# Patient Record
Sex: FEMALE | Race: WHITE | NOT HISPANIC OR LATINO | Employment: OTHER | URBAN - METROPOLITAN AREA
[De-identification: names, ages, dates, MRNs, and addresses within clinical notes are randomized per-mention and may not be internally consistent; named-entity substitution may affect disease eponyms.]

---

## 2017-01-03 ENCOUNTER — LAB VISIT (OUTPATIENT)
Dept: LAB | Facility: HOSPITAL | Age: 57
End: 2017-01-03
Attending: INTERNAL MEDICINE
Payer: COMMERCIAL

## 2017-01-03 ENCOUNTER — OFFICE VISIT (OUTPATIENT)
Dept: INTERNAL MEDICINE | Facility: CLINIC | Age: 57
End: 2017-01-03
Payer: COMMERCIAL

## 2017-01-03 VITALS
HEIGHT: 66 IN | BODY MASS INDEX: 29.76 KG/M2 | OXYGEN SATURATION: 97 % | HEART RATE: 68 BPM | WEIGHT: 185.19 LBS | DIASTOLIC BLOOD PRESSURE: 76 MMHG | SYSTOLIC BLOOD PRESSURE: 124 MMHG

## 2017-01-03 DIAGNOSIS — Z13.6 SCREENING FOR CARDIOVASCULAR CONDITION: ICD-10-CM

## 2017-01-03 DIAGNOSIS — Z23 NEED FOR TDAP VACCINATION: ICD-10-CM

## 2017-01-03 DIAGNOSIS — G47.00 INSOMNIA, UNSPECIFIED TYPE: Primary | ICD-10-CM

## 2017-01-03 DIAGNOSIS — R73.09 ELEVATED RANDOM BLOOD GLUCOSE LEVEL: ICD-10-CM

## 2017-01-03 DIAGNOSIS — Z12.11 COLON CANCER SCREENING: ICD-10-CM

## 2017-01-03 LAB
ALBUMIN SERPL BCP-MCNC: 3.9 G/DL
ALP SERPL-CCNC: 92 U/L
ALT SERPL W/O P-5'-P-CCNC: 13 U/L
ANION GAP SERPL CALC-SCNC: 8 MMOL/L
AST SERPL-CCNC: 15 U/L
BILIRUB SERPL-MCNC: 0.3 MG/DL
BUN SERPL-MCNC: 13 MG/DL
CALCIUM SERPL-MCNC: 9.4 MG/DL
CHLORIDE SERPL-SCNC: 107 MMOL/L
CO2 SERPL-SCNC: 26 MMOL/L
CREAT SERPL-MCNC: 0.9 MG/DL
EST. GFR  (AFRICAN AMERICAN): >60 ML/MIN/1.73 M^2
EST. GFR  (NON AFRICAN AMERICAN): >60 ML/MIN/1.73 M^2
GLUCOSE SERPL-MCNC: 82 MG/DL
HDLC SERPL-MCNC: 190 MG/DL
HDLC SERPL-MCNC: 45 MG/DL
POTASSIUM SERPL-SCNC: 4.3 MMOL/L
PROT SERPL-MCNC: 7.2 G/DL
SODIUM SERPL-SCNC: 141 MMOL/L
T4 FREE SERPL-MCNC: 1.11 NG/DL
TSH SERPL DL<=0.005 MIU/L-ACNC: 1.26 UIU/ML

## 2017-01-03 PROCEDURE — 1159F MED LIST DOCD IN RCRD: CPT | Mod: S$GLB,,, | Performed by: INTERNAL MEDICINE

## 2017-01-03 PROCEDURE — 90715 TDAP VACCINE 7 YRS/> IM: CPT | Mod: S$GLB,,, | Performed by: INTERNAL MEDICINE

## 2017-01-03 PROCEDURE — 84439 ASSAY OF FREE THYROXINE: CPT

## 2017-01-03 PROCEDURE — 99999 PR PBB SHADOW E&M-EST. PATIENT-LVL III: CPT | Mod: PBBFAC,,, | Performed by: INTERNAL MEDICINE

## 2017-01-03 PROCEDURE — 99214 OFFICE O/P EST MOD 30 MIN: CPT | Mod: 25,S$GLB,, | Performed by: INTERNAL MEDICINE

## 2017-01-03 PROCEDURE — 36415 COLL VENOUS BLD VENIPUNCTURE: CPT | Mod: PO

## 2017-01-03 PROCEDURE — 83718 ASSAY OF LIPOPROTEIN: CPT

## 2017-01-03 PROCEDURE — 84443 ASSAY THYROID STIM HORMONE: CPT

## 2017-01-03 PROCEDURE — 80053 COMPREHEN METABOLIC PANEL: CPT

## 2017-01-03 PROCEDURE — 90471 IMMUNIZATION ADMIN: CPT | Mod: S$GLB,,, | Performed by: INTERNAL MEDICINE

## 2017-01-03 PROCEDURE — 82465 ASSAY BLD/SERUM CHOLESTEROL: CPT

## 2017-01-03 NOTE — PROGRESS NOTES
Portions of this note are generated with voice recognition software. Typographical errors may exist.     SUBJECTIVE:    This is a/an 56 y.o. female here for primary care visit for  Chief Complaint   Patient presents with    Insomnia     Insomnia.  Patient states she averages between 5 and 7 hours of sleep nightly.  States that significant psychosocial stressors have occurred related to divorce summer 2016.  This is been a huge adjustment for the patient.  States that sleeping has improved as she has been able to adjust to this change in her life.  Patient states that she is actively involved in psychotherapy with outside psychotherapist.  States she is not on pharmacotherapy.  She denies severe depression symptoms or suicidality.  She is not using a sleep aid.  Denies history of thyroidal illness.  Denies family history of thyroidal illness.    Episodic diarrhea.  Patient states that this is not active presently but has episodes that have no specific precipitating factor that she is aware of.  Patient has a gallbladder.  She feels that she might be lactose intolerant but has not linked her symptoms to lactose.  Patient also notes that when she becomes stressed she has change in bowel habits including diarrhea.  She does not a formal diagnosis of irritable bowel syndrome.    Colon cancer screening.  Patient states negative family history of colon cancer.  Negative personal history of colonoscopy.  Patient also has not had fecal occult testing.  Patient has been reluctant to complete colonoscopy due to anxiety regarding procedure.  Patient denies melanotic stool or hematochezia.  Denies problems with chronic constipation.    Knee osteoarthritis.  Patient states that she has a history of meniscal tear in her right knee.  States that she is not having significant symptoms related to this old issue.  She is able to complete activities of daily living including to recreational activity.    Left lower quadrant abdominal  pain.  Patient states that she was evaluated for possible nephrolithiasis 2 years ago because of this issue.  States that she has not had recurring episode of this pain.  Denies typical symptoms of nephrolithiasis.    Medications Reviewed and Updated    Past medical, family, and social histories were reviewed and updated.    Review of Systems negative unless otherwise noted in history of present illness-   General ROS: negative  Psychological ROS: negative  ENT ROS: negative  Allergy and Immunology ROS: negative  Cardiovascular ROS: negative  Gastrointestinal ROS: negative  Genito-Urinary ROS: negative  Musculoskeletal ROS: negative  Neurological ROS: negative  Dermatological ROS: negative      Allergic:  Review of patient's allergies indicates:  No Known Allergies    OBJECTIVE:  BP: 124/76 Pulse: 68    Wt Readings from Last 3 Encounters:   01/03/17 84 kg (185 lb 3 oz)   11/17/15 88.5 kg (195 lb 1.6 oz)   11/11/14 89.4 kg (197 lb)    Body mass index is 29.89 kg/(m^2).  Previous Blood Pressure Readings :   BP Readings from Last 3 Encounters:   01/03/17 124/76   11/17/15 114/80   11/11/14 110/80       GEN: No apparent distress  HEENT: sclera non-icteric, conjunctiva clear no Lymphadenopathy or thyromegaly  CV: no peripheral edema regular rate and rhythm no murmurs  PULM: breathing non-labored no wheezing bilateral lung fields  ABD: Obese, protuberant abdomen.  No organomegaly  PSYCH: appropriate affect  MSK: able to rise from chair without assistance  SKIN: normal skin turgor    Pertinent Labs Reviewed       ASSESSMENT/PLAN:    Chronic Insomnia.  Not optimally controlled.  Etiology likely multifactorial.  Adjustment reaction.  Poor sleep hygiene.  Counseling on self-care measures.  Check TFTs.  Continue psychotherapy. patient advised if symptoms change or intensify to seek care in the nearest [facility] health center.     Adjustment reaction.  Clinical follow-up warranted.  Recommend patient to follow psychotherapist.  patient advised if symptoms change or intensify to seek care in the nearest [facility]     Episodic diarrhea.  Clinical follow-up warranted.  Counseling on use of symptom diary.  Restrict lactose for now and see if symptoms resolve.    Knee osteoarthritis.  Stable at this time.  Counseling on self-care measures.  Plan to continue monitoring clinically    Left lower quadrant abdominal pain.  Stable at this time.  Etiology unclear.  Could've represented IBS cramps or episode of diverticulitis.  Plan to continue monitoring clinically.    Healthcare maintenance.  Colon cancer screening today.    Future Appointments  Date Time Provider Department Center   7/3/2017 9:00 AM Silvio Mckeon MD [facility]       Silvio Mckeon  1/3/2017  1:51 PM

## 2017-01-03 NOTE — MR AVS SNAPSHOT
[facility]   [place]  [place] [address]  Phone: [phone]  Fax: [phone]                  Mari Ambrocio   1/3/2017 1:20 PM   Office Visit    Description:  Female : 1960   Provider:  Silvio Mckeon MD   Department:  On license of [facility]           Reason for Visit     Insomnia           Diagnoses this Visit        Comments    Insomnia, unspecified type    -  Primary     Need for Tdap vaccination         Screening for cardiovascular condition         Elevated random blood glucose level         Colon cancer screening                To Do List           Future Appointments        Provider Department Dept Phone    1/3/2017 2:15 PM [facility] [phone]    7/3/2017 9:00 AM Silvio Mckeon MD On license of [facility] [phone]      Goals (5 Years of Data)     None      [facility]     [facility] -  Assistance  Registered nurses in the [facility] provide clinical advisement, health education, appointment booking, and other advisory services.  Call for this free service at [phone].             Medications           Message regarding Medications     Verify the changes and/or additions to your medication regime listed below are the same as discussed with your clinician today.  If any of these changes or additions are incorrect, please notify your healthcare provider.             Verify that the below list of medications is an accurate representation of the medications you are currently taking.  If none reported, the list may be blank. If incorrect, please contact your healthcare provider. Carry this list with you in case of emergency.                Clinical Reference Information           Vital Signs - Last Recorded  Most recent update: 1/3/2017  1:21 PM by Nathalie López MA    BP Pulse Ht Wt SpO2 BMI    124/76 (BP Location: Right arm, Patient Position:  "Sitting, BP Method: Manual) 68 5' 6" (1.676 m) 84 kg (185 lb 3 oz) 97% 29.89 kg/m2      Blood Pressure          Most Recent Value    BP  124/76      Allergies as of 1/3/2017     No Known Allergies      Immunizations Administered on Date of Encounter - 1/3/2017     Name Date Dose VIS Date Route    TDAP  Incomplete 0.5 mL 2/24/2015 Intramuscular      Orders Placed During Today's Visit      Normal Orders This Visit    Tdap Vaccine (Adult)     Future Labs/Procedures Expected by Expires    Cholesterol, total  1/3/2017 3/4/2018    Comprehensive metabolic panel  1/3/2017 4/3/2017    HDL CHOLESTEROL  1/3/2017 3/4/2018    Occult Blood Stool, CA Screen  1/3/2017 1/3/2018    Occult Blood Stool, CA Screen  1/3/2017 1/3/2018    Occult Blood Stool, CA Screen  1/3/2017 1/3/2018    T4, free  1/3/2017 4/3/2017    TSH  1/3/2017 3/4/2018      "

## 2017-01-04 ENCOUNTER — LAB VISIT (OUTPATIENT)
Dept: LAB | Facility: HOSPITAL | Age: 57
End: 2017-01-04
Attending: INTERNAL MEDICINE
Payer: COMMERCIAL

## 2017-01-04 DIAGNOSIS — Z12.11 COLON CANCER SCREENING: ICD-10-CM

## 2017-01-04 LAB
OB PNL STL: NEGATIVE

## 2017-01-04 PROCEDURE — 82270 OCCULT BLOOD FECES: CPT | Mod: 91

## 2017-01-05 ENCOUNTER — PATIENT MESSAGE (OUTPATIENT)
Dept: INTERNAL MEDICINE | Facility: CLINIC | Age: 57
End: 2017-01-05

## 2017-01-08 ENCOUNTER — TELEPHONE (OUTPATIENT)
Dept: INTERNAL MEDICINE | Facility: CLINIC | Age: 57
End: 2017-01-08

## 2017-01-08 DIAGNOSIS — Z12.31 ENCOUNTER FOR SCREENING MAMMOGRAM FOR BREAST CANCER: Primary | ICD-10-CM

## 2017-01-08 NOTE — TELEPHONE ENCOUNTER
----- Message from Nathalie López MA sent at 1/5/2017  9:44 AM CST -----  Need orders to schedule  ----- Message -----     From: Smith Stevens     Sent: 1/4/2017   6:23 PM       To: Mike CALL Staff    Appointment Request From: Mari Ambrocio         With Provider: Other - (see comments) [oth]        Would Accept With:Request to schedule a test or procedure        Preferred Date Range: Any date 1/4/2017 or later        Preferred Times: Any        Reason for visit: Request an Appt        Comments:    I need a mammogram scheduled

## 2017-01-09 ENCOUNTER — TELEPHONE (OUTPATIENT)
Dept: FAMILY MEDICINE | Facility: CLINIC | Age: 57
End: 2017-01-09

## 2017-01-09 NOTE — TELEPHONE ENCOUNTER
----- Message from Silvio Mckeon MD sent at 1/8/2017  5:26 PM CST -----  Mammogram orders completed.  Contact patient to schedule mammogram.

## 2017-01-14 ENCOUNTER — HOSPITAL ENCOUNTER (OUTPATIENT)
Dept: RADIOLOGY | Facility: HOSPITAL | Age: 57
Discharge: HOME | End: 2017-01-14
Attending: INTERNAL MEDICINE
Payer: COMMERCIAL

## 2017-01-14 DIAGNOSIS — Z12.31 ENCOUNTER FOR SCREENING MAMMOGRAM FOR BREAST CANCER: ICD-10-CM

## 2017-01-14 PROCEDURE — 77067 SCR MAMMO BI INCL CAD: CPT | Mod: 26,,, | Performed by: RADIOLOGY

## 2017-01-14 PROCEDURE — 77067 SCR MAMMO BI INCL CAD: CPT | Mod: TC

## 2017-01-14 PROCEDURE — 77063 BREAST TOMOSYNTHESIS BI: CPT | Mod: 26,,, | Performed by: RADIOLOGY

## 2017-08-04 DIAGNOSIS — R19.00 PELVIC FULLNESS: Primary | ICD-10-CM

## 2017-08-22 ENCOUNTER — OFFICE VISIT (OUTPATIENT)
Dept: OBSTETRICS AND GYNECOLOGY | Facility: CLINIC | Age: 57
End: 2017-08-22
Payer: COMMERCIAL

## 2017-08-22 VITALS
WEIGHT: 164.69 LBS | DIASTOLIC BLOOD PRESSURE: 86 MMHG | SYSTOLIC BLOOD PRESSURE: 128 MMHG | BODY MASS INDEX: 26.58 KG/M2

## 2017-08-22 DIAGNOSIS — Z12.4 SCREENING FOR CERVICAL CANCER: ICD-10-CM

## 2017-08-22 DIAGNOSIS — N81.11 MIDLINE CYSTOCELE: ICD-10-CM

## 2017-08-22 DIAGNOSIS — Z01.419 WELL WOMAN EXAM WITH ROUTINE GYNECOLOGICAL EXAM: Primary | ICD-10-CM

## 2017-08-22 PROCEDURE — 87624 HPV HI-RISK TYP POOLED RSLT: CPT

## 2017-08-22 PROCEDURE — 99999 PR PBB SHADOW E&M-EST. PATIENT-LVL III: CPT | Mod: PBBFAC,,, | Performed by: OBSTETRICS & GYNECOLOGY

## 2017-08-22 PROCEDURE — 88175 CYTOPATH C/V AUTO FLUID REDO: CPT

## 2017-08-22 PROCEDURE — 99386 PREV VISIT NEW AGE 40-64: CPT | Mod: S$GLB,,, | Performed by: OBSTETRICS & GYNECOLOGY

## 2017-08-22 NOTE — PATIENT INSTRUCTIONS
Understanding Cystocele (Prolapsed Bladder)  A cystocele is when a womans bladder sags down into the vagina. It does this when the wall of tissue between the bladder and the vagina gets weak. Its also called a prolapsed bladder. The sagging bladder can stretch the opening of the urethra. This is the tube that carries urine out of the body. This can cause urine to leak when you cough, sneeze, or lift something heavy. A cystocele can also cause discomfort in the pelvis and make it hard to fully empty your bladder.  Causes of a cystocele  A cystocele may be caused by:  · Heavy lifting  · Straining muscles during childbirth  · Repeated straining during bowel movements  · Weakened muscles around the vagina caused by lack of estrogen after menopause  Symptoms of a cystocele  Symptoms of a cystocele include:  · Leakage of urine when you cough, sneeze, or lift something heavy  · Heavy, achy, or full feeling in the pelvis  · Pelvic pressure that gets worse with standing, lifting, or coughing  · A bulge in the vagina that you can feel  · Lower back pain  · Sexual difficulties  · Problems with inserting tampons  Diagnosis of a cystocele  Your healthcare provider will ask about your medical history and give you a physical exam. You may also have a cystourethrogram. This is also called a voiding cystogram. This is an X-ray taken of the bladder while you urinate. Youll be given a special dye called a contrast medium. This helps show the bladder and urethra on the X-ray. This lets your healthcare provider can see the shape of your bladder, and look for problems. You may need other tests to see if there are any problems in the other areas of your urinary system.  A cystocele is graded during diagnosis. Grade 1 means the bladder sags only a short way into the top of the vagina. Grade 2 means the bladder sags down to the lower opening of the vagina. Grade 3 means the bladder sags out of the lower opening of the  vagina.  Treatment of a cystocele  Treatment depends on the grade of your cystocele and other factors. Your choices may include:  · Change of activity. You may need to avoid certain activities, such as heavy lifting or straining, that can cause your cystocele to get worse.  · Pessary. This is a device put in the vagina to hold the bladder in place.  · Surgery. A procedure can be done to move the bladder back into a more normal position and hold it in place.  · Estrogen replacement therapy. This may help to strengthen the muscles around the vagina and bladder. Talk with your healthcare provider about the risks and benefits of hormone therapy based on your medical history.  Date Last Reviewed: 8/29/2015  © 1548-5764 [employer]. [address] [place] [address], [place] [address]. All rights reserved. This information is not intended as a substitute for professional medical care. Always follow your healthcare professional's instructions.

## 2017-08-22 NOTE — PROGRESS NOTES
GYNECOLOGY OFFICE NOTE    Reason for visit: annual    HPI: Pt is a 57 y.o.  female   who presents for annual and  evaluation of bulge in the vagina and pelvic pressure one episode and associated back pain. First noticed in May after starting new sexual relationship (previously no sex x 10yrs). No longer feels symptoms. Denies pelvic pain, issues with urination or bowel movements. Denies vaginal bleeding or discharge.  since age 39. She does not desire STI screening.  Last pap: 2014, denies hx of abnormal. Last MMG 2017- negative. ( x 3- all 9lbs)    Past Medical History:   Diagnosis Date    Ganglion cyst     History of shingles     Hyperlipidemia     Kidney stone     Otitis media     Sciatica     Urinary tract infection        Past Surgical History:   Procedure Laterality Date    GANGLION CYST EXCISION      urethra widened         Family History   Problem Relation Age of Onset    Cancer Maternal Grandmother      breast    Kidney disease Neg Hx        Social History   Substance Use Topics    Smoking status: Former Smoker     Packs/day: 2.00     Types: Cigarettes     Quit date: 10/24/2008    Smokeless tobacco: Never Used    Alcohol use No       OB History    Para Term  AB Living   3         3   SAB TAB Ectopic Multiple Live Births                  # Outcome Date GA Lbr Abhishek/2nd Weight Sex Delivery Anes PTL Lv   3      F Vag-Spont      2      F Vag-Spont      1      F Vag-Spont             No current outpatient prescriptions on file.     No current facility-administered medications for this visit.        Allergies: Bananas [banana]; Cantaloupe; Cucumbers [cucumber fruit extract]; [place]; and Watermelon     /86   Wt 74.7 kg (164 lb 10.9 oz)   BMI 26.58 kg/m²     ROS:  GENERAL: Denies fever or chills.   SKIN: Denies rash or lesions.   HEAD: Denies head injury or headache.   CHEST: Denies chest pain or shortness of breath.   CARDIOVASCULAR:  Denies palpitations or chest pain.   ABDOMEN: No abdominal pain, constipation, diarrhea, nausea, vomiting or rectal bleeding.   URINARY: No dysuria, hematuria, or burning on urination.  REPRODUCTIVE: See HPI.   BREASTS: Denies pain, lumps, or nipple discharge.   NEUROLOGIC: Denies syncope or weakness.     Physical Exam:  GENERAL: alert, appears stated age and cooperative  CHEST: Normal respiratory effort  HEART: S1 and S2 normal, regular rate and rhythm  NECK: normal appearance, no thyromegaly masses or tenderness  SKIN: no acne, striae, hirsutism  BREAST EXAM: breasts appear normal, no suspicious masses, no skin or nipple changes or axillary nodes  ABDOMEN: abdomen is soft without significant tenderness, masses, organomegaly or guarding, no hernias noted  EXTERNAL GENITALIA:  normal general appearance  URETHRA: normal urethra, normal urethral meatus  VAGINA:  normal without tenderness, induration or masses and cystocele present, grade 2, TVL ~7cm. small grade 1 rectocele.  CERVIX:  Normal  UTERUS:  normal size, mobile, non-tender  ADNEXA:  normal adnexa in size, nontender and no masses    Diagnosis:  1. Well woman exam with routine gynecological exam    2. Screening for cervical cancer    3. Midline cystocele        Plan:   1. Annual  2. Pap/hpv today  3. Discussed treatment options- kegels, pessary, surgery    Orders Placed This Encounter    HPV High Risk Genotypes, PCR    Liquid-based pap smear, screening       Patient was counseled today on the new ACS guidelines for cervical cytology screening as well as the current recommendations for breast cancer screening. She was counseled to follow up with her PCP for other routine health maintenance.     Return in about 1 year (around 8/22/2018), or if symptoms worsen or fail to improve, for annual.      Monique Baker MD  OB/GYN  Pager: [phone]

## 2017-08-28 LAB
HPV HR 12 DNA CVX QL NAA+PROBE: NEGATIVE
HPV16 DNA SPEC QL NAA+PROBE: NEGATIVE
HPV18 DNA SPEC QL NAA+PROBE: NEGATIVE

## 2017-11-14 ENCOUNTER — TELEPHONE (OUTPATIENT)
Dept: OBSTETRICS AND GYNECOLOGY | Facility: CLINIC | Age: 57
End: 2017-11-14

## 2017-11-14 NOTE — TELEPHONE ENCOUNTER
Returned patients call. Informed her of Dr. Riley and that he is a urogyn. Patient verbalized understanding.

## 2017-11-14 NOTE — TELEPHONE ENCOUNTER
----- Message from Cindy Cantu sent at 11/14/2017 12:01 PM CST -----  Contact: [phone]  Patient would like to get the names of the dr, that patient was referred to

## 2017-11-14 NOTE — TELEPHONE ENCOUNTER
----- Message from Jaimie Velazquez sent at 11/14/2017  1:32 PM CST -----  Contact: self/[phone]  Patient is returning your call.  Please advise

## 2017-11-14 NOTE — TELEPHONE ENCOUNTER
Patient states she would like to know the name of the 2 doctors referred to her from her appointment in August regarding her cystocele. Please advise.

## 2018-01-08 DIAGNOSIS — Z12.11 COLON CANCER SCREENING: ICD-10-CM

## 2018-01-31 ENCOUNTER — OFFICE VISIT (OUTPATIENT)
Dept: INTERNAL MEDICINE | Facility: CLINIC | Age: 58
End: 2018-01-31
Payer: COMMERCIAL

## 2018-01-31 VITALS
HEIGHT: 64 IN | BODY MASS INDEX: 27.36 KG/M2 | SYSTOLIC BLOOD PRESSURE: 120 MMHG | HEART RATE: 75 BPM | WEIGHT: 160.25 LBS | DIASTOLIC BLOOD PRESSURE: 80 MMHG

## 2018-01-31 DIAGNOSIS — E78.1 HYPERTRIGLYCERIDEMIA: ICD-10-CM

## 2018-01-31 DIAGNOSIS — L85.3 DRY SKIN: ICD-10-CM

## 2018-01-31 DIAGNOSIS — Z12.31 ENCOUNTER FOR SCREENING MAMMOGRAM FOR BREAST CANCER: ICD-10-CM

## 2018-01-31 DIAGNOSIS — Z00.00 WELLNESS EXAMINATION: Primary | ICD-10-CM

## 2018-01-31 DIAGNOSIS — Z13.220 ENCOUNTER FOR LIPID SCREENING FOR CARDIOVASCULAR DISEASE: ICD-10-CM

## 2018-01-31 DIAGNOSIS — Z12.11 ENCOUNTER FOR FECAL IMMUNOCHEMICAL TEST SCREENING: ICD-10-CM

## 2018-01-31 DIAGNOSIS — Z13.6 ENCOUNTER FOR LIPID SCREENING FOR CARDIOVASCULAR DISEASE: ICD-10-CM

## 2018-01-31 PROBLEM — G47.00 INSOMNIA: Status: RESOLVED | Noted: 2017-01-03 | Resolved: 2018-01-31

## 2018-01-31 PROCEDURE — 99396 PREV VISIT EST AGE 40-64: CPT | Mod: S$GLB,,, | Performed by: INTERNAL MEDICINE

## 2018-01-31 PROCEDURE — 99999 PR PBB SHADOW E&M-EST. PATIENT-LVL III: CPT | Mod: PBBFAC,,, | Performed by: INTERNAL MEDICINE

## 2018-02-01 ENCOUNTER — LAB VISIT (OUTPATIENT)
Dept: LAB | Facility: HOSPITAL | Age: 58
End: 2018-02-01
Attending: INTERNAL MEDICINE
Payer: COMMERCIAL

## 2018-02-01 DIAGNOSIS — L85.3 DRY SKIN: ICD-10-CM

## 2018-02-01 DIAGNOSIS — Z13.220 ENCOUNTER FOR LIPID SCREENING FOR CARDIOVASCULAR DISEASE: ICD-10-CM

## 2018-02-01 DIAGNOSIS — Z13.6 ENCOUNTER FOR LIPID SCREENING FOR CARDIOVASCULAR DISEASE: ICD-10-CM

## 2018-02-01 DIAGNOSIS — E78.1 HYPERTRIGLYCERIDEMIA: ICD-10-CM

## 2018-02-01 LAB
ALBUMIN SERPL BCP-MCNC: 3.8 G/DL
ALP SERPL-CCNC: 93 U/L
ALT SERPL W/O P-5'-P-CCNC: 19 U/L
ANION GAP SERPL CALC-SCNC: 10 MMOL/L
AST SERPL-CCNC: 19 U/L
BILIRUB SERPL-MCNC: 0.4 MG/DL
BUN SERPL-MCNC: 11 MG/DL
CALCIUM SERPL-MCNC: 9.8 MG/DL
CHLORIDE SERPL-SCNC: 107 MMOL/L
CHOLEST SERPL-MCNC: 204 MG/DL
CHOLEST/HDLC SERPL: 3.3 {RATIO}
CO2 SERPL-SCNC: 25 MMOL/L
CREAT SERPL-MCNC: 0.8 MG/DL
EST. GFR  (AFRICAN AMERICAN): >60 ML/MIN/1.73 M^2
EST. GFR  (NON AFRICAN AMERICAN): >60 ML/MIN/1.73 M^2
GLUCOSE SERPL-MCNC: 99 MG/DL
HDLC SERPL-MCNC: 61 MG/DL
HDLC SERPL: 29.9 %
LDLC SERPL CALC-MCNC: 128.2 MG/DL
NONHDLC SERPL-MCNC: 143 MG/DL
POTASSIUM SERPL-SCNC: 4.5 MMOL/L
PROT SERPL-MCNC: 7.3 G/DL
SODIUM SERPL-SCNC: 142 MMOL/L
TRIGL SERPL-MCNC: 74 MG/DL
TSH SERPL DL<=0.005 MIU/L-ACNC: 1.47 UIU/ML

## 2018-02-01 PROCEDURE — 80061 LIPID PANEL: CPT

## 2018-02-01 PROCEDURE — 80053 COMPREHEN METABOLIC PANEL: CPT

## 2018-02-01 PROCEDURE — 84443 ASSAY THYROID STIM HORMONE: CPT

## 2018-02-01 PROCEDURE — 36415 COLL VENOUS BLD VENIPUNCTURE: CPT | Mod: PO

## 2018-02-02 NOTE — PROGRESS NOTES
Portions of this note are generated with voice recognition software. Typographical errors may exist.     SUBJECTIVE:    This is a/an 57 y.o. female here for primary care visit for  Chief Complaint   Patient presents with    Annual Exam     Patient states that since completing her divorce anxiousness has improved significantly.  She still interacts with her ex- in the workplace which can be stressful occasionally but otherwise she is doing well.  She has supportive family.  Supportive friends.    Patient states that she is trying to stay physically active to prevent needing pharmacotherapy for high blood pressure or hyperlipidemia.    She denies any orthopedic complaints.    She does have some problems with dry skin but this is associated with recent significant sun exposure from recent vacation resulting in sunburn.    Patient would like to pursue stool testing for colon cancer screening and wants to schedule mammogram.      Medications Reviewed and Updated    Past medical, family, and social histories were reviewed and updated.    Review of Systems negative unless otherwise noted in history of present illness-  ROS    General ROS: negative  Psychological ROS: negative  ENT ROS: negative  Endocrine ROS: Negative  Allergy and Immunology ROS: negative  Cardiovascular ROS: negative  Pulmonary ROS: Negative  Gastrointestinal ROS: negative  Genito-Urinary ROS: negative  Musculoskeletal ROS: negative  Neurological ROS: negative  Dermatological ROS: negative        Allergic:    Review of patient's allergies indicates:   Allergen Reactions    Bananas [banana]     Cantaloupe     Cucumbers [cucumber fruit extract]     [place]     Watermelon        OBJECTIVE:  BP: 120/80 Pulse: 75    Wt Readings from Last 3 Encounters:   01/31/18 72.7 kg (160 lb 4.4 oz)   08/22/17 74.7 kg (164 lb 10.9 oz)   01/03/17 84 kg (185 lb 3 oz)    Body mass index is 27.51 kg/m².  Previous Blood Pressure Readings :   BP Readings from Last 3  Encounters:   01/31/18 120/80   08/22/17 128/86   01/03/17 124/76       Physical Exam    GEN: No apparent distress  HEENT: sclera non-icteric, conjunctiva clear  CV: no peripheral edema  PULM: breathing non-labored  ABD:  protuberant abdomen.   PSYCH: appropriate affect  MSK: able to rise from chair without assistance  SKIN: normal skin turgor    Pertinent Labs Reviewed       ASSESSMENT/PLAN:    Wellness examination    Dry skin  -     TSH; Future; Expected date: 01/31/2018    Hypertriglyceridemia  -     Comprehensive metabolic panel; Future; Expected date: 01/31/2018    Encounter for fecal immunochemical test screening  -     Fecal Immunochemical Test (iFOBT); Future; Expected date: 01/31/2018    Encounter for screening mammogram for breast cancer  -     Mammo Digital Screening Bilateral With CAD; Future; Expected date: 01/31/2018    Encounter for lipid screening for cardiovascular disease  -     Lipid panel; Future; Expected date: 01/31/2018    Other orders  -     Cancel: Lipase; Future; Expected date: 01/31/2018  -     Cancel: Cholesterol, total; Future; Expected date: 01/31/2018  -     Cancel: HDL CHOLESTEROL; Future; Expected date: 01/31/2018  -     Cancel: TSH; Future; Expected date: 01/31/2018          Future Appointments  Date Time Provider Department Center   2/3/2018 10:45 AM [facility] [id number] [facility] MAMMO [place]  2/2/2018  2:25 PM

## 2018-02-03 ENCOUNTER — HOSPITAL ENCOUNTER (OUTPATIENT)
Dept: RADIOLOGY | Facility: HOSPITAL | Age: 58
Discharge: HOME | End: 2018-02-03
Attending: INTERNAL MEDICINE
Payer: COMMERCIAL

## 2018-02-03 VITALS — HEIGHT: 64 IN | WEIGHT: 160 LBS | BODY MASS INDEX: 27.31 KG/M2

## 2018-02-03 DIAGNOSIS — Z12.31 ENCOUNTER FOR SCREENING MAMMOGRAM FOR BREAST CANCER: ICD-10-CM

## 2018-02-03 PROCEDURE — 77067 SCR MAMMO BI INCL CAD: CPT | Mod: TC

## 2018-02-03 PROCEDURE — 77063 BREAST TOMOSYNTHESIS BI: CPT | Mod: 26,,, | Performed by: RADIOLOGY

## 2018-02-03 PROCEDURE — 77067 SCR MAMMO BI INCL CAD: CPT | Mod: 26,,, | Performed by: RADIOLOGY

## 2018-02-15 ENCOUNTER — PATIENT MESSAGE (OUTPATIENT)
Dept: INTERNAL MEDICINE | Facility: CLINIC | Age: 58
End: 2018-02-15

## 2018-02-15 ENCOUNTER — LAB VISIT (OUTPATIENT)
Dept: LAB | Facility: HOSPITAL | Age: 58
End: 2018-02-15
Attending: INTERNAL MEDICINE
Payer: COMMERCIAL

## 2018-02-15 DIAGNOSIS — Z12.11 ENCOUNTER FOR COLORECTAL CANCER SCREENING: Primary | ICD-10-CM

## 2018-02-15 DIAGNOSIS — Z12.12 ENCOUNTER FOR COLORECTAL CANCER SCREENING: Primary | ICD-10-CM

## 2018-02-15 DIAGNOSIS — Z12.11 ENCOUNTER FOR FECAL IMMUNOCHEMICAL TEST SCREENING: ICD-10-CM

## 2018-02-15 LAB — HEMOCCULT STL QL IA: POSITIVE

## 2018-02-15 PROCEDURE — 82274 ASSAY TEST FOR BLOOD FECAL: CPT

## 2018-02-22 DIAGNOSIS — Z12.11 COLON CANCER SCREENING: Primary | ICD-10-CM

## 2018-02-22 NOTE — TELEPHONE ENCOUNTER
Colonoscopy Referral  Referring Physician: Dr. Landers  Date:   Reason for Referral: Screening  Family History of:   Colon polyp: No  Relationship/Age of Onset:   Colon cancer: No  Relationship/Age of Onset:   Patient with:   Hemoccults Done: yes  Iron deficient: No  On Blood Thinner: No  Valvular heart disease/valve replacement: No  Anemia Present: No  On NSAID: No  Lung disease: No  Kidney disease: No  Hx of polyps: No  Hx of colon cancer: No  Previous colon evalations: No   None  When:   Where:   Pertinent symptoms:   Current Outpatient Prescriptions:  loperamide (IMODIUM) 1 mg/5 mL solution, Take by mouth every 6 (six) hours as needed for Diarrhea., Disp: , Rfl:   No current facility-administered medications for this visit.   ?  Review of patient's allergies indicates:  No Known Allergies  Patient was scheduled for colonoscopy on 2/21/18 with Dr. Harrington at [facility]. Gol[facility]  instructions were reviewed with patient.

## 2018-02-27 ENCOUNTER — TELEPHONE (OUTPATIENT)
Dept: GASTROENTEROLOGY | Facility: CLINIC | Age: 58
End: 2018-02-27

## 2018-02-27 ENCOUNTER — PATIENT MESSAGE (OUTPATIENT)
Dept: INTERNAL MEDICINE | Facility: CLINIC | Age: 58
End: 2018-02-27

## 2018-02-27 RX ORDER — POLYETHYLENE GLYCOL 3350, SODIUM SULFATE ANHYDROUS, SODIUM BICARBONATE, SODIUM CHLORIDE, POTASSIUM CHLORIDE 236; 22.74; 6.74; 5.86; 2.97 G/4L; G/4L; G/4L; G/4L; G/4L
4 POWDER, FOR SOLUTION ORAL SEE ADMIN INSTRUCTIONS
Qty: 4000 ML | Refills: 0 | Status: ON HOLD | OUTPATIENT
Start: 2018-02-27 | End: 2018-04-11 | Stop reason: HOSPADM

## 2018-02-27 NOTE — TELEPHONE ENCOUNTER
Spoke with pt and she would like to reschedule her Colonoscopy to 3/28/18 with Dr. Harrington. Pt stated that she will  her prep today. I will also mail out an additional information sheet to her home address. Verbal understanding.

## 2018-03-06 ENCOUNTER — TELEPHONE (OUTPATIENT)
Dept: GASTROENTEROLOGY | Facility: CLINIC | Age: 58
End: 2018-03-06

## 2018-03-06 NOTE — TELEPHONE ENCOUNTER
Spoke with pt and she would like to reschedule her procedure to 4/11/18. Pt had some questions about her Vaginal area. I infomed pt that she should f/u with Dr. Baker her OBGYN MD. Verbal Understanding.

## 2018-03-07 ENCOUNTER — TELEPHONE (OUTPATIENT)
Dept: GASTROENTEROLOGY | Facility: CLINIC | Age: 58
End: 2018-03-07

## 2018-03-07 NOTE — TELEPHONE ENCOUNTER
Informed pt that 3/14/18 is not avaliable for any procedure. Patient will just keep 4/11/18 as a good date. Verbal Understanding.

## 2018-04-11 ENCOUNTER — ANESTHESIA EVENT (OUTPATIENT)
Dept: ENDOSCOPY | Facility: HOSPITAL | Age: 58
End: 2018-04-11
Payer: COMMERCIAL

## 2018-04-11 ENCOUNTER — ANESTHESIA (OUTPATIENT)
Dept: ENDOSCOPY | Facility: HOSPITAL | Age: 58
End: 2018-04-11
Payer: COMMERCIAL

## 2018-04-11 ENCOUNTER — HOSPITAL ENCOUNTER (OUTPATIENT)
Facility: HOSPITAL | Age: 58
Discharge: HOME | End: 2018-04-11
Attending: INTERNAL MEDICINE | Admitting: INTERNAL MEDICINE
Payer: COMMERCIAL

## 2018-04-11 DIAGNOSIS — Z12.11 SCREENING FOR COLON CANCER: ICD-10-CM

## 2018-04-11 DIAGNOSIS — Z12.11 SPECIAL SCREENING FOR MALIGNANT NEOPLASMS, COLON: Primary | ICD-10-CM

## 2018-04-11 PROCEDURE — 27201042 HC RETRIEVAL NET: Performed by: INTERNAL MEDICINE

## 2018-04-11 PROCEDURE — 37000009 HC ANESTHESIA EA ADD 15 MINS: Performed by: INTERNAL MEDICINE

## 2018-04-11 PROCEDURE — 63600175 PHARM REV CODE 636 W HCPCS: Performed by: NURSE ANESTHETIST, CERTIFIED REGISTERED

## 2018-04-11 PROCEDURE — 88305 TISSUE EXAM BY PATHOLOGIST: CPT | Mod: 26,,, | Performed by: PATHOLOGY

## 2018-04-11 PROCEDURE — 27201089 HC SNARE, DISP (ANY): Performed by: INTERNAL MEDICINE

## 2018-04-11 PROCEDURE — 45380 COLONOSCOPY AND BIOPSY: CPT | Mod: 59,,, | Performed by: INTERNAL MEDICINE

## 2018-04-11 PROCEDURE — 45390 COLONOSCOPY W/RESECTION: CPT | Performed by: INTERNAL MEDICINE

## 2018-04-11 PROCEDURE — 88305 TISSUE EXAM BY PATHOLOGIST: CPT | Performed by: PATHOLOGY

## 2018-04-11 PROCEDURE — 45385 COLONOSCOPY W/LESION REMOVAL: CPT | Mod: 33,,, | Performed by: INTERNAL MEDICINE

## 2018-04-11 PROCEDURE — 25000003 PHARM REV CODE 250: Performed by: INTERNAL MEDICINE

## 2018-04-11 PROCEDURE — 45380 COLONOSCOPY AND BIOPSY: CPT | Performed by: INTERNAL MEDICINE

## 2018-04-11 PROCEDURE — 45381 COLONOSCOPY SUBMUCOUS NJX: CPT | Mod: 51,,, | Performed by: INTERNAL MEDICINE

## 2018-04-11 PROCEDURE — 27201012 HC FORCEPS, HOT/COLD, DISP: Performed by: INTERNAL MEDICINE

## 2018-04-11 PROCEDURE — 37000008 HC ANESTHESIA 1ST 15 MINUTES: Performed by: INTERNAL MEDICINE

## 2018-04-11 PROCEDURE — 27201028 HC NEEDLE, SCLERO: Performed by: INTERNAL MEDICINE

## 2018-04-11 RX ORDER — SODIUM CHLORIDE 9 MG/ML
INJECTION, SOLUTION INTRAVENOUS CONTINUOUS
Status: DISCONTINUED | OUTPATIENT
Start: 2018-04-11 | End: 2018-04-11 | Stop reason: HOSPADM

## 2018-04-11 RX ORDER — PROPOFOL 10 MG/ML
VIAL (ML) INTRAVENOUS
Status: DISCONTINUED | OUTPATIENT
Start: 2018-04-11 | End: 2018-04-11

## 2018-04-11 RX ORDER — LIDOCAINE HCL/PF 100 MG/5ML
SYRINGE (ML) INTRAVENOUS
Status: DISCONTINUED | OUTPATIENT
Start: 2018-04-11 | End: 2018-04-11

## 2018-04-11 RX ORDER — PROPOFOL 10 MG/ML
VIAL (ML) INTRAVENOUS CONTINUOUS PRN
Status: DISCONTINUED | OUTPATIENT
Start: 2018-04-11 | End: 2018-04-11

## 2018-04-11 RX ADMIN — LIDOCAINE HYDROCHLORIDE 20 MG: 20 INJECTION, SOLUTION INTRAVENOUS at 10:04

## 2018-04-11 RX ADMIN — SODIUM CHLORIDE: 0.9 INJECTION, SOLUTION INTRAVENOUS at 08:04

## 2018-04-11 RX ADMIN — PROPOFOL 80 MG: 10 INJECTION, EMULSION INTRAVENOUS at 10:04

## 2018-04-11 RX ADMIN — PROPOFOL 150 MCG/KG/MIN: 10 INJECTION, EMULSION INTRAVENOUS at 10:04

## 2018-04-11 NOTE — H&P
History and Physical      Chief complaints: Requesting screening colonscopy    History of Presenting Illness    Patient requesting colonoscopy.  Patient denies any abdominal pain, weight loss or blood in the stool.  There is no family history of colon cancer.    Review of Systems   Constitutional: Negative for fever and appetite change.   HENT: Negative for sore throat, trouble swallowing and neck pain.   Eyes: Negative for photophobia and visual disturbance.   Respiratory: Negative for wheezing.   Cardiovascular: Negative for chest pain and palpitations.   Gastrointestinal: See HPI for details   Musculoskeletal: Negative for joint swelling and arthralgias.   Skin: Negative for rash and wound.   Neurological: Negative for dizziness, tremors and weakness.   Hematological: Negative.   Psychiatric/Behavioral: Negative for suicidal ideas and behavioral problems.     Past Medical History:   Diagnosis Date    Ganglion cyst     History of shingles     Hyperlipidemia     Kidney stone     Otitis media     Sciatica     Urinary tract infection        Past Surgical History:   Procedure Laterality Date    GANGLION CYST EXCISION      urethra widened         Family History   Problem Relation Age of Onset    Cancer Maternal Grandmother      breast    Breast cancer Maternal Grandmother     Kidney disease Neg Hx        Social History     Social History    Marital status:      Spouse name: N/A    Number of children: N/A    Years of education: N/A     Social History Main Topics    Smoking status: Former Smoker     Packs/day: 2.00     Types: Cigarettes     Quit date: 10/24/2008    Smokeless tobacco: Never Used    Alcohol use No    Drug use: No    Sexual activity: Yes     Partners: Male     Other Topics Concern    None     Social History Narrative    None       Current Facility-Administered Medications   Medication Dose Route Frequency Provider Last Rate Last Dose    0.9%  NaCl infusion   Intravenous  Continuous Deya Harrington MD 20 mL/hr at 04/11/18 0857         Review of patient's allergies indicates:   Allergen Reactions    Bananas [banana]     Cantaloupe     Cucumbers [cucumber fruit extract]     [place]     Watermelon        Objective:      Vitals:    04/11/18 0855   BP: 108/72   Pulse: 67   Resp: 16   Temp: 97.6 °F (36.4 °C)     Physical Exam   Constitutional: Patient is oriented to person, place, and time. Appears well-nourished.   HENT:   Mouth/Throat: Oropharynx is clear and moist.   Eyes: Pupils are equal, round, and reactive to light.   Neck: Neck supple.   Cardiovascular: Normal heart sounds.   Pulmonary/Chest: Effort normal and breath sounds normal.   Abdominal: Soft. Exhibits no mass. There is no tenderness. There is no guarding.   Musculoskeletal: Normal range of motion.   Lymphadenopathy: Has no cervical adenopathy.   Neurological:Alert and oriented to person, place, and time.   Skin: Skin is warm. No rash noted.   Psychiatric: Has a normal mood and affect.     Assessment:  Colon cancer screening    Plan:  Colonoscopy       I have reviewed the patient's medical history in detail and updated the computerized patient record

## 2018-04-11 NOTE — ANESTHESIA PREPROCEDURE EVALUATION
04/11/2018  Mari Ambrocio is a 57 y.o., female presents for colonoscopy under MAC. Former smoker    Anesthesia Evaluation    I have reviewed the Patient Summary Reports.    I have reviewed the Nursing Notes.   I have reviewed the Medications.     Review of Systems  Anesthesia Hx:  No problems with previous Anesthesia    Social:  Former Smoker    Hematology/Oncology:  Hematology Normal        Pulmonary:  Pulmonary Normal    Renal/:   Chronic Renal Disease    Musculoskeletal:  Musculoskeletal Normal    Neurological:   Neuromuscular Disease,    Endocrine:  Endocrine Normal    Dermatological:  Skin Normal        Physical Exam  General:  Well nourished    Airway/Jaw/Neck:  Airway Findings: Mouth Opening: Normal Tongue: Normal  Mallampati: II  TM Distance: Normal, at least 6 cm       Chest/Lungs:  Chest/Lungs Findings: Clear to auscultation, Normal Respiratory Rate     Heart/Vascular:  Heart Findings: Rate: Normal  Rhythm: Regular Rhythm  Sounds: Normal        Mental Status:  Mental Status Findings:  Cooperative, Alert and Oriented         Anesthesia Plan  Type of Anesthesia, risks & benefits discussed:  Anesthesia Type:  MAC  Patient's Preference:   Intra-op Monitoring Plan:   Intra-op Monitoring Plan Comments:   Post Op Pain Control Plan: multimodal analgesia  Post Op Pain Control Plan Comments:   Induction:    Beta Blocker:         Informed Consent: Patient understands risks and agrees with Anesthesia plan.  Questions answered. Anesthesia consent signed with patient.  ASA Score: 2     Day of Surgery Review of History & Physical:  There are no significant changes.          Ready For Surgery From Anesthesia Perspective.

## 2018-04-11 NOTE — OR NURSING
Discharge Instructions given to patient. Verbalized understanding. Alert and oriented x4, VSS. Waiting to speak with MD.

## 2018-04-11 NOTE — ANESTHESIA POSTPROCEDURE EVALUATION
"Anesthesia Post Evaluation    Patient: Mari Ambrocio    Procedure(s) Performed: Procedure(s) (LRB):  COLONOSCOPY Golytely (N/A)    Final Anesthesia Type: MAC  Patient location during evaluation: GI PACU  Patient participation: Yes- Able to Participate  Level of consciousness: awake and alert and oriented  Post-procedure vital signs: reviewed and stable  Pain management: adequate  Airway patency: patent  PONV status at discharge: No PONV  Anesthetic complications: no      Cardiovascular status: stable, hemodynamically stable and blood pressure returned to baseline  Respiratory status: room air, unassisted and spontaneous ventilation  Hydration status: euvolemic  Follow-up not needed.        Visit Vitals  /72   Pulse 67   Temp 36.4 °C (97.6 °F)   Resp 16   Ht 5' 5" (1.651 m)   Wt 72.6 kg (160 lb)   SpO2 97%   Breastfeeding? No   BMI 26.63 kg/m²       Pain/Kristina Score: Pain Assessment Performed: Yes (4/11/2018  8:54 AM)  Presence of Pain: denies (4/11/2018  8:54 AM)      "

## 2018-04-11 NOTE — PROVATION PATIENT INSTRUCTIONS
Discharge Summary/Instructions after an Endoscopic Procedure  Patient Name: Mari Ambrocio  Patient MRN: [id number]  Patient YOB: 1960 Wednesday, April 11, 2018  Deya Harrington MD  RESTRICTIONS:  During your procedure today, you received medications for sedation.  These   medications may affect your judgment, balance and coordination.  Therefore,   for 24 hours, you have the following restrictions:   - DO NOT drive a car, operate machinery, make legal/financial decisions,   sign important papers or drink alcohol.    ACTIVITY:  The following day: return to full activity including work, except no heavy   lifting, straining or running for 3 days if polyps were removed.  DIET:  Eat and drink normally unless instructed otherwise.     TREATMENT FOR COMMON SIDE EFFECTS:  - Mild abdominal pain, nausea, belching, bloating or excessive gas:  rest,   eat lightly and use a heating pad.  - Sore Throat: treat with throat lozenges and/or gargle with warm salt   water.  - Because air was used during the procedure, expelling large amounts of air   from your rectum or belching is normal.  - If a bowel prep was taken, you may not have a bowel movement for 1-3 days.    This is normal.  SYMPTOMS TO WATCH FOR AND REPORT TO YOUR PHYSICIAN:  1. Abdominal pain or bloating, other than gas cramps.  2. Chest pain.  3. Back pain.  4. Signs of infection such as: chills or fever occurring within 24 hours   after the procedure.  5. Rectal bleeding, which would show as bright red, maroon, or black stools.   (A tablespoon of blood from the rectum is not serious, especially if   hemorrhoids are present.)  6. Vomiting.  7. Weakness or dizziness.  GO DIRECTLY TO THE NEAREST EMERGENCY ROOM IF YOU HAVE ANY OF THE FOLLOWING:      Difficulty breathing              Chills and/or fever over 101 F   Persistent vomiting and/or vomiting blood   Severe abdominal pain   Severe chest pain   Black, tarry stools   Bleeding- more than one  tablespoon   Any other symptom or condition that you feel may need urgent attention  Your doctor recommends these additional instructions:  If any biopsies were taken, your doctors clinic will contact you in 1 to 2   weeks with any results.  - Discharge patient to home.   - Repeat colonoscopy in 5 years for surveillance.  For questions, problems or results please call your physician - Deya Harrington MD at Work:  [phone].  EMERGENCY PHONE NUMBER: [phone],  LAB RESULTS: [phone]  IF A COMPLICATION OR EMERGENCY SITUATION ARISES AND YOU ARE UNABLE TO REACH   YOUR PHYSICIAN - GO DIRECTLY TO THE EMERGENCY ROOM.  Deya Harrington MD  4/11/2018 11:11:34 AM  This report has been verified and signed electronically.

## 2018-04-11 NOTE — TRANSFER OF CARE
"Anesthesia Transfer of Care Note    Patient: Mari Ambrocio    Procedure(s) Performed: Procedure(s) (LRB):  COLONOSCOPY Golytely (N/A)    Patient location: GI    Anesthesia Type: MAC    Transport from OR: Transported from OR on room air with adequate spontaneous ventilation    Post pain: adequate analgesia    Post assessment: no apparent anesthetic complications    Post vital signs: stable    Level of consciousness: awake, alert and oriented    Nausea/Vomiting: no nausea/vomiting    Complications: none    Transfer of care protocol was followed      Last vitals:   Visit Vitals  /72   Pulse 67   Temp 36.4 °C (97.6 °F)   Resp 16   Ht 5' 5" (1.651 m)   Wt 72.6 kg (160 lb)   SpO2 97%   Breastfeeding? No   BMI 26.63 kg/m²     "

## 2018-04-12 VITALS
OXYGEN SATURATION: 94 % | DIASTOLIC BLOOD PRESSURE: 75 MMHG | TEMPERATURE: 98 F | HEART RATE: 96 BPM | SYSTOLIC BLOOD PRESSURE: 123 MMHG | RESPIRATION RATE: 16 BRPM | WEIGHT: 160 LBS | HEIGHT: 65 IN | BODY MASS INDEX: 26.66 KG/M2

## 2018-04-22 ENCOUNTER — PATIENT MESSAGE (OUTPATIENT)
Dept: GASTROENTEROLOGY | Facility: CLINIC | Age: 58
End: 2018-04-22

## 2018-04-23 ENCOUNTER — PATIENT MESSAGE (OUTPATIENT)
Dept: GASTROENTEROLOGY | Facility: HOSPITAL | Age: 58
End: 2018-04-23

## 2018-04-23 ENCOUNTER — TELEPHONE (OUTPATIENT)
Dept: GASTROENTEROLOGY | Facility: CLINIC | Age: 58
End: 2018-04-23

## 2018-04-23 NOTE — TELEPHONE ENCOUNTER
Informed pt of Pathology report. Verbal; understanding.          Pt  Has been placed on a 5 year recall.

## 2018-04-23 NOTE — TELEPHONE ENCOUNTER
----- Message from Deya Harrington MD sent at 4/23/2018  1:45 PM CDT -----  Pathology report is benign

## 2018-12-18 ENCOUNTER — OFFICE VISIT (OUTPATIENT)
Dept: INTERNAL MEDICINE | Facility: CLINIC | Age: 58
End: 2018-12-18
Payer: COMMERCIAL

## 2018-12-18 VITALS
OXYGEN SATURATION: 98 % | DIASTOLIC BLOOD PRESSURE: 80 MMHG | TEMPERATURE: 98 F | HEART RATE: 59 BPM | WEIGHT: 160.69 LBS | HEIGHT: 65 IN | SYSTOLIC BLOOD PRESSURE: 126 MMHG | BODY MASS INDEX: 26.77 KG/M2

## 2018-12-18 DIAGNOSIS — R21 RASH: ICD-10-CM

## 2018-12-18 DIAGNOSIS — B02.9 HERPES ZOSTER WITHOUT COMPLICATION: Primary | ICD-10-CM

## 2018-12-18 PROCEDURE — 99999 PR PBB SHADOW E&M-EST. PATIENT-LVL IV: CPT | Mod: PBBFAC,,, | Performed by: INTERNAL MEDICINE

## 2018-12-18 PROCEDURE — 3008F BODY MASS INDEX DOCD: CPT | Mod: CPTII,S$GLB,, | Performed by: INTERNAL MEDICINE

## 2018-12-18 PROCEDURE — 99214 OFFICE O/P EST MOD 30 MIN: CPT | Mod: S$GLB,,, | Performed by: INTERNAL MEDICINE

## 2018-12-18 RX ORDER — MUPIROCIN 20 MG/G
OINTMENT TOPICAL 3 TIMES DAILY
Qty: 1 TUBE | Refills: 1 | Status: SHIPPED | OUTPATIENT
Start: 2018-12-18 | End: 2019-01-29

## 2018-12-18 RX ORDER — ACYCLOVIR 800 MG/1
800 TABLET ORAL
Qty: 35 TABLET | Refills: 0 | Status: SHIPPED | OUTPATIENT
Start: 2018-12-18 | End: 2019-01-29

## 2018-12-18 NOTE — PROGRESS NOTES
Subjective:       Patient ID: Mari Ambrocio is a 58 y.o. female.    Chief Complaint: Herpes Zoster (possible)    HPI Mrs. Ambrocio is a 58 year old female who presents with a rash.  She is a patient of Dr. Mckeon and today is her first visit with me today. Patient states the rash has been present for 6 days.  It is worsening.  It is spreading to the back side of the arm.  It 1st appeared 6 days ago when she was walking to dance class.  She was wearing a coat.  She felt like something bit her.  The area became itchy and tingling.  She has scratched the area since that time.  She has not tried any medications on the area.  She has had prior outbreaks of shingles but these occurred on the left side of her body.  Symptom is constant and worsening.  No associated fever.    Review of Systems   Constitutional: Negative for fever.   Skin: Positive for rash.       Objective:      Physical Exam   Constitutional: She is oriented to person, place, and time. She appears well-developed and well-nourished. No distress.   HENT:   Head: Normocephalic and atraumatic.   Eyes: Conjunctivae and EOM are normal.   Neurological: She is alert and oriented to person, place, and time.   Skin: Skin is warm and dry. Rash noted. She is not diaphoretic.        Vesicular lesions on an erythematous base on right upper extremity. One erythematous area on posterior right upper extremity     Psychiatric: She has a normal mood and affect. Her behavior is normal. Judgment and thought content normal.   Vitals reviewed.      Assessment:       1. Herpes zoster without complication    2. Rash        Plan:     1. Rash, possible herpes zoster  Difficult to discern if herpes zoster vs bullous impetigo  Treating with acyclovir and mupirocin ointment  If rash worsens (such as spreading erythema, worsened pain or fever) patient will alert either myself or Dr. Frausto and we will call in oral Abx for treatment of cellulitis  Has follow up appt scheduled with  Dr. Frausto on Dec 26th. If symptoms improve, patient will cancel appt    RTC PRN

## 2018-12-18 NOTE — PATIENT INSTRUCTIONS
If rash worsens (such as spreading redness, drainage of pus-like fluid or fever develops) please alert either Dr. Ely or Dr. Frausto.       Acyclovir tablets or capsules  What is this medicine?  ACYCLOVIR (ay EMANUEL zaldivar veer) is an antiviral medicine. It is used to treat or prevent infections caused by certain kinds of viruses. Examples of these infections include herpes and shingles. This medicine will not cure herpes.  How should I use this medicine?  Take this medicine by mouth with a glass of water. Follow the directions on the prescription label. You can take it with or without food. Take your medicine at regular intervals. Do not take your medicine more often than directed. Take all of your medicine as directed even if you think your are better. Do not skip doses or stop your medicine early.  Talk to your pediatrician regarding the use of this medicine in children. While this drug may be prescribed for selected conditions, precautions do apply.  What side effects may I notice from receiving this medicine?  Side effects that you should report to your doctor or health care professional as soon as possible:  · allergic reactions like skin rash, itching or hives, swelling of the face, lips, or tongue  · chest pain  · confusion, hallucinations, tremor  · dark urine  · increased sensitivity to the sun  · redness, blistering, peeling or loosening of the skin, including inside the mouth  · seizures  · trouble passing urine or change in the amount of urine  · unusual bleeding or bruising, or pinpoint red spots on the skin  · unusually weak or tired  · yellowing of the eyes or skin  Side effects that usually do not require medical attention (report to your doctor or health care professional if they continue or are bothersome):  · diarrhea  · fever  · headache  · nausea, vomiting  · stomach upset  What may interact with this medicine?  · probenecid  What if I miss a dose?  If you miss a dose, take it as soon as you  can. If it is almost time for your next dose, take only that dose. Do not take double or extra doses.  Where should I keep my medicine?  Keep out of the reach of children.  Store at room temperature between 15 and 25 degrees C (59 and 77 degrees F). Throw away any unused medicine after the expiration date.  What should I tell my health care provider before I take this medicine?  They need to know if you have any of these conditions:  · kidney disease  · an unusual or allergic reaction to acyclovir, ganciclovir, valacyclovir, other medicines, foods, dyes, or preservatives  · pregnant or trying to get pregnant  · breast-feeding  What should I watch for while using this medicine?  Tell your doctor or health care professional if your symptoms do not improve. This medicine works best when started very early in the course of an infection. Begin treatment at the first signs of infection.  Drink 6 to 8 glasses of water or fluids every day while you are taking this medicine. This will help prevent side effects.  You can still pass chickenpox, shingles, or herpes to another person even while you are taking this medicine. Avoid contact with others as directed. Genital herpes is a sexually transmitted disease. Talk to your doctor about how to stop the spread of infection.  NOTE:This sheet is a summary. It may not cover all possible information. If you have questions about this medicine, talk to your doctor, pharmacist, or health care provider. Copyright© 2017 [employer]

## 2019-01-28 ENCOUNTER — PATIENT MESSAGE (OUTPATIENT)
Dept: INTERNAL MEDICINE | Facility: CLINIC | Age: 59
End: 2019-01-28

## 2019-01-29 ENCOUNTER — OFFICE VISIT (OUTPATIENT)
Dept: INTERNAL MEDICINE | Facility: CLINIC | Age: 59
End: 2019-01-29
Payer: COMMERCIAL

## 2019-01-29 ENCOUNTER — LAB VISIT (OUTPATIENT)
Dept: LAB | Facility: HOSPITAL | Age: 59
End: 2019-01-29
Attending: INTERNAL MEDICINE
Payer: COMMERCIAL

## 2019-01-29 VITALS
BODY MASS INDEX: 27.15 KG/M2 | WEIGHT: 162.94 LBS | SYSTOLIC BLOOD PRESSURE: 124 MMHG | HEART RATE: 64 BPM | OXYGEN SATURATION: 97 % | HEIGHT: 65 IN | DIASTOLIC BLOOD PRESSURE: 88 MMHG

## 2019-01-29 DIAGNOSIS — Z00.00 ANNUAL PHYSICAL EXAM: Primary | ICD-10-CM

## 2019-01-29 DIAGNOSIS — Z12.31 BREAST CANCER SCREENING BY MAMMOGRAM: ICD-10-CM

## 2019-01-29 DIAGNOSIS — Z11.4 ENCOUNTER FOR SPECIAL SCREENING EXAMINATION FOR HIV: ICD-10-CM

## 2019-01-29 DIAGNOSIS — Z23 NEED FOR SHINGLES VACCINE: ICD-10-CM

## 2019-01-29 DIAGNOSIS — Z13.220 ENCOUNTER FOR LIPID SCREENING FOR CARDIOVASCULAR DISEASE: ICD-10-CM

## 2019-01-29 DIAGNOSIS — Z13.6 ENCOUNTER FOR LIPID SCREENING FOR CARDIOVASCULAR DISEASE: ICD-10-CM

## 2019-01-29 DIAGNOSIS — R03.0 TRANSIENT ELEVATED BLOOD PRESSURE: ICD-10-CM

## 2019-01-29 DIAGNOSIS — Z86.19 HISTORY OF HERPES ZOSTER: ICD-10-CM

## 2019-01-29 DIAGNOSIS — R25.2 CALF CRAMP: ICD-10-CM

## 2019-01-29 DIAGNOSIS — Z00.00 ANNUAL PHYSICAL EXAM: ICD-10-CM

## 2019-01-29 DIAGNOSIS — N81.10 FEMALE CYSTOCELE: ICD-10-CM

## 2019-01-29 DIAGNOSIS — L23.9 ALLERGIC CONTACT DERMATITIS, UNSPECIFIED TRIGGER: ICD-10-CM

## 2019-01-29 LAB
ALBUMIN SERPL BCP-MCNC: 4 G/DL
ALP SERPL-CCNC: 78 U/L
ALT SERPL W/O P-5'-P-CCNC: 16 U/L
ANION GAP SERPL CALC-SCNC: 8 MMOL/L
AST SERPL-CCNC: 17 U/L
BILIRUB SERPL-MCNC: 0.5 MG/DL
BUN SERPL-MCNC: 9 MG/DL
CALCIUM SERPL-MCNC: 9.8 MG/DL
CHLORIDE SERPL-SCNC: 105 MMOL/L
CHOLEST SERPL-MCNC: 202 MG/DL
CHOLEST/HDLC SERPL: 3.5 {RATIO}
CO2 SERPL-SCNC: 27 MMOL/L
CREAT SERPL-MCNC: 0.8 MG/DL
EST. GFR  (AFRICAN AMERICAN): >60 ML/MIN/1.73 M^2
EST. GFR  (NON AFRICAN AMERICAN): >60 ML/MIN/1.73 M^2
GLUCOSE SERPL-MCNC: 97 MG/DL
HDLC SERPL-MCNC: 57 MG/DL
HDLC SERPL: 28.2 %
LDLC SERPL CALC-MCNC: 126 MG/DL
NONHDLC SERPL-MCNC: 145 MG/DL
POTASSIUM SERPL-SCNC: 4.2 MMOL/L
PROT SERPL-MCNC: 7.2 G/DL
SODIUM SERPL-SCNC: 140 MMOL/L
TRIGL SERPL-MCNC: 95 MG/DL

## 2019-01-29 PROCEDURE — 80053 COMPREHEN METABOLIC PANEL: CPT

## 2019-01-29 PROCEDURE — 80061 LIPID PANEL: CPT

## 2019-01-29 PROCEDURE — 99396 PR PREVENTIVE VISIT,EST,40-64: ICD-10-PCS | Mod: S$GLB,,, | Performed by: INTERNAL MEDICINE

## 2019-01-29 PROCEDURE — 99999 PR PBB SHADOW E&M-EST. PATIENT-LVL IV: CPT | Mod: PBBFAC,,, | Performed by: INTERNAL MEDICINE

## 2019-01-29 PROCEDURE — 36415 COLL VENOUS BLD VENIPUNCTURE: CPT | Mod: PO

## 2019-01-29 PROCEDURE — 99396 PREV VISIT EST AGE 40-64: CPT | Mod: S$GLB,,, | Performed by: INTERNAL MEDICINE

## 2019-01-29 PROCEDURE — 99999 PR PBB SHADOW E&M-EST. PATIENT-LVL IV: ICD-10-PCS | Mod: PBBFAC,,, | Performed by: INTERNAL MEDICINE

## 2019-01-29 NOTE — PROGRESS NOTES
Portions of this note are generated with voice recognition software. Typographical errors may exist.     SUBJECTIVE:    This is a/an 58 y.o. female here for primary care visit for  Chief Complaint   Patient presents with    Annual Exam     Patient states that she remains physically active.  Line dancing frequently.  Occasional cramping of the left calf muscle.  Not associated with any problems with diarrheal illness or nausea vomiting.    Patient states that doing Kegel exercises helps with known cystocele.  States that she does not have bladder leakage with aerobic activity.    Seperated from previous partner. New relationship mutually monogomous with male partner for past 2 years. Does not know partner HIV status. Assumes negative. Does not perceive partner risk for HIV. Partner has former  background     No problems with insomnia.  States that mood is doing much better after separation from previous partner.    Patient states that she had vesicles developed on the antecubital region but she did not have a classic zoster distribution because she also had vesicles on the extensor surface of her right upper extremity.  Patient said that the rash was very pruritic.  She has had vesicular eruptions on the left upper extremity in the past as well.  These also may not have been classic for zoster.  She did have an eruption on her forehead many years ago before 2012 that was more classic for a zoster distribution.  She has not had vaccination again shingles.    Answers for HPI/ROS submitted by the patient on 1/27/2019   activity change: No  unexpected weight change: No  rhinorrhea: No  trouble swallowing: No  visual disturbance: No  chest tightness: No  polyuria: No  difficulty urinating: No  menstrual problem: No  joint swelling: No  arthralgias: No  confusion: No  dysphoric mood: No      Medications Reviewed and Updated    Past medical, family, and social histories were reviewed and updated.    Review of  Systems negative unless otherwise noted in history of present illness-  Review of Systems   HENT: Negative for hearing loss.    Eyes: Negative for discharge.   Respiratory: Negative for wheezing.    Cardiovascular: Negative for chest pain and palpitations.   Gastrointestinal: Negative for blood in stool, constipation, diarrhea and vomiting.   Genitourinary: Negative for dysuria and hematuria.   Musculoskeletal: Negative for neck pain.   Skin: Negative for rash.   Neurological: Negative for weakness and headaches.   Endo/Heme/Allergies: Negative for polydipsia.   Psychiatric/Behavioral: The patient is not nervous/anxious.        Allergic:    Review of patient's allergies indicates:   Allergen Reactions    Bananas [banana]     Cantaloupe     Cucumbers [cucumber fruit extract]     [place]     Watermelon        OBJECTIVE:  BP: 124/88 Pulse: 64    Wt Readings from Last 3 Encounters:   01/29/19 73.9 kg (162 lb 14.7 oz)   12/18/18 72.9 kg (160 lb 11.5 oz)   04/11/18 72.6 kg (160 lb)    Body mass index is 27.11 kg/m².  Previous Blood Pressure Readings :   BP Readings from Last 3 Encounters:   01/29/19 124/88   12/18/18 126/80   04/11/18 123/75       Physical Exam    GEN: No apparent distress  HEENT: sclera non-icteric, conjunctiva clear  CV: no peripheral edema.  Regular rate and rhythm. No murmurs.  No carotid bruits.  PULM: breathing non-labored  ABD: non, protuberant abdomen.  PSYCH: appropriate affect  MSK: able to rise from chair without assistance  SKIN: normal skin turgor    Pertinent Labs Reviewed       ASSESSMENT/PLAN:    Annual physical exam  -     Mammo Digital Screening Bilat; Standing  -     Comprehensive metabolic panel; Standing  -     Lipid panel; Standing    Encounter for lipid screening for cardiovascular disease  -     Lipid panel; Standing    Breast cancer screening by mammogram  -     Mammo Digital Screening Bilat; Standing    Transient elevated blood pressure  -     Comprehensive metabolic panel;  Standing    Female cystocele.Condition stable.  Counseling given today on self-care measures. Plan to monitor clinically. Continue current medical plan.     Encounter for special screening examination for HIV  -     HIV 1/2 Ag/Ab (4th Gen); Standing    Need for shingles vaccine  -     varicella-zoster [id number], PF, (SHINGRIX, PF,) 50 mcg/0.5 mL injection; Inject 0.5 mLs into the muscle As instructed. 2 doses  Dispense: 0.5 mL; Refill: 2    Calf cramp.Condition stable.  Counseling given today on self-care measures. Plan to monitor clinically. Continue current medical plan.     Allergic contact dermatitis, unspecified trigger    History of herpes zoster      Future Appointments   Date Time Provider Department Center   2/5/2019  2:45 PM [facility]1 [facility]   1/30/2020  7:30 AM [facility]  2/3/2019  8:22 AM

## 2019-02-05 ENCOUNTER — HOSPITAL ENCOUNTER (OUTPATIENT)
Dept: RADIOLOGY | Facility: HOSPITAL | Age: 59
Discharge: HOME | End: 2019-02-05
Attending: INTERNAL MEDICINE
Payer: COMMERCIAL

## 2019-02-05 DIAGNOSIS — Z12.31 BREAST CANCER SCREENING BY MAMMOGRAM: ICD-10-CM

## 2019-02-05 DIAGNOSIS — Z00.00 ANNUAL PHYSICAL EXAM: ICD-10-CM

## 2019-02-05 PROCEDURE — 77067 SCR MAMMO BI INCL CAD: CPT | Mod: 26,,, | Performed by: RADIOLOGY

## 2019-02-05 PROCEDURE — 77063 MAMMO DIGITAL SCREENING BILAT WITH TOMOSYNTHESIS_CAD: ICD-10-PCS | Mod: 26,,, | Performed by: RADIOLOGY

## 2019-02-05 PROCEDURE — 77063 BREAST TOMOSYNTHESIS BI: CPT | Mod: 26,,, | Performed by: RADIOLOGY

## 2019-02-05 PROCEDURE — 77067 SCR MAMMO BI INCL CAD: CPT | Mod: TC

## 2019-02-05 PROCEDURE — 77067 MAMMO DIGITAL SCREENING BILAT WITH TOMOSYNTHESIS_CAD: ICD-10-PCS | Mod: 26,,, | Performed by: RADIOLOGY

## 2020-01-23 ENCOUNTER — TELEPHONE (OUTPATIENT)
Dept: INTERNAL MEDICINE | Facility: CLINIC | Age: 60
End: 2020-01-23

## 2020-01-23 NOTE — TELEPHONE ENCOUNTER
----- Message from Vannessa Golden sent at 1/23/2020  1:01 PM CST -----  Contact: Pt   Pt retuning a call    Please call and advise    Phone [phone]

## 2020-01-23 NOTE — TELEPHONE ENCOUNTER
----- Message from Aniya Kasper sent at 1/23/2020 10:17 AM CST -----  Pt is requesting a call back to see doctor,pt refused all available appointments offered,she will like to be seen this month and not in Feb.  Please call and advise              Thank you

## 2020-01-30 ENCOUNTER — LAB VISIT (OUTPATIENT)
Dept: LAB | Facility: HOSPITAL | Age: 60
End: 2020-01-30
Attending: INTERNAL MEDICINE
Payer: COMMERCIAL

## 2020-01-30 DIAGNOSIS — Z00.00 ANNUAL PHYSICAL EXAM: ICD-10-CM

## 2020-01-30 DIAGNOSIS — R03.0 TRANSIENT ELEVATED BLOOD PRESSURE: ICD-10-CM

## 2020-01-30 DIAGNOSIS — Z13.6 ENCOUNTER FOR LIPID SCREENING FOR CARDIOVASCULAR DISEASE: ICD-10-CM

## 2020-01-30 DIAGNOSIS — Z13.220 ENCOUNTER FOR LIPID SCREENING FOR CARDIOVASCULAR DISEASE: ICD-10-CM

## 2020-01-30 LAB
ALBUMIN SERPL BCP-MCNC: 4 G/DL (ref 3.5–5.2)
ALP SERPL-CCNC: 88 U/L (ref 55–135)
ALT SERPL W/O P-5'-P-CCNC: 15 U/L (ref 10–44)
ANION GAP SERPL CALC-SCNC: 7 MMOL/L (ref 8–16)
AST SERPL-CCNC: 16 U/L (ref 10–40)
BILIRUB SERPL-MCNC: 0.5 MG/DL (ref 0.1–1)
BUN SERPL-MCNC: 15 MG/DL (ref 6–20)
CALCIUM SERPL-MCNC: 9.9 MG/DL (ref 8.7–10.5)
CHLORIDE SERPL-SCNC: 109 MMOL/L (ref 95–110)
CHOLEST SERPL-MCNC: 211 MG/DL (ref 120–199)
CHOLEST/HDLC SERPL: 3.6 {RATIO} (ref 2–5)
CO2 SERPL-SCNC: 27 MMOL/L (ref 23–29)
CREAT SERPL-MCNC: 0.9 MG/DL (ref 0.5–1.4)
EST. GFR  (AFRICAN AMERICAN): >60 ML/MIN/1.73 M^2
EST. GFR  (NON AFRICAN AMERICAN): >60 ML/MIN/1.73 M^2
GLUCOSE SERPL-MCNC: 89 MG/DL (ref 70–110)
HDLC SERPL-MCNC: 59 MG/DL (ref 40–75)
HDLC SERPL: 28 % (ref 20–50)
LDLC SERPL CALC-MCNC: 132.8 MG/DL (ref 63–159)
NONHDLC SERPL-MCNC: 152 MG/DL
POTASSIUM SERPL-SCNC: 4.3 MMOL/L (ref 3.5–5.1)
PROT SERPL-MCNC: 7.3 G/DL (ref 6–8.4)
SODIUM SERPL-SCNC: 143 MMOL/L (ref 136–145)
TRIGL SERPL-MCNC: 96 MG/DL (ref 30–150)

## 2020-01-30 PROCEDURE — 36415 COLL VENOUS BLD VENIPUNCTURE: CPT | Mod: PO

## 2020-01-30 PROCEDURE — 80053 COMPREHEN METABOLIC PANEL: CPT

## 2020-01-30 PROCEDURE — 80061 LIPID PANEL: CPT

## 2020-02-06 ENCOUNTER — HOSPITAL ENCOUNTER (OUTPATIENT)
Dept: RADIOLOGY | Facility: HOSPITAL | Age: 60
Discharge: HOME | End: 2020-02-06
Attending: INTERNAL MEDICINE
Payer: COMMERCIAL

## 2020-02-06 DIAGNOSIS — Z00.00 ANNUAL PHYSICAL EXAM: ICD-10-CM

## 2020-02-06 DIAGNOSIS — Z12.31 BREAST CANCER SCREENING BY MAMMOGRAM: ICD-10-CM

## 2020-02-06 PROCEDURE — 77063 MAMMO DIGITAL SCREENING BILAT WITH TOMOSYNTHESIS_CAD: ICD-10-PCS | Mod: 26,,, | Performed by: RADIOLOGY

## 2020-02-06 PROCEDURE — 77067 SCR MAMMO BI INCL CAD: CPT | Mod: 26,,, | Performed by: RADIOLOGY

## 2020-02-06 PROCEDURE — 77067 SCR MAMMO BI INCL CAD: CPT | Mod: TC

## 2020-02-06 PROCEDURE — 77067 MAMMO DIGITAL SCREENING BILAT WITH TOMOSYNTHESIS_CAD: ICD-10-PCS | Mod: 26,,, | Performed by: RADIOLOGY

## 2020-02-06 PROCEDURE — 77063 BREAST TOMOSYNTHESIS BI: CPT | Mod: 26,,, | Performed by: RADIOLOGY

## 2020-02-11 ENCOUNTER — TELEPHONE (OUTPATIENT)
Dept: FAMILY MEDICINE | Facility: CLINIC | Age: 60
End: 2020-02-11

## 2020-02-11 ENCOUNTER — OFFICE VISIT (OUTPATIENT)
Dept: INTERNAL MEDICINE | Facility: CLINIC | Age: 60
End: 2020-02-11
Payer: COMMERCIAL

## 2020-02-11 VITALS
HEART RATE: 67 BPM | SYSTOLIC BLOOD PRESSURE: 120 MMHG | WEIGHT: 165.81 LBS | DIASTOLIC BLOOD PRESSURE: 68 MMHG | OXYGEN SATURATION: 98 % | BODY MASS INDEX: 27.63 KG/M2 | HEIGHT: 65 IN

## 2020-02-11 DIAGNOSIS — Z00.00 ANNUAL PHYSICAL EXAM: Primary | ICD-10-CM

## 2020-02-11 DIAGNOSIS — N95.0 POST-MENOPAUSAL BLEEDING: ICD-10-CM

## 2020-02-11 DIAGNOSIS — Z11.4 SCREENING FOR HIV WITHOUT PRESENCE OF RISK FACTORS: ICD-10-CM

## 2020-02-11 DIAGNOSIS — N81.4 CYSTOCELE WITH PROLAPSE: ICD-10-CM

## 2020-02-11 PROCEDURE — 99999 PR PBB SHADOW E&M-EST. PATIENT-LVL IV: ICD-10-PCS | Mod: PBBFAC,,, | Performed by: INTERNAL MEDICINE

## 2020-02-11 PROCEDURE — 99396 PREV VISIT EST AGE 40-64: CPT | Mod: S$GLB,,, | Performed by: INTERNAL MEDICINE

## 2020-02-11 PROCEDURE — 99999 PR PBB SHADOW E&M-EST. PATIENT-LVL IV: CPT | Mod: PBBFAC,,, | Performed by: INTERNAL MEDICINE

## 2020-02-11 PROCEDURE — 99396 PR PREVENTIVE VISIT,EST,40-64: ICD-10-PCS | Mod: S$GLB,,, | Performed by: INTERNAL MEDICINE

## 2020-02-11 NOTE — PROGRESS NOTES
Portions of this note are generated with voice recognition software. Typographical errors may exist.     SUBJECTIVE:    This is a/an 59 y.o. female here for primary care visit for  Chief Complaint   Patient presents with    Annual Exam       Patient states that around December she noticed some vaginal bleeding.  About the size of a quarter in the underwear.  Patient states that she noticed this before intercourse.  During intercourse there was not any painful symptoms.  Feels that vaginal bleeding resolved after this episode.  Some associated lower back pain.   Patient has assist to seal.  States that she has episodes of pulling sensation and pelvic discomfort at times.  Denies problems with dysuria.  No urinary retention.  States that with laughing sneezing and coughing she does have some control of urine.  She is contemplating having surgical evaluation.   Patient states that in the past she has had a history of endometrial polyp  However primary records are not available today.  Patient declines pelvic examination today but is interested to have definitive transvaginal ultrasound to further evaluate abnormal postmenopausal bleeding.  Patient does not feel that bleeding was rectal.  Occasional constipation but no rectal pain.  No blood with wiping.        Answers for HPI/ROS submitted by the patient on 2/4/2020   activity change: No  unexpected weight change: No  rhinorrhea: No  trouble swallowing: No  visual disturbance: No  chest tightness: No  polyuria: No  difficulty urinating: No  menstrual problem: No  joint swelling: No  arthralgias: No  confusion: No  dysphoric mood: No      Medications Reviewed and Updated    Past medical, family, and social histories were reviewed and updated.    Review of Systems negative unless otherwise noted in history of present illness-  ROS    General ROS: negative  Psychological ROS: negative  ENT ROS: negative  Endocrine ROS: Negative  Allergy and Immunology ROS:  negative  Cardiovascular ROS: negative  Pulmonary ROS: Negative  Gastrointestinal ROS: negative  Genito-Urinary ROS: negative  Musculoskeletal ROS: negative  Neurological ROS: negative  Dermatological ROS: negative        Allergic:    Review of patient's allergies indicates:   Allergen Reactions    Bananas [banana]     Cantaloupe     Cucumbers [cucumber fruit extract]     [place]     Watermelon        OBJECTIVE:  BP: 120/68 Pulse: 67    Wt Readings from Last 3 Encounters:   02/11/20 75.2 kg (165 lb 12.6 oz)   01/29/19 73.9 kg (162 lb 14.7 oz)   12/18/18 72.9 kg (160 lb 11.5 oz)    Body mass index is 27.59 kg/m².  Previous Blood Pressure Readings :   BP Readings from Last 3 Encounters:   02/11/20 120/68   01/29/19 124/88   12/18/18 126/80       Physical Exam    GEN: No apparent distress  HEENT: sclera non-icteric, conjunctiva clear  CV: no peripheral edema  Regular rate and rhythm.  No murmurs.  PULM: breathing non-labored  ABD: non protuberant abdomen.  PSYCH: appropriate affect  MSK: able to rise from chair without assistance  SKIN: normal skin turgor    Pertinent Labs Reviewed       ASSESSMENT/PLAN:    Annual physical exam    Post-menopausal bleeding.Etiology unclear. Not controlled. Further evaluation warranted.  Recommendations as below or as written on After Visit Summary.   -     US OB Transvaginal; Future; Expected date: 02/11/2020    Cystocele with prolapse.Condition not optimally controlled. Detailed counseling on self care measures. Plan to monitor clinically in addition to plan below or as listed on After Visit Summary.   -     Ambulatory referral/consult to Urogynecology; Future; Expected date: 02/18/2020    Screening for HIV without presence of risk factors  -     HIV 1/2 Ag/Ab (4th Gen); Future; Expected date: 02/11/2020          Future Appointments   Date Time Provider Department Center   2/21/2020 10:15 AM [facility] 2 LIA STEV[facility]   3/25/2020  2:00 PM Corey Riley MD [facility] UROGYN  [facility]   8/6/2020  8:00 AM LAB, [facility]   8/6/2020  8:15 AM LAB, [facility]   8/11/2020  1:20 PM Silvio Mckeon MD [facility]  2/11/2020  3:19 PM

## 2020-02-21 ENCOUNTER — HOSPITAL ENCOUNTER (OUTPATIENT)
Dept: RADIOLOGY | Facility: HOSPITAL | Age: 60
Discharge: HOME | End: 2020-02-21
Attending: INTERNAL MEDICINE
Payer: COMMERCIAL

## 2020-02-21 DIAGNOSIS — N95.0 POST-MENOPAUSAL BLEEDING: ICD-10-CM

## 2020-02-21 PROCEDURE — 76856 US EXAM PELVIC COMPLETE: CPT | Mod: 26,,, | Performed by: RADIOLOGY

## 2020-02-21 PROCEDURE — 76830 US PELVIS COMP WITH TRANSVAG NON-OB (XPD): ICD-10-PCS | Mod: 26,,, | Performed by: RADIOLOGY

## 2020-02-21 PROCEDURE — 76830 TRANSVAGINAL US NON-OB: CPT | Mod: 26,,, | Performed by: RADIOLOGY

## 2020-02-21 PROCEDURE — 76856 US EXAM PELVIC COMPLETE: CPT | Mod: TC

## 2020-02-21 PROCEDURE — 76856 US PELVIS COMP WITH TRANSVAG NON-OB (XPD): ICD-10-PCS | Mod: 26,,, | Performed by: RADIOLOGY

## 2020-02-23 ENCOUNTER — PATIENT MESSAGE (OUTPATIENT)
Dept: INTERNAL MEDICINE | Facility: CLINIC | Age: 60
End: 2020-02-23

## 2020-03-17 ENCOUNTER — TELEPHONE (OUTPATIENT)
Dept: UROGYNECOLOGY | Facility: CLINIC | Age: 60
End: 2020-03-17

## 2020-03-17 NOTE — TELEPHONE ENCOUNTER
Spoke to patient 3/17 about options of virtual visit vs rescheduling her 3/25 appt due to COVD-19 concern. Patient wishes to come to clinic for visit. She is not interested in virtual visit at this time, but says that if things change, she will call to either set up MyChart or reschedule.

## 2020-03-25 ENCOUNTER — INITIAL CONSULT (OUTPATIENT)
Dept: UROGYNECOLOGY | Facility: CLINIC | Age: 60
End: 2020-03-25
Payer: COMMERCIAL

## 2020-03-25 VITALS
WEIGHT: 165.56 LBS | SYSTOLIC BLOOD PRESSURE: 148 MMHG | DIASTOLIC BLOOD PRESSURE: 79 MMHG | HEIGHT: 65 IN | BODY MASS INDEX: 27.58 KG/M2

## 2020-03-25 DIAGNOSIS — R93.89 ABNORMAL PELVIC ULTRASOUND: ICD-10-CM

## 2020-03-25 DIAGNOSIS — N95.0 POSTMENOPAUSAL BLEEDING: Primary | ICD-10-CM

## 2020-03-25 DIAGNOSIS — N81.4 CYSTOCELE WITH PROLAPSE: ICD-10-CM

## 2020-03-25 DIAGNOSIS — N81.6 RECTOCELE, FEMALE: ICD-10-CM

## 2020-03-25 DIAGNOSIS — N39.41 URGE URINARY INCONTINENCE: ICD-10-CM

## 2020-03-25 DIAGNOSIS — N81.4 UTEROVAGINAL PROLAPSE: ICD-10-CM

## 2020-03-25 DIAGNOSIS — N84.1 CERVICAL POLYP: ICD-10-CM

## 2020-03-25 PROCEDURE — 99245 PR OFFICE CONSULTATION,LEVEL V: ICD-10-PCS | Mod: 25,S$GLB,, | Performed by: OBSTETRICS & GYNECOLOGY

## 2020-03-25 PROCEDURE — 99245 OFF/OP CONSLTJ NEW/EST HI 55: CPT | Mod: 25,S$GLB,, | Performed by: OBSTETRICS & GYNECOLOGY

## 2020-03-25 PROCEDURE — 88305 TISSUE EXAM BY PATHOLOGIST: CPT | Performed by: PATHOLOGY

## 2020-03-25 PROCEDURE — 87086 URINE CULTURE/COLONY COUNT: CPT

## 2020-03-25 PROCEDURE — 99999 PR PBB SHADOW E&M-EST. PATIENT-LVL III: CPT | Mod: PBBFAC,,, | Performed by: OBSTETRICS & GYNECOLOGY

## 2020-03-25 PROCEDURE — 58100 PR BIOPSY OF UTERUS LINING: ICD-10-PCS | Mod: S$GLB,,, | Performed by: OBSTETRICS & GYNECOLOGY

## 2020-03-25 PROCEDURE — 99999 PR PBB SHADOW E&M-EST. PATIENT-LVL III: ICD-10-PCS | Mod: PBBFAC,,, | Performed by: OBSTETRICS & GYNECOLOGY

## 2020-03-25 PROCEDURE — 88305 TISSUE EXAM BY PATHOLOGIST: CPT | Mod: 26,,, | Performed by: PATHOLOGY

## 2020-03-25 PROCEDURE — 88305 TISSUE EXAM BY PATHOLOGIST: ICD-10-PCS | Mod: 26,,, | Performed by: PATHOLOGY

## 2020-03-25 PROCEDURE — 58100 BIOPSY OF UTERUS LINING: CPT | Mod: S$GLB,,, | Performed by: OBSTETRICS & GYNECOLOGY

## 2020-03-25 NOTE — LETTER
March 25, 2020      Silvio Mckeon MD  [address] [facility] [address]           [facility] UroGynecology-[address] [address]  Phone: [phone]          Patient: Mari Ambrocio   MR Number: [id number]   YOB: 1960   Date of Visit: 3/25/2020       Dear Dr. Silvio Mckeon:    Thank you for referring Mari Ambrocio to me for evaluation. Attached you will find relevant portions of my assessment and plan of care.    If you have questions, please do not hesitate to call me. I look forward to following Mari Ambrocio along with you.    Sincerely,    Corey Riley MD    Enclosure  CC:  No Recipients    If you would like to receive this communication electronically, please contact [email or web address] or [phone] to request more information on [employer] access.    For providers and/or their staff who would like to refer a patient to [facility], please contact us through our one-stop-shop provider referral line, [facility], at [phone].    If you feel you have received this communication in error or would no longer like to receive these types of communications, please e-mail [email or web address]

## 2020-03-25 NOTE — PROGRESS NOTES
[facility] UROGYNECOLOGY-[id number]   [address] [address]    Mari Ambrocio  [id number]  1960 March 25, 2020    Consulting Physician: Silvio Mckeon.*   GYN: MD Olivia  Primary M.D.: Silvio Mckeon MD    Chief Complaint   Patient presents with    Consult     cystocele       HPI:     1)      Ohs Peq [id number]     Question 3/21/2020 10:43 AM CDT - Filed by Patient on 3/21/2020   Do you...    Usually experience pressure in the lower abdomen? Symptoms not present   Usually experience heaviness or dullness in the pelvic area? Symptoms not present   Usually have a bulge or something falling out that you can see or feel in your vaginal area? Symptoms present and they bother me somewhat; present slightly past introitus; mostly noted when wiping; no major bother symptoms--has been doing Kegels a lot, which is helping   Ever have to push on the vagina or around the rectum to complete a bowel movement? Symptoms not present   Usually experience a feeling of incomplete bladder emptying? Symptoms not present   Ever have to push up on a bulge in the vaginal area with your fingers to start or complete urination? Symptoms not present   Do you...    Feel you need to strain too hard to have a bowel movement? Symptoms not present   Feel you have not completely emptied your bowels at the end of a bowel movement?  Symptoms not present   Usually lose stool beyond your control if your stool is well formed? Symptoms not present   Usually lose stool beyond your control if your stool is loose? Symptoms not present   Usually lose gas from your rectum beyond your control? Symptoms not present   Usually have pain when you pass your stool? Symptoms not present   Experience a strong sense of urgency and have to rush to the bathroom to have a bowel movement? Symptoms not present   Does part of your bowel ever pass through the rectum and bulge outside during or after a bowel movement? Symptoms  not present   Do you...     Usually experience frequent urination? Symptoms not present   Usually experience urine leakage associated with a feeling of urgency, that is, a strong sensation of needing to go to the bathroom? Symptoms not present   Usually experience urine leakage related to coughing, sneezing or laughing? Symptoms not present   Usually experience small amounts of urine leakage (that is, drops)? Symptoms present but they do not bother me at all   Usually experience difficulty emptying your bladder? Symptoms present but they do not bother me at all; PV to help   Usually experience pain or discomfort in the lower abdomen or genital region? Symptoms not present   POPDI  (range: 0 - 100) 8.33   CRADI (range: 0 - 100) 0   KEYANA (range: 0 - 100) 8.33   TOTAL SCORE  (range: 0 - 300) 16.66   Ohs Peq Urogyn Hpi     Question 3/21/2020 10:56 AM CDT - Filed by Patient on 3/21/2020   General Urogynecology: Are you experiencing the following?    Dysuria (painful urination) No   Nocturia:  waking up at night to empty your bladder  Yes   If you answered yes to the previous question, how many times does this happen per night? 1   Enuresis (urine loss during sleep) No   Dribbling urine after you urinate No   Hematuria (urine appears red) Yes   Type of stream Strong   Urinary frequency: How often a day are you going to the bathroom per day?  Less than 10; Q4h   Urinary Tract Infections: How many Urinary Tract Infections have you had in the past year? I have not had a UTI in the past year   If you have had a UTI in the past year, what treatments have you had so far?  I have not had a UTI in the past year   Urinary Incontinence (General): Are you experiencing the following?    Past consultation for incontinence: Have you ever seen someone for the evaluation of incontinence? No   If you answered yes to the previous question, please select all the therapies you have tried.  N/a- I answered no to the previous question   Please  "note the effectiveness of the therapies.    Need to wear protection to keep clothes dry  No   If you answered yes to the previous question, please mathew the protection you use.  None   If you wear protection, how much wetness is typically on each pad? N/a- I do not wear protection   If you wear protection, how often do you have to change per day, if applicable?  0   Stress Symptoms: Are you experiencing the following?    Leakage of urine with cough, laugh and/or sneeze No   If you answered yes to the previous question, what is the frequency in days, weeks and/or months? Never   Leakage of urine with sex No   Leakage of urine with bending/ lifting No   Leakage of urine with briskly walking or jogging No   If you lose urine for any other reason not previously mentioned, please note it below, if applicable.     Urge Symptoms: Are you experiencing the following?    Urgency ("got to go" feeling) No   Urge: How frequently do you feel an urge to urinate (feeling like you "gotta go" to the bathroom and can't wait) Never   Do you experience a leakage of urine when you have a feeling of urgency?  No   Leakage of urine when unaware No   Past use of anticholinergics (medications used to treat overactive bladder) No   If you answered yes to the previous question, please mathew the anticholinergics you have used:     Have you ever used Mirbetriq (aka Mirabegron)?  No   Prolapse Symptoms: Are you experiencing any of the following?     Falling out/ Bulging/ Heaviness in the vagina Yes   Vaginal/ Abdominal Pain/ Pressure No   Need to strain/ Push to void No   Need to wait on the toilet before you void Yes   Unusual position to urinate (using your hands to push back the vaginal bulge) Yes   Sensation of incomplete emptying No   Past use of pessary device No   If you answered yes to the previous question, please list the devices you have used below.     Bowel Symptoms: Are you experiencing any of the following?    Constipation No "   Diarrhea  No   Hematochezia (bloody stool) No   Incomplete evacuation of stool No   Involuntary loss of formed stool No   Fecal smearing/urgency No   Involuntary loss of gas No   Vaginal Symptoms: Are you experiencing any of the following?     Abnormal vaginal bleeding  Yes: see below   Vaginal dryness No   Sexually active  Yes   Dyspareunia (painful intercourse) No   Estrogen use  No   Ohs Peq Pelvic Pain Urogyn     Question 3/21/2020 10:56 AM CDT - Filed by Patient on 3/21/2020   Are you experiencing pelvic pain?  No     2)   bleeding/spotting:  --x 1 in 2019; mild spotting; pre-coital spotting x 1  --told PCP  --pelvic US 2020:  Uterus:  Size: 6.8 x 2.9 x 5.1 cm  Masses: There is a small uterine fibroid, intramural, less than 1 cm.  Endometrium: Thickened in this post menopausal patient, measuring 6 mm.  There is a an oval isoechoic mass within the cervix measuring 0.6 x 0.6 x 0.7 cm with smooth contour.  The bilateral ovaries were not seen.  The bilateral adnexa appear normal.  Free Fluid:  Trace of free fluid with septation.    Past Medical History  Past Medical History:   Diagnosis Date    Ganglion cyst     History of shingles     Hyperlipidemia     Kidney stone     Otitis media     Sciatica     Urinary tract infection    Kidney stone: 1 spontaneous passage; follows diet     Past Surgical History  Past Surgical History:   Procedure Laterality Date    COLONOSCOPY N/A 2018    Procedure: COLONOSCOPY Golytely;  Surgeon: Deya Harrington MD;  Location: [facility];  Service: Endoscopy;  Laterality: N/A;    GANGLION CYST EXCISION      TUBAL LIGATION      urethra widened     LSC BTL    Hysterectomy: No    Past Ob History     x 3.  C/s x 0.    Largest infant weight: 9#4oz.   yes FAVD. yes episiotomy.      Gynecologic History  LMP: No LMP recorded (lmp unknown). Patient is postmenopausal.  Age of menarche: 12 yo  Age of menopause: 38 yo  Menstrual history: h/o normal  Pap  test: 2017, normal/HPV neg.  History of abnormal paps: No.  History of STIs:  No  Mammogram: Date of last: 2020.  Result: Normal  Colonoscopy: Date of last: .  Result:  Polyps, benign.  Repeat due:  .    DEXA:  Date of last: .  Result:  normal.  Repeat due:  63 yo.     Family History  Family History   Problem Relation Age of Onset    Cancer Maternal Grandmother         breast    Breast cancer Maternal Grandmother     Kidney disease Neg Hx     Thyroid disease Neg Hx       Colon CA: No  Breast CA: Yes - MGM (80s)  GYN CA: No   CA: No    Social History  Social History     Tobacco Use   Smoking Status Former Smoker    Packs/day: 2.00    Types: Cigarettes    Last attempt to quit: 10/24/2008    Years since quittin.4   Smokeless Tobacco Never Used     Cessation date: .  PPD:  2 x 10 years.   Social History     Substance and Sexual Activity   Alcohol Use No    Frequency: Never    Binge frequency: Never   .    Social History     Substance and Sexual Activity   Drug Use No     The patient is in relationship.  Resides in [place] [address].  Employment status: unemployed, looking for job.     Allergies  Review of patient's allergies indicates:   Allergen Reactions    Bananas [banana]     Cantaloupe     Cucumbers [cucumber fruit extract]     [place]     Watermelon        Medications  Current Outpatient Medications on File Prior to Visit   Medication Sig Dispense Refill    varicella-zoster [id number], PF, (SHINGRIX, PF,) 50 mcg/0.5 mL injection Inject 0.5 mLs into the muscle As instructed. 2 doses (Patient not taking: Reported on 2020) 0.5 mL 2     No current facility-administered medications on file prior to visit.        Review of Systems A 14 point ROS was reviewed with pertinent positives as noted above in the history of present illness.      Constitutional: negative  Eyes: negative  Endocrine: negative  Gastrointestinal: negative  Cardiovascular: negative  Respiratory:  "negative  Allergic/Immunologic: negative  Integumentary: negative  Psychiatric: negative  Musculoskeletal: negative   Ear/Nose/Throat: negative  Neurologic: negative  Genitourinary: SEE HPI  Hematologic/Lymphatic: negative   Breast: negative    Urogynecologic Exam  BP (!) 148/79 (BP Location: Left arm, Patient Position: Sitting, BP Method: Medium (Automatic))   Ht 5' 5" (1.651 m)   Wt 75.1 kg (165 lb 9.1 oz)   LMP  (LMP Unknown)   BMI 27.55 kg/m²     GENERAL APPEARANCE:  The patient is well-developed, well-nourished.  Neck:  Supple with no thyromegaly, no carotid bruits.  Heart:  Regular rate and rhythm, no murmurs, rubs or gallops.  Lungs:  Clear.  No CVA tenderness.  Abdomen:  Soft, nontender, nondistended, no hepatosplenomegaly.  Incisions:  LSC well-healed    PELVIC:    External genitalia:  Normal Bartholins, Skenes and labia bilaterally.    Urethra:  No caruncle, diverticulum or masses.  (+) hypermobility.    Vagina:  Atrophy (--) , no bladder masses or tender, no discharge.    Cervix:  normal appearance; 1 cm polyp at transformation zone 10 o'clock--gently removed with ring forceps, submitted to path; based treated with AgNO3 after EMBx obtained  Uterus: normal size, contour, position, consistency, mobility, non-tender  Adnexa: Not palpable.    POP-Q:  Aa +3; Ba +5; C +7; Ap -1; Bp -1; D -4 to -5.  Genital hiatus 5, perineal body 2, total vaginal length 10-11.    NEUROLOGIC:  Cranial nerves 2 through 12 intact.  Strength 5/5.  DTRs 2+ lower extremities.  S2 through 4 normal.  Sacral reflexes intact.    EXT: CONDE, 2+ pulses bilaterally, no C/C/E    COUGH STRESS TEST:  negative  KEGEL: 1 /5 (some confusion with valsalva)    RECTAL:    External:  Normal, (--) hemorrhoids, (--) dovetailing.   Internal:  deferred    PVR: 40 mL    PROCEDURE (EMBx):  The patient was placed in dorsal lithotomy position.  A sterile speculum was used to identify the cervix. The ectocervix was prepped x 2 with betadine.  A single " tooth tenaculum was used to grasp the posterior cervix.  The endometrial biopsy pipelle was advanced into the uterine cavity until gentle resistance was met.  2 passes were made, and the specimen was submitted to pathology for further evaluation.  The tenaculum was removed, and the site remained hemostatic with gentle pressure from large Qtip.  The speculum was removed.  The patient tolerated the procedure well.     Impression    1. Postmenopausal bleeding    2. Cystocele with prolapse    3. Abnormal pelvic ultrasound    4. Rectocele, female    5. Uterovaginal prolapse    6. Cervical polyp    7. Urge urinary incontinence        Initial Plan  The patient was counseled regarding these issues. The patient was given a summary sheet containing each of these issues with possible options for evaluation and management. When appropriate, we also reviewed computer-generated diagrams specific to their diagnoses..  All questions were addressed to the patient's satisfaction.    1)  Stage 3 cystocele, uterine prolapse, stage 2 rectocele:  --discussed pathophysiology  --observation vs pessary vs surgery    --surgical option: robotic/laparoscopic hysterectomy, possible removal of tubes/ovaries, sacral colpopexy, possible sling   --pelvic US done   --if surgery: would need bladder testing to see if have hidden stress leakage and need sling   --if surgery: see PCP preop + labs (BMP, CBC, T&S)/EKG    2)  Postmenopausal spotting:  --may have been from cervical polyp: removed today/submitted to path  --pelvic US with thickened EMB; EMBx done today/submitted to path   --<1 cm smooth cyst on cervix likely Nabothian cyst/benign  --2017 pap normal/HPV neg  --treat vaginal dryness: Use REPLENS or REFRESH OTC: 1/2 applicator full in vagina twice a week.    --use water-based lube with intercourse    3) Urge incontinence/spontaneous incontinence, mild:  --urine C&S  --Empty bladder every 3 hours.  Empty well: wait a minute, lean forward on  toilet.    --Avoid dietary irritants (see sheet).  Keep diary x 3-5 days to determine your irritants.  --KEGELS: do 10 in AM and 10 in PM, holding each x 10 seconds.  When you feel urge to go, STOP, KEGEL, and when urge has passed, then go to bathroom.  Consider PT in future.    --URGE: consider medication in future.  Takes 2-4 weeks to see if will have effect.  For dry mouth: get sour, sugar free lozenge or gum.      4)  Will have NP will call you when we're able to start scheduling surgeries again.     Approximately 60 min were spent in consult, 90 % in discussion.     Thank you for requesting consultation of your patient.  I look forward to participating in their care.    Corey Riley  Female Pelvic Medicine and Reconstructive Surgery  [facility] [place], [place]

## 2020-03-25 NOTE — PATIENT INSTRUCTIONS
Bladder Irritants  Certain foods and drinks have been associated with worsening symptoms of urinary frequency, urgency, urge incontinence, or bladder pain. If you suffer from any of these conditions, you may wish to try eliminating one or more of these foods from your diet and see if your symptoms improve. If bladder symptoms are related to dietary factors, strict adherence to a diet thateliminates the food should bring marked relief in 10 days. Once you are feeling better, you can begin to add foods back into your diet, one at a time. If symptoms return, you will be able to identify the irritant. As you add foods back to your diet it is very important that you drink significant amounts of water.    -----------------------------------------------------------------------------------------------  List of Common Bladder Irritants*  Alcoholic beverages  Apples and apple juice  Cantaloupe  Carbonated beverages  Chili and spicy foods  Chocolate  Citrus fruit  Coffee (including decaffeinated)  Cranberries and cranberry juice  Grapes  Guava  Milk Products: milk, cheese, cottage cheese, yogurt, ice cream  Peaches  Pineapple  Plums  Strawberries  Sugar especially artificial sweeteners, saccharin, aspartame, corn sweeteners, honey, fructose, sucrose, lactose  Tea  Tomatoes and tomato juice  Vitamin B complex  Vinegar  *Most people are not sensitive to ALL of these products; your goal is to find the foods that make YOUR symptoms worse.  ---------------------------------------------------------------------------------------------------    Low-acid fruit substitutions include apricots, papaya, pears and watermelon. Coffee drinkers can drink Kava or other lowacid instant drinks. Tea drinkers can substitute non-citrus herbal and sun brewed teas. Calcium carbonate co-buffered with calcium ascorbate can be substituted for Vitamin C. Prelief is a dietary supplement that works as an acid blocker for the bladder.    Where to get more  information:        Overcoming Bladder Disorders by Jenelle Kemp and Stefan Garsia, 1990        You Dont Have to Live with Cystitis! By Malika Fernandez, 1988  · http://www.urologymanagement.org/oab    ------------------------------------------------------------    1)  Stage 3 cystocele, uterine prolapse, stage 2 rectocele:  --discussed pathophysiology  --observation vs pessary vs surgery    --surgical option: robotic/laparoscopic hysterectomy, possible removal of tubes/ovaries, sacral colpopexy, possible sling   --pelvic US done   --if surgery: would need bladder testing to see if have hidden stress leakage and need sling   --if surgery: see PCP preop + labs (BMP, CBC, T&S)/EKG    2)  Postmenopausal spotting:  --may have been from cervical polyp: removed today/submitted to path  --pelvic US with thickened EMB; EMBx done today/submitted to path   --<1 cm smooth cyst on cervix likely Nabothian cyst/benign  --2017 pap normal/HPV neg  --treat vaginal dryness: Use REPLENS or REFRESH OTC: 1/2 applicator full in vagina twice a week.    --use water-based lube with intercourse    3) Urge incontinence/spontaneous incontinence, mild:  --urine C&S  --Empty bladder every 3 hours.  Empty well: wait a minute, lean forward on toilet.    --Avoid dietary irritants (see sheet).  Keep diary x 3-5 days to determine your irritants.  --KEGELS: do 10 in AM and 10 in PM, holding each x 10 seconds.  When you feel urge to go, STOP, KEGEL, and when urge has passed, then go to bathroom.  Consider PT in future.    --URGE: consider medication in future.  Takes 2-4 weeks to see if will have effect.  For dry mouth: get sour, sugar free lozenge or gum.      4)  Will have NP will call you when we're able to start scheduling surgeries again.

## 2020-03-26 LAB — BACTERIA UR CULT: NO GROWTH

## 2020-03-27 LAB
FINAL PATHOLOGIC DIAGNOSIS: NORMAL
FINAL PATHOLOGIC DIAGNOSIS: NORMAL
GROSS: NORMAL
GROSS: NORMAL

## 2020-03-28 ENCOUNTER — PATIENT MESSAGE (OUTPATIENT)
Dept: UROGYNECOLOGY | Facility: CLINIC | Age: 60
End: 2020-03-28

## 2020-03-30 NOTE — TELEPHONE ENCOUNTER
Please advise,  I am still having some minor bleeding. I see it in the bowl after urinating. It seemed to stop yesterday but today its started again and I have seen what may have been some small clots. I feel ok. A little back pain not so bad. I figured its all normal but just checking.

## 2020-03-30 NOTE — TELEPHONE ENCOUNTER
Reviewed with patient that she can expect some spotting since the endocervical polyp was just removed. Verbalized understanding.  Mag Louis, FNP-BC

## 2020-04-21 ENCOUNTER — TELEPHONE (OUTPATIENT)
Dept: UROGYNECOLOGY | Facility: CLINIC | Age: 60
End: 2020-04-21

## 2020-04-21 NOTE — TELEPHONE ENCOUNTER
Attempted to reach patient regarding scheduling surgery. VM left to call office. Mag Louis, NANCYP-BC

## 2020-04-24 ENCOUNTER — PATIENT MESSAGE (OUTPATIENT)
Dept: UROGYNECOLOGY | Facility: CLINIC | Age: 60
End: 2020-04-24

## 2020-04-27 DIAGNOSIS — N39.41 URGE URINARY INCONTINENCE: ICD-10-CM

## 2020-04-27 DIAGNOSIS — N81.4 CYSTOCELE WITH PROLAPSE: Primary | ICD-10-CM

## 2020-06-08 ENCOUNTER — PATIENT MESSAGE (OUTPATIENT)
Dept: UROGYNECOLOGY | Facility: CLINIC | Age: 60
End: 2020-06-08

## 2020-06-10 ENCOUNTER — PROCEDURE VISIT (OUTPATIENT)
Dept: UROGYNECOLOGY | Facility: CLINIC | Age: 60
End: 2020-06-10
Payer: COMMERCIAL

## 2020-06-10 ENCOUNTER — OFFICE VISIT (OUTPATIENT)
Dept: UROGYNECOLOGY | Facility: CLINIC | Age: 60
End: 2020-06-10
Payer: COMMERCIAL

## 2020-06-10 VITALS
HEIGHT: 64 IN | WEIGHT: 169.75 LBS | SYSTOLIC BLOOD PRESSURE: 120 MMHG | BODY MASS INDEX: 28.98 KG/M2 | DIASTOLIC BLOOD PRESSURE: 88 MMHG

## 2020-06-10 VITALS
DIASTOLIC BLOOD PRESSURE: 88 MMHG | BODY MASS INDEX: 28.98 KG/M2 | HEIGHT: 64 IN | WEIGHT: 169.75 LBS | SYSTOLIC BLOOD PRESSURE: 120 MMHG

## 2020-06-10 DIAGNOSIS — N39.41 URGE URINARY INCONTINENCE: Primary | ICD-10-CM

## 2020-06-10 DIAGNOSIS — Z01.818 PREOP TESTING: Primary | ICD-10-CM

## 2020-06-10 DIAGNOSIS — N95.0 POSTMENOPAUSAL BLEEDING: Primary | ICD-10-CM

## 2020-06-10 DIAGNOSIS — N81.4 CYSTOCELE WITH PROLAPSE: ICD-10-CM

## 2020-06-10 DIAGNOSIS — N39.46 MIXED STRESS AND URGE URINARY INCONTINENCE: ICD-10-CM

## 2020-06-10 DIAGNOSIS — R93.89 ABNORMAL PELVIC ULTRASOUND: ICD-10-CM

## 2020-06-10 DIAGNOSIS — N81.4 UTEROVAGINAL PROLAPSE: ICD-10-CM

## 2020-06-10 DIAGNOSIS — N81.6 RECTOCELE, FEMALE: ICD-10-CM

## 2020-06-10 DIAGNOSIS — N95.0 POSTMENOPAUSAL BLEEDING: ICD-10-CM

## 2020-06-10 DIAGNOSIS — Z01.818 PREOPERATIVE TESTING: Primary | ICD-10-CM

## 2020-06-10 DIAGNOSIS — N39.46 MIXED INCONTINENCE URGE AND STRESS: ICD-10-CM

## 2020-06-10 DIAGNOSIS — Z01.818 PREOP TESTING: ICD-10-CM

## 2020-06-10 LAB
BILIRUB SERPL-MCNC: NORMAL MG/DL
BLOOD URINE, POC: NORMAL
CLARITY, POC UA: NORMAL
COLOR, POC UA: YELLOW
GLUCOSE UR QL STRIP: NORMAL
KETONES UR QL STRIP: NORMAL
LEUKOCYTE ESTERASE URINE, POC: NORMAL
NITRITE, POC UA: NORMAL
PH, POC UA: 6
PROTEIN, POC: NORMAL
SPECIFIC GRAVITY, POC UA: 1
UROBILINOGEN, POC UA: NORMAL

## 2020-06-10 PROCEDURE — 81002 URINALYSIS NONAUTO W/O SCOPE: CPT | Mod: S$GLB,,, | Performed by: OBSTETRICS & GYNECOLOGY

## 2020-06-10 PROCEDURE — 99499 UNLISTED E&M SERVICE: CPT | Mod: S$GLB,,, | Performed by: NURSE PRACTITIONER

## 2020-06-10 PROCEDURE — 99999 PR PBB SHADOW E&M-EST. PATIENT-LVL III: ICD-10-PCS | Mod: PBBFAC,,, | Performed by: NURSE PRACTITIONER

## 2020-06-10 PROCEDURE — 52000 PR CYSTOURETHROSCOPY: ICD-10-PCS | Mod: 59,S$GLB,, | Performed by: OBSTETRICS & GYNECOLOGY

## 2020-06-10 PROCEDURE — 99999 PR PBB SHADOW E&M-EST. PATIENT-LVL III: CPT | Mod: PBBFAC,,, | Performed by: NURSE PRACTITIONER

## 2020-06-10 PROCEDURE — 51797 INTRAABDOMINAL PRESSURE TEST: CPT | Mod: S$GLB,,, | Performed by: OBSTETRICS & GYNECOLOGY

## 2020-06-10 PROCEDURE — 51784 PR ANAL/URINARY MUSCLE STUDY: ICD-10-PCS | Mod: 51,S$GLB,, | Performed by: OBSTETRICS & GYNECOLOGY

## 2020-06-10 PROCEDURE — 51728 PR COMPLEX CYSTOMETROGRAM VOIDING PRESSURE STUDIES: ICD-10-PCS | Mod: S$GLB,,, | Performed by: OBSTETRICS & GYNECOLOGY

## 2020-06-10 PROCEDURE — 81002 POCT URINE DIPSTICK WITHOUT MICROSCOPE: ICD-10-PCS | Mod: S$GLB,,, | Performed by: OBSTETRICS & GYNECOLOGY

## 2020-06-10 PROCEDURE — 51741 ELECTRO-UROFLOWMETRY FIRST: CPT | Mod: 51,S$GLB,, | Performed by: OBSTETRICS & GYNECOLOGY

## 2020-06-10 PROCEDURE — 51741 PR UROFLOWMETRY, COMPLEX: ICD-10-PCS | Mod: 51,S$GLB,, | Performed by: OBSTETRICS & GYNECOLOGY

## 2020-06-10 PROCEDURE — 99499 NO LOS: ICD-10-PCS | Mod: S$GLB,,, | Performed by: NURSE PRACTITIONER

## 2020-06-10 PROCEDURE — 51728 CYSTOMETROGRAM W/VP: CPT | Mod: S$GLB,,, | Performed by: OBSTETRICS & GYNECOLOGY

## 2020-06-10 PROCEDURE — 52000 CYSTOURETHROSCOPY: CPT | Mod: 59,S$GLB,, | Performed by: OBSTETRICS & GYNECOLOGY

## 2020-06-10 PROCEDURE — 51797 PR VOIDING PRESS STUDY INTRA-ABDOMINAL VOID: ICD-10-PCS | Mod: S$GLB,,, | Performed by: OBSTETRICS & GYNECOLOGY

## 2020-06-10 PROCEDURE — 51784 ANAL/URINARY MUSCLE STUDY: CPT | Mod: 51,S$GLB,, | Performed by: OBSTETRICS & GYNECOLOGY

## 2020-06-10 RX ORDER — SULFAMETHOXAZOLE AND TRIMETHOPRIM 800; 160 MG/1; MG/1
1 TABLET ORAL
Status: COMPLETED | OUTPATIENT
Start: 2020-06-10 | End: 2020-06-10

## 2020-06-10 RX ORDER — LIDOCAINE HYDROCHLORIDE 20 MG/ML
JELLY TOPICAL
Status: COMPLETED | OUTPATIENT
Start: 2020-06-10 | End: 2020-06-10

## 2020-06-10 RX ADMIN — SULFAMETHOXAZOLE AND TRIMETHOPRIM 1 TABLET: 800; 160 TABLET ORAL at 09:06

## 2020-06-10 RX ADMIN — LIDOCAINE HYDROCHLORIDE 5 ML: 20 JELLY TOPICAL at 09:06

## 2020-06-10 NOTE — PROGRESS NOTES
Urogyn follow up  06/10/2020  .  [facility] UROGYNECOLOGY-[id number]   [address] [address]    Mari Ambrocio  [id number]  1960      Mari Ambrocio is a 59 y.o.  here for  preop visit.     Last HPI from 03/22/2020     1)       Ohs Peq [id number]      Question 3/21/2020 10:43 AM CDT - Filed by Patient on 3/21/2020   Do you...     Usually experience pressure in the lower abdomen? Symptoms not present   Usually experience heaviness or dullness in the pelvic area? Symptoms not present   Usually have a bulge or something falling out that you can see or feel in your vaginal area? Symptoms present and they bother me somewhat; present slightly past introitus; mostly noted when wiping; no major bother symptoms--has been doing Kegels a lot, which is helping   Ever have to push on the vagina or around the rectum to complete a bowel movement? Symptoms not present   Usually experience a feeling of incomplete bladder emptying? Symptoms not present   Ever have to push up on a bulge in the vaginal area with your fingers to start or complete urination? Symptoms not present   Do you...     Feel you need to strain too hard to have a bowel movement? Symptoms not present   Feel you have not completely emptied your bowels at the end of a bowel movement?  Symptoms not present   Usually lose stool beyond your control if your stool is well formed? Symptoms not present   Usually lose stool beyond your control if your stool is loose? Symptoms not present   Usually lose gas from your rectum beyond your control? Symptoms not present   Usually have pain when you pass your stool? Symptoms not present   Experience a strong sense of urgency and have to rush to the bathroom to have a bowel movement? Symptoms not present   Does part of your bowel ever pass through the rectum and bulge outside during or after a bowel movement? Symptoms not present   Do you...      Usually experience frequent urination? Symptoms not  present   Usually experience urine leakage associated with a feeling of urgency, that is, a strong sensation of needing to go to the bathroom? Symptoms not present   Usually experience urine leakage related to coughing, sneezing or laughing? Symptoms not present   Usually experience small amounts of urine leakage (that is, drops)? Symptoms present but they do not bother me at all   Usually experience difficulty emptying your bladder? Symptoms present but they do not bother me at all; PV to help   Usually experience pain or discomfort in the lower abdomen or genital region? Symptoms not present   POPDI  (range: 0 - 100) 8.33   CRADI (range: 0 - 100) 0   KEYANA (range: 0 - 100) 8.33   TOTAL SCORE  (range: 0 - 300) 16.66   Ohs Peq Urogyn Hpi      Question 3/21/2020 10:56 AM CDT - Filed by Patient on 3/21/2020   General Urogynecology: Are you experiencing the following?     Dysuria (painful urination) No   Nocturia:  waking up at night to empty your bladder  Yes   If you answered yes to the previous question, how many times does this happen per night? 1   Enuresis (urine loss during sleep) No   Dribbling urine after you urinate No   Hematuria (urine appears red) Yes   Type of stream Strong   Urinary frequency: How often a day are you going to the bathroom per day?  Less than 10; Q4h   Urinary Tract Infections: How many Urinary Tract Infections have you had in the past year? I have not had a UTI in the past year   If you have had a UTI in the past year, what treatments have you had so far?  I have not had a UTI in the past year   Urinary Incontinence (General): Are you experiencing the following?     Past consultation for incontinence: Have you ever seen someone for the evaluation of incontinence? No   If you answered yes to the previous question, please select all the therapies you have tried.  N/a- I answered no to the previous question   Please note the effectiveness of the therapies.     Need to wear protection to keep  "clothes dry  No   If you answered yes to the previous question, please mathew the protection you use.  None   If you wear protection, how much wetness is typically on each pad? N/a- I do not wear protection   If you wear protection, how often do you have to change per day, if applicable?  0   Stress Symptoms: Are you experiencing the following?     Leakage of urine with cough, laugh and/or sneeze No   If you answered yes to the previous question, what is the frequency in days, weeks and/or months? Never   Leakage of urine with sex No   Leakage of urine with bending/ lifting No   Leakage of urine with briskly walking or jogging No   If you lose urine for any other reason not previously mentioned, please note it below, if applicable.      Urge Symptoms: Are you experiencing the following?     Urgency ("got to go" feeling) No   Urge: How frequently do you feel an urge to urinate (feeling like you "gotta go" to the bathroom and can't wait) Never   Do you experience a leakage of urine when you have a feeling of urgency?  No   Leakage of urine when unaware No   Past use of anticholinergics (medications used to treat overactive bladder) No   If you answered yes to the previous question, please mathew the anticholinergics you have used:      Have you ever used Mirbetriq (aka Mirabegron)?  No   Prolapse Symptoms: Are you experiencing any of the following?      Falling out/ Bulging/ Heaviness in the vagina Yes   Vaginal/ Abdominal Pain/ Pressure No   Need to strain/ Push to void No   Need to wait on the toilet before you void Yes   Unusual position to urinate (using your hands to push back the vaginal bulge) Yes   Sensation of incomplete emptying No   Past use of pessary device No   If you answered yes to the previous question, please list the devices you have used below.      Bowel Symptoms: Are you experiencing any of the following?     Constipation No   Diarrhea  No   Hematochezia (bloody stool) No   Incomplete evacuation of " stool No   Involuntary loss of formed stool No   Fecal smearing/urgency No   Involuntary loss of gas No   Vaginal Symptoms: Are you experiencing any of the following?      Abnormal vaginal bleeding  Yes: see below   Vaginal dryness No   Sexually active  Yes   Dyspareunia (painful intercourse) No   Estrogen use  No   Ohs Peq Pelvic Pain Urogyn      Question 3/21/2020 10:56 AM CDT - Filed by Patient on 3/21/2020   Are you experiencing pelvic pain?  No      2)   bleeding/spotting:  --x 1 in 12/2019; mild spotting; pre-coital spotting x 1  --told PCP  --pelvic US 2/21/2020:  Uterus:  Size: 6.8 x 2.9 x 5.1 cm  Masses: There is a small uterine fibroid, intramural, less than 1 cm.  Endometrium: Thickened in this post menopausal patient, measuring 6 mm.  There is a an oval isoechoic mass within the cervix measuring 0.6 x 0.6 x 0.7 cm with smooth contour.  The bilateral ovaries were not seen.  The bilateral adnexa appear normal.  Free Fluid:  Trace of free fluid with septation.    03/25/2020  embx  SMALL FRAGMENTS OF INACTIVE ENDOMETRIUM AND BLOOD CLOT  Cervical polyp  BENIGN INFLAMED ENDOCERVICAL POLYP     Suds  06/10/2020     Urine dipstick: neg.     1.  VOIDING PHASE:       a.  Uroflowmetry:  · Prolapse reduction: No  · Voided volume:  578 mL   · Voiding time:   43 seconds  · Max flow:  42 mL/s  · Avg flow:   13 mL/s   · PVR:   50 mL     The overall configuration of this uroflow study was normal.       b.  Pressure flow:  · Prolapse reduction: No  · Voided volume:   628 mL  · Voiding time:  70 seconds  · Peak flow:  49 mL/s   · Avg flow:  11 mL/s  · Max det pressure:  62  cm H20  · Det pressure at max flow: 22 cm H20  · Void initiated by detrusor contraction.    · Urethral relaxation (EMG):  absent.    · PVR (calculated):  0 mL     The overall configuration of this pressure flow study was prolonged but normal.       2.  FILLING PHASE:  · 1st desire: 96 mL  · Normal desire:  257 mL  · Strong desire:  296 mL  · Urgency:   413 mL  · Compliance (calculated)  approx 150 mL/cm H20  · EMG activity during filling:  stable  · Detrusor contractions observed: No.       3.  URETHRAL FUNCTION/STORAGE PHASE:     a.  WITH prolapse reduction:  · CLPP (150 mL): Negative  at  169 cm H20  · VLPP (150 mL): Negative  at  111 cm H20  · CLPP (300 mL): Negative  at  167 cm H20  · VLPP (300 mL): Negative  at  89 cm H20   · CLPP (MAX ):    Negative  at  147 cm H20  · VLPP (MAX):     Negative  at  89 cm H20  · POS CLPP at max cap once pves catheter removed.      These findings are consistent with Positive urodynamic stress incontinence with cough at max cap once pves catheter removed.     Assessment:  UF normal.  PF prolonged but normal.  Compliance normal.  Max capacity 628 mL.  DO (--).  PROMISE (+).        Cysto  Assessment: Essentially normal cystourethroscopy with mild decrease in urethral coaptation.     Plan: The patient will follow up with Dr. Riley as scheduled.  See urodynamics note from 6/10/2020 for further plan details.       Past Medical History:   Diagnosis Date    Ganglion cyst     History of shingles     Hyperlipidemia     Kidney stone     Otitis media     Sciatica     Urinary tract infection        Past Surgical History:   Procedure Laterality Date    COLONOSCOPY N/A 4/11/2018    Procedure: COLONOSCOPY Golytely;  Surgeon: Deya Harrington MD;  [facility];  Service: Endoscopy;  Laterality: N/A;    GANGLION CYST EXCISION      TUBAL LIGATION  1998    urethra widened         Family History   Problem Relation Age of Onset    Cancer Maternal Grandmother         breast    Breast cancer Maternal Grandmother     Kidney disease Neg Hx     Thyroid disease Neg Hx        Social History     Socioeconomic History    Marital status:      Spouse name: Not on file    Number of children: Not on file    Years of education: Not on file    Highest education level: Not on file   Occupational History    Not on file   Social  Needs    Financial resource strain: Not very hard    Food insecurity     Worry: Never true     Inability: Never true    Transportation needs     Medical: No     Non-medical: No   Tobacco Use    Smoking status: Former Smoker     Packs/day: 2.00     Types: Cigarettes     Quit date: 10/24/2008     Years since quittin.6    Smokeless tobacco: Never Used   Substance and Sexual Activity    Alcohol use: No     Frequency: Never     Binge frequency: Never    Drug use: No    Sexual activity: Yes     Partners: Male   Lifestyle    Physical activity     Days per week: 7 days     Minutes per session: 30 min    Stress: Not at all   Relationships    Social connections     Talks on phone: More than three times a week     Gets together: More than three times a week     Attends Orthodoxy service: Not on file     Active member of club or organization: Yes     Attends meetings of clubs or organizations: More than 4 times per year     Relationship status:    Other Topics Concern    Not on file   Social History Narrative    Not on file       Current Outpatient Medications   Medication Sig Dispense Refill    varicella-zoster [id number], PF, (SHINGRIX, PF,) 50 mcg/0.5 mL injection Inject 0.5 mLs into the muscle As instructed. 2 doses (Patient not taking: Reported on 2020) 0.5 mL 2     No current facility-administered medications for this visit.        Review of patient's allergies indicates:   Allergen Reactions    Bananas [banana]     Cantaloupe     Cucumbers [cucumber fruit extract]     [place]     Watermelon        Well woman:  Pap test: 2017, normal/HPV neg.  History of abnormal paps: No.  History of STIs:  No  Mammogram: Date of last: 2020.  Result: Normal  Colonoscopy: Date of last: .  Result:  Polyps, benign.  Repeat due:  .    DEXA:  Date of last: .  Result:  normal.  Repeat due:  65 yo.     ROS:  As per HPI.      Exam  /88 (BP Location: Right arm, Patient Position: Sitting, BP  "Method: Medium (Manual))   Ht 5' 4" (1.626 m)   Wt 77 kg (169 lb 12.1 oz)   LMP  (LMP Unknown)   BMI 29.14 kg/m²   General: alert and oriented, no acute distress  Respiratory: normal respiratory effort  Abd: soft, non-tender, non-distended    Pelvic--deferred    Impression  1. Preop testing  COVID-19 Routine Screening   2. Cystocele with prolapse     3. Uterovaginal prolapse     4. Postmenopausal bleeding     5. Rectocele, female     6. Mixed stress and urge urinary incontinence     7. Abnormal pelvic ultrasound       We reviewed the above issues and discussed options for short-term versus long-term management of her problems.   Plan:   1. Patient consented with Dr. Whitfield for robotic assisted laparoscopic hysterectomy/sacrocolpopexy with synthetic mesh, possible anterior/posterior repair with perineorrhaphy, removal of tubes and ovaries, possible laparotomy, placement of synthetic midurethral sling, and cystourethroscopy.   R/B/A reviewed. Specific risks reviewed include:  infection, bleeding, need for blood transfusion, damage to surrounding structures, anesthesia risks, death, heart attack, stroke, mesh erosion/extrusion, pain, dyspareunia, urinary retention, voiding dysfunction, urinary incontinence, exacerbation of urinary urge incontinence, and need for further surgeries.  We reviewed potential for failure of POP defect repair and need for future surgery, with no way of predicting risk.  She understands success rate of ASC approaches 85%.  Success rate of midurethral sling for PROMISE was reviewed as 80-85%, and she understands that this will not necessarily impact other types of urinary incontinence.  Alternatives reviewed include: pessary/PT for POP and pessary/periurethral injections/PT/medication for PROMISE.    2. ?BSO: GM  from BRCA in 80s.  Ok with removing tubes/ovaries. Menopause at 38 yo.  Understands if has bridger symptoms, artemio vaginal dryness, we can treat those.   3. T&S, urine HCG on " DOS  4. Preoperative appointment with PCP or cardiology: Yes - scheduled with Ger Lim on 06/17  5. VTE Prophylaxis:  heparin 5000 u SQ TID (1st dose 2hrs preop) + SCDs  6. Patient instructed on Magnesium citrate and chlorahexadine/dial soap prep to perform day before & AM of surgery.   7. Proceed to OR for above-mentioned procedure.  8. DR. WEEKS ([facility]) GAVE OK FOR RAPID TESTING DAY OF SURGERY DUE TO 4TH OF JULY HOLIDAY CLOSURES        30 minutes were spent in face to face time with this patient  90 % of this time was spent in counseling and/or coordination of care    TARYN Lopez-BC [facility]  Division of Female Pelvic Medicine and Reconstructive Surgery  Department of Obstetrics & Gynecology

## 2020-06-10 NOTE — PROGRESS NOTES
TITLE OF OPERATION:  1. Complex cystometry.  2. Complex uroflowmetry.  3. Electromyography with surface electrodes.  4. Pressure voiding flow study.  5. Abdominal pressure measurement.  6. Leak point pressure measurement.    INDICATIONS:  1)       Ohs Peq [id number]      Question 3/21/2020 10:43 AM CDT - Filed by Patient on 3/21/2020   Do you...     Usually experience pressure in the lower abdomen? Symptoms not present   Usually experience heaviness or dullness in the pelvic area? Symptoms not present   Usually have a bulge or something falling out that you can see or feel in your vaginal area? Symptoms present and they bother me somewhat; present slightly past introitus; mostly noted when wiping; no major bother symptoms--has been doing Kegels a lot, which is helping   Ever have to push on the vagina or around the rectum to complete a bowel movement? Symptoms not present   Usually experience a feeling of incomplete bladder emptying? Symptoms not present   Ever have to push up on a bulge in the vaginal area with your fingers to start or complete urination? Symptoms not present   Do you...     Feel you need to strain too hard to have a bowel movement? Symptoms not present   Feel you have not completely emptied your bowels at the end of a bowel movement?  Symptoms not present   Usually lose stool beyond your control if your stool is well formed? Symptoms not present   Usually lose stool beyond your control if your stool is loose? Symptoms not present   Usually lose gas from your rectum beyond your control? Symptoms not present   Usually have pain when you pass your stool? Symptoms not present   Experience a strong sense of urgency and have to rush to the bathroom to have a bowel movement? Symptoms not present   Does part of your bowel ever pass through the rectum and bulge outside during or after a bowel movement? Symptoms not present   Do you...      Usually experience frequent urination? Symptoms not present   Usually  experience urine leakage associated with a feeling of urgency, that is, a strong sensation of needing to go to the bathroom? Symptoms not present   Usually experience urine leakage related to coughing, sneezing or laughing? Symptoms not present   Usually experience small amounts of urine leakage (that is, drops)? Symptoms present but they do not bother me at all   Usually experience difficulty emptying your bladder? Symptoms present but they do not bother me at all; PV to help   Usually experience pain or discomfort in the lower abdomen or genital region? Symptoms not present   POPDI  (range: 0 - 100) 8.33   CRADI (range: 0 - 100) 0   KEYANA (range: 0 - 100) 8.33   TOTAL SCORE  (range: 0 - 300) 16.66   Ohs Peq Urogyn Hpi      Question 3/21/2020 10:56 AM CDT - Filed by Patient on 3/21/2020   General Urogynecology: Are you experiencing the following?     Dysuria (painful urination) No   Nocturia:  waking up at night to empty your bladder  Yes   If you answered yes to the previous question, how many times does this happen per night? 1   Enuresis (urine loss during sleep) No   Dribbling urine after you urinate No   Hematuria (urine appears red) Yes   Type of stream Strong   Urinary frequency: How often a day are you going to the bathroom per day?  Less than 10; Q4h   Urinary Tract Infections: How many Urinary Tract Infections have you had in the past year? I have not had a UTI in the past year   If you have had a UTI in the past year, what treatments have you had so far?  I have not had a UTI in the past year   Urinary Incontinence (General): Are you experiencing the following?     Past consultation for incontinence: Have you ever seen someone for the evaluation of incontinence? No   If you answered yes to the previous question, please select all the therapies you have tried.  N/a- I answered no to the previous question   Please note the effectiveness of the therapies.     Need to wear protection to keep clothes dry  No  "  If you answered yes to the previous question, please mathew the protection you use.  None   If you wear protection, how much wetness is typically on each pad? N/a- I do not wear protection   If you wear protection, how often do you have to change per day, if applicable?  0   Stress Symptoms: Are you experiencing the following?     Leakage of urine with cough, laugh and/or sneeze No   If you answered yes to the previous question, what is the frequency in days, weeks and/or months? Never   Leakage of urine with sex No   Leakage of urine with bending/ lifting No   Leakage of urine with briskly walking or jogging No   If you lose urine for any other reason not previously mentioned, please note it below, if applicable.      Urge Symptoms: Are you experiencing the following?     Urgency ("got to go" feeling) No   Urge: How frequently do you feel an urge to urinate (feeling like you "gotta go" to the bathroom and can't wait) Never   Do you experience a leakage of urine when you have a feeling of urgency?  No   Leakage of urine when unaware No   Past use of anticholinergics (medications used to treat overactive bladder) No   If you answered yes to the previous question, please mathew the anticholinergics you have used:      Have you ever used Mirbetriq (aka Mirabegron)?  No   Prolapse Symptoms: Are you experiencing any of the following?      Falling out/ Bulging/ Heaviness in the vagina Yes   Vaginal/ Abdominal Pain/ Pressure No   Need to strain/ Push to void No   Need to wait on the toilet before you void Yes   Unusual position to urinate (using your hands to push back the vaginal bulge) Yes   Sensation of incomplete emptying No   Past use of pessary device No   If you answered yes to the previous question, please list the devices you have used below.      Bowel Symptoms: Are you experiencing any of the following?     Constipation No   Diarrhea  No   Hematochezia (bloody stool) No   Incomplete evacuation of stool No "   Involuntary loss of formed stool No   Fecal smearing/urgency No   Involuntary loss of gas No   Vaginal Symptoms: Are you experiencing any of the following?      Abnormal vaginal bleeding  Yes: see below   Vaginal dryness No   Sexually active  Yes   Dyspareunia (painful intercourse) No   Estrogen use  No   Ohs Peq Pelvic Pain Urogyn      Question 3/21/2020 10:56 AM CDT - Filed by Patient on 3/21/2020   Are you experiencing pelvic pain?  No      2)   bleeding/spotting:  --x 1 in 12/2019; mild spotting; pre-coital spotting x 1  --told PCP  --pelvic US 2/21/2020:  Uterus:  Size: 6.8 x 2.9 x 5.1 cm  Masses: There is a small uterine fibroid, intramural, less than 1 cm.  Endometrium: Thickened in this post menopausal patient, measuring 6 mm.  There is a an oval isoechoic mass within the cervix measuring 0.6 x 0.6 x 0.7 cm with smooth contour.  The bilateral ovaries were not seen.  The bilateral adnexa appear normal.  Free Fluid:  Trace of free fluid with septation.  --EMBx 3/2020: benign + cervical polyp    POP-Q:  Aa +3; Ba +5; C +7; Ap -1; Bp -1; D -4 to -5.  Genital hiatus 5, perineal body 2, total vaginal length 10-11.    PREOPERATIVE DIAGNOSIS:  1. Postmenopausal bleeding    2. Cystocele with prolapse    3. Abnormal pelvic ultrasound    4. Rectocele, female    5. Uterovaginal prolapse    6. Cervical polyp    7. Urge urinary incontinence      POSTOPERATIVE DIAGNOSIS:  1. Postmenopausal bleeding    2. Cystocele with prolapse    3. Abnormal pelvic ultrasound    4. Rectocele, female    5. Uterovaginal prolapse    6. Cervical polyp    7. Urge urinary incontinence    8.     Urodynamic stress incontinence  9.     Decreased urethral coaptation, mild    ANESTHESIA:  None.    SPECIMEN (BACTERIOLOGICAL, PATHOLOGICAL OR OTHER):  None.    PROSTHETIC DEVICE/IMPLANT:  None.    SURGEONS NARRATIVE:  A time out was performed in which the patient identity and procedure were confirmed.  Urodynamic evaluation was performed using a  computerized system ([employer]  Uroflowmetry was performed on the patient in the sitting position without catheters in place.  Subsequent urodynamic testing was performed with the patient in the lithotomy position at 45 degrees. Air charged catheters were used with sterile water as the infusion medium. Vesical and abdominal (rectal) pressures were measured, and detrusor pressure was calculated. EMG activity was recorded with surface electrodes. During filling, room temperature sterile water was infused at a rate of 30 cubic centimeters per minute. The patient was asked cough after instillation of each 100cc volume. Two Valsalva leak point pressures and two cough leak point pressures were performed with the catheters in place at 300 cubic centimeters and again at maximum capacity. Valsalva leak point pressure was defined as the difference between vesical pressure at which leakage was noted (visualized at the external urethral meatus) and the baseline vesical pressure. Following urodynamic testing, a pressure flow study was performed with the patient in the sitting position. Vesical and abdominal pressures were monitored and detrusor pressures were calculated. After the pressure flow study, the catheters were then removed. The patient tolerated the procedure well.     Urine dipstick: neg.    1.  VOIDING PHASE:      a.  Uroflowmetry:   Prolapse reduction: No   Voided volume:  578 mL    Voiding time:   43 seconds   Max flow:  42 mL/s   Avg flow:   13 mL/s    PVR:   50 mL    The overall configuration of this uroflow study was normal.      b.  Pressure flow:   Prolapse reduction: No   Voided volume:   628 mL   Voiding time:  70 seconds   Peak flow:  49 mL/s    Avg flow:  11 mL/s   Max det pressure:  62  cm H20   Det pressure at max flow: 22 cm H20   Void initiated by detrusor contraction.     Urethral relaxation (EMG):  absent.     PVR (calculated):  0 mL    The overall configuration of  this pressure flow study was prolonged but normal.      2.  FILLING PHASE:   1st desire: 96 mL   Normal desire:  257 mL   Strong desire:  296 mL   Urgency:  413 mL   Compliance (calculated)  approx 150 mL/cm H20   EMG activity during filling:  stable   Detrusor contractions observed: No.      3.  URETHRAL FUNCTION/STORAGE PHASE:    a.  WITH prolapse reduction:   CLPP (150 mL): Negative  at  169 cm H20   VLPP (150 mL): Negative  at  111 cm H20   CLPP (300 mL): Negative  at  167 cm H20   VLPP (300 mL): Negative  at  89 cm H20    CLPP (MAX ):    Negative  at  147 cm H20   VLPP (MAX):     Negative  at  89 cm H20   POS CLPP at max cap once pves catheter removed.     These findings are consistent with Positive urodynamic stress incontinence with cough at max cap once pves catheter removed.    Assessment:  UF normal.  PF prolonged but normal.  Compliance normal.  Max capacity 628 mL.  DO (--).  PROMISE (+).      Plan:  1)  Stage 3 cystocele, uterine prolapse, stage 2 rectocele:  --7/6/2020 scheduled:  robotic/laparoscopic hysterectomy, possible removal of tubes/ovaries, sacral colpopexy, possible sling              --pelvic US done/EMBx benign; previous pap benign              --if surgery: would need bladder testing to see if have hidden stress leakage and need sling              --if surgery: see PCP preop + labs (BMP, CBC, T&S)/EKG     2)  Postmenopausal spotting:  --pelvic US with thickened EMB              --<1 cm smooth cyst on cervix likely Nabothian cyst/benign   --EMBx 3/2020: benign + endocervical polyp  --2017 pap normal/HPV neg  --treat vaginal dryness: Use REPLENS or REFRESH OTC: 1/2 applicator full in vagina twice a week.    --use water-based lube with intercourse     3) Urge incontinence/spontaneous incontinence, mild:  --urine C&S  --Empty bladder every 3 hours.  Empty well: wait a minute, lean forward on toilet.    --Avoid dietary irritants (see sheet).  Keep diary x 3-5 days to determine your  irritants.  --KEGELS: do 10 in AM and 10 in PM, holding each x 10 seconds.  When you feel urge to go, STOP, KEGEL, and when urge has passed, then go to bathroom.  Consider PT in future.    --URGE: consider medication in future.  Takes 2-4 weeks to see if will have effect.  For dry mouth: get sour, sugar free lozenge or gum.    --------------------------------------------------------------    Title of Operation:   Cystourethroscopy.     INDICATIONS:  As above    PREOPERATIVE DIAGNOSIS  As above    POSTOPERATIVE DIAGNOSIS:   As above    Anesthesia:   2% Xylocaine gel.    Specimen (Bacteriological, Pathological or other):   None.     Prosthetic Device/Implant:   None.     Surgeons Narrative:     After informed consent was obtained, the patient was placed in the lithotomy position. The urethral meatus was prepped with Betadine and 10 cubic centimeters of 2% Xylocaine gel were introduced into the urethra. A flexible cystourethroscope was introduced into the bladder. The bladder was distended with approximately 300 cubic centimeters of sterile water. A systematic survey was performed in which the bladder was surveyed using multiple sequential passes in a clockwise fashion from the bladder dome to the bladder base to the urethrovesical junction. The trigone and ureteral orifices were observed. The scope was then flipped back on itself, and the urethrovesical junction was viewed. A vaginal examining finger was then placed with pressure suburethrally at the urethrovesical junction as the telescope was withdrawn in order to perform positive pressure urethroscopy.  Standard maneuvers of cough, squeeze and Valsalva were performed. The telescope was then completely withdrawn.     Findings: Urethroscopy:  Normal with mild decrease in coaptation.  Cystoscopy:  Normal bladder mucosa, bilateral ureteral flow was noted.     Assessment: Essentially normal cystourethroscopy with mild decrease in urethral coaptation.     Plan: The  patient will follow up with Dr. Riley as scheduled.  See urodynamics note from 6/10/2020 for further plan details.

## 2020-06-10 NOTE — H&P (VIEW-ONLY)
Urogyn follow up  06/10/2020  .  [facility] UROGYNECOLOGY-[id number]   [address] [id number]    Mari Ambrocio  [id number]  1960      Mari Ambrocio is a 59 y.o.  here for  preop visit.     Last HPI from 03/22/2020     1)       Ohs Peq [id number]      Question 3/21/2020 10:43 AM CDT - Filed by Patient on 3/21/2020   Do you...     Usually experience pressure in the lower abdomen? Symptoms not present   Usually experience heaviness or dullness in the pelvic area? Symptoms not present   Usually have a bulge or something falling out that you can see or feel in your vaginal area? Symptoms present and they bother me somewhat; present slightly past introitus; mostly noted when wiping; no major bother symptoms--has been doing Kegels a lot, which is helping   Ever have to push on the vagina or around the rectum to complete a bowel movement? Symptoms not present   Usually experience a feeling of incomplete bladder emptying? Symptoms not present   Ever have to push up on a bulge in the vaginal area with your fingers to start or complete urination? Symptoms not present   Do you...     Feel you need to strain too hard to have a bowel movement? Symptoms not present   Feel you have not completely emptied your bowels at the end of a bowel movement?  Symptoms not present   Usually lose stool beyond your control if your stool is well formed? Symptoms not present   Usually lose stool beyond your control if your stool is loose? Symptoms not present   Usually lose gas from your rectum beyond your control? Symptoms not present   Usually have pain when you pass your stool? Symptoms not present   Experience a strong sense of urgency and have to rush to the bathroom to have a bowel movement? Symptoms not present   Does part of your bowel ever pass through the rectum and bulge outside during or after a bowel movement? Symptoms not present   Do you...      Usually experience frequent urination? Symptoms not  present   Usually experience urine leakage associated with a feeling of urgency, that is, a strong sensation of needing to go to the bathroom? Symptoms not present   Usually experience urine leakage related to coughing, sneezing or laughing? Symptoms not present   Usually experience small amounts of urine leakage (that is, drops)? Symptoms present but they do not bother me at all   Usually experience difficulty emptying your bladder? Symptoms present but they do not bother me at all; PV to help   Usually experience pain or discomfort in the lower abdomen or genital region? Symptoms not present   POPDI  (range: 0 - 100) 8.33   CRADI (range: 0 - 100) 0   KEYANA (range: 0 - 100) 8.33   TOTAL SCORE  (range: 0 - 300) 16.66   Ohs Peq Urogyn Hpi      Question 3/21/2020 10:56 AM CDT - Filed by Patient on 3/21/2020   General Urogynecology: Are you experiencing the following?     Dysuria (painful urination) No   Nocturia:  waking up at night to empty your bladder  Yes   If you answered yes to the previous question, how many times does this happen per night? 1   Enuresis (urine loss during sleep) No   Dribbling urine after you urinate No   Hematuria (urine appears red) Yes   Type of stream Strong   Urinary frequency: How often a day are you going to the bathroom per day?  Less than 10; Q4h   Urinary Tract Infections: How many Urinary Tract Infections have you had in the past year? I have not had a UTI in the past year   If you have had a UTI in the past year, what treatments have you had so far?  I have not had a UTI in the past year   Urinary Incontinence (General): Are you experiencing the following?     Past consultation for incontinence: Have you ever seen someone for the evaluation of incontinence? No   If you answered yes to the previous question, please select all the therapies you have tried.  N/a- I answered no to the previous question   Please note the effectiveness of the therapies.     Need to wear protection to keep  "clothes dry  No   If you answered yes to the previous question, please mathew the protection you use.  None   If you wear protection, how much wetness is typically on each pad? N/a- I do not wear protection   If you wear protection, how often do you have to change per day, if applicable?  0   Stress Symptoms: Are you experiencing the following?     Leakage of urine with cough, laugh and/or sneeze No   If you answered yes to the previous question, what is the frequency in days, weeks and/or months? Never   Leakage of urine with sex No   Leakage of urine with bending/ lifting No   Leakage of urine with briskly walking or jogging No   If you lose urine for any other reason not previously mentioned, please note it below, if applicable.      Urge Symptoms: Are you experiencing the following?     Urgency ("got to go" feeling) No   Urge: How frequently do you feel an urge to urinate (feeling like you "gotta go" to the bathroom and can't wait) Never   Do you experience a leakage of urine when you have a feeling of urgency?  No   Leakage of urine when unaware No   Past use of anticholinergics (medications used to treat overactive bladder) No   If you answered yes to the previous question, please mathew the anticholinergics you have used:      Have you ever used Mirbetriq (aka Mirabegron)?  No   Prolapse Symptoms: Are you experiencing any of the following?      Falling out/ Bulging/ Heaviness in the vagina Yes   Vaginal/ Abdominal Pain/ Pressure No   Need to strain/ Push to void No   Need to wait on the toilet before you void Yes   Unusual position to urinate (using your hands to push back the vaginal bulge) Yes   Sensation of incomplete emptying No   Past use of pessary device No   If you answered yes to the previous question, please list the devices you have used below.      Bowel Symptoms: Are you experiencing any of the following?     Constipation No   Diarrhea  No   Hematochezia (bloody stool) No   Incomplete evacuation of " stool No   Involuntary loss of formed stool No   Fecal smearing/urgency No   Involuntary loss of gas No   Vaginal Symptoms: Are you experiencing any of the following?      Abnormal vaginal bleeding  Yes: see below   Vaginal dryness No   Sexually active  Yes   Dyspareunia (painful intercourse) No   Estrogen use  No   Ohs Peq Pelvic Pain Urogyn      Question 3/21/2020 10:56 AM CDT - Filed by Patient on 3/21/2020   Are you experiencing pelvic pain?  No      2)   bleeding/spotting:  --x 1 in 12/2019; mild spotting; pre-coital spotting x 1  --told PCP  --pelvic US 2/21/2020:  Uterus:  Size: 6.8 x 2.9 x 5.1 cm  Masses: There is a small uterine fibroid, intramural, less than 1 cm.  Endometrium: Thickened in this post menopausal patient, measuring 6 mm.  There is a an oval isoechoic mass within the cervix measuring 0.6 x 0.6 x 0.7 cm with smooth contour.  The bilateral ovaries were not seen.  The bilateral adnexa appear normal.  Free Fluid:  Trace of free fluid with septation.    03/25/2020  embx  SMALL FRAGMENTS OF INACTIVE ENDOMETRIUM AND BLOOD CLOT  Cervical polyp  BENIGN INFLAMED ENDOCERVICAL POLYP     Suds  06/10/2020     Urine dipstick: neg.     1.  VOIDING PHASE:       a.  Uroflowmetry:  · Prolapse reduction: No  · Voided volume:  578 mL   · Voiding time:   43 seconds  · Max flow:  42 mL/s  · Avg flow:   13 mL/s   · PVR:   50 mL     The overall configuration of this uroflow study was normal.       b.  Pressure flow:  · Prolapse reduction: No  · Voided volume:   628 mL  · Voiding time:  70 seconds  · Peak flow:  49 mL/s   · Avg flow:  11 mL/s  · Max det pressure:  62  cm H20  · Det pressure at max flow: 22 cm H20  · Void initiated by detrusor contraction.    · Urethral relaxation (EMG):  absent.    · PVR (calculated):  0 mL     The overall configuration of this pressure flow study was prolonged but normal.       2.  FILLING PHASE:  · 1st desire: 96 mL  · Normal desire:  257 mL  · Strong desire:  296 mL  · Urgency:   413 mL  · Compliance (calculated)  approx 150 mL/cm H20  · EMG activity during filling:  stable  · Detrusor contractions observed: No.       3.  URETHRAL FUNCTION/STORAGE PHASE:     a.  WITH prolapse reduction:  · CLPP (150 mL): Negative  at  169 cm H20  · VLPP (150 mL): Negative  at  111 cm H20  · CLPP (300 mL): Negative  at  167 cm H20  · VLPP (300 mL): Negative  at  89 cm H20   · CLPP (MAX ):    Negative  at  147 cm H20  · VLPP (MAX):     Negative  at  89 cm H20  · POS CLPP at max cap once pves catheter removed.      These findings are consistent with Positive urodynamic stress incontinence with cough at max cap once pves catheter removed.     Assessment:  UF normal.  PF prolonged but normal.  Compliance normal.  Max capacity 628 mL.  DO (--).  PROMISE (+).        Cysto  Assessment: Essentially normal cystourethroscopy with mild decrease in urethral coaptation.     Plan: The patient will follow up with Dr. Riley as scheduled.  See urodynamics note from 6/10/2020 for further plan details.       Past Medical History:   Diagnosis Date    Ganglion cyst     History of shingles     Hyperlipidemia     Kidney stone     Otitis media     Sciatica     Urinary tract infection        Past Surgical History:   Procedure Laterality Date    COLONOSCOPY N/A 4/11/2018    Procedure: COLONOSCOPY Golytely;  Surgeon: Deya Harrington MD;  Location: [facility];  Service: Endoscopy;  Laterality: N/A;    GANGLION CYST EXCISION      TUBAL LIGATION  1998    urethra widened         Family History   Problem Relation Age of Onset    Cancer Maternal Grandmother         breast    Breast cancer Maternal Grandmother     Kidney disease Neg Hx     Thyroid disease Neg Hx        Social History     Socioeconomic History    Marital status:      Spouse name: Not on file    Number of children: Not on file    Years of education: Not on file    Highest education level: Not on file   Occupational History    Not on file   Social  Needs    Financial resource strain: Not very hard    Food insecurity     Worry: Never true     Inability: Never true    Transportation needs     Medical: No     Non-medical: No   Tobacco Use    Smoking status: Former Smoker     Packs/day: 2.00     Types: Cigarettes     Quit date: 10/24/2008     Years since quittin.6    Smokeless tobacco: Never Used   Substance and Sexual Activity    Alcohol use: No     Frequency: Never     Binge frequency: Never    Drug use: No    Sexual activity: Yes     Partners: Male   Lifestyle    Physical activity     Days per week: 7 days     Minutes per session: 30 min    Stress: Not at all   Relationships    Social connections     Talks on phone: More than three times a week     Gets together: More than three times a week     Attends Worship service: Not on file     Active member of club or organization: Yes     Attends meetings of clubs or organizations: More than 4 times per year     Relationship status:    Other Topics Concern    Not on file   Social History Narrative    Not on file       Current Outpatient Medications   Medication Sig Dispense Refill    varicella-zoster gE-AS01B, PF, (SHINGRIX, PF,) 50 mcg/0.5 mL injection Inject 0.5 mLs into the muscle As instructed. 2 doses (Patient not taking: Reported on 2020) 0.5 mL 2     No current facility-administered medications for this visit.        Review of patient's allergies indicates:   Allergen Reactions    Bananas [banana]     Cantaloupe     Cucumbers [cucumber fruit extract]     [place]     Watermelon        Well woman:  Pap test: 2017, normal/HPV neg.  History of abnormal paps: No.  History of STIs:  No  Mammogram: Date of last: 2020.  Result: Normal  Colonoscopy: Date of last: .  Result:  Polyps, benign.  Repeat due:  .    DEXA:  Date of last: .  Result:  normal.  Repeat due:  65 yo.     ROS:  As per HPI.      Exam  /88 (BP Location: Right arm, Patient Position: Sitting, BP  "Method: Medium (Manual))   Ht 5' 4" (1.626 m)   Wt 77 kg (169 lb 12.1 oz)   LMP  (LMP Unknown)   BMI 29.14 kg/m²   General: alert and oriented, no acute distress  Respiratory: normal respiratory effort  Abd: soft, non-tender, non-distended    Pelvic--deferred    Impression  1. Preop testing  COVID-19 Routine Screening   2. Cystocele with prolapse     3. Uterovaginal prolapse     4. Postmenopausal bleeding     5. Rectocele, female     6. Mixed stress and urge urinary incontinence     7. Abnormal pelvic ultrasound       We reviewed the above issues and discussed options for short-term versus long-term management of her problems.   Plan:   1. Patient consented with Dr. Whitfield for robotic assisted laparoscopic hysterectomy/sacrocolpopexy with synthetic mesh, possible anterior/posterior repair with perineorrhaphy, removal of tubes and ovaries, possible laparotomy, placement of synthetic midurethral sling, and cystourethroscopy.   R/B/A reviewed. Specific risks reviewed include:  infection, bleeding, need for blood transfusion, damage to surrounding structures, anesthesia risks, death, heart attack, stroke, mesh erosion/extrusion, pain, dyspareunia, urinary retention, voiding dysfunction, urinary incontinence, exacerbation of urinary urge incontinence, and need for further surgeries.  We reviewed potential for failure of POP defect repair and need for future surgery, with no way of predicting risk.  She understands success rate of ASC approaches 85%.  Success rate of midurethral sling for PROMISE was reviewed as 80-85%, and she understands that this will not necessarily impact other types of urinary incontinence.  Alternatives reviewed include: pessary/PT for POP and pessary/periurethral injections/PT/medication for PROMISE.    2. ?BSO: GM  from BRCA in 80s.  Ok with removing tubes/ovaries. Menopause at 40 yo.  Understands if has bridger symptoms, artemio vaginal dryness, we can treat those.   3. T&S, urine HCG on " DOS  4. Preoperative appointment with PCP or cardiology: Yes - scheduled with Ger Lim on 06/17  5. VTE Prophylaxis:  heparin 5000 u SQ TID (1st dose 2hrs preop) + SCDs  6. Patient instructed on Magnesium citrate and chlorahexadine/dial soap prep to perform day before & AM of surgery.   7. Proceed to OR for above-mentioned procedure.  8. DR. WEEKS ([facility]) GAVE OK FOR RAPID TESTING DAY OF SURGERY DUE TO 4TH OF JULY HOLIDAY CLOSURES        30 minutes were spent in face to face time with this patient  90 % of this time was spent in counseling and/or coordination of care    TARYN Lopez-BC [facility]  Division of Female Pelvic Medicine and Reconstructive Surgery  Department of Obstetrics & Gynecology

## 2020-06-13 ENCOUNTER — PATIENT MESSAGE (OUTPATIENT)
Dept: SURGERY | Facility: OTHER | Age: 60
End: 2020-06-13

## 2020-06-15 NOTE — TELEPHONE ENCOUNTER
"Spoke with patient.  Surgical procedures reviewed.  R/B/A reviewed.  Need for sling based on UDS reviewed.  She understands that risks are not high but are possible. She understands she can defer the sling but she may have PROMISE postop and need future Tx/surgery.  We reviewed other options: pessary, observation with positional voiding (she doesn't empty completely at times due to POP).  She also understands that she doesn't have to have surgery and can wait if desired.  She is very concerned about how surgery may affect her vaginal shape and sex life.  Reviewed fact that hysterectomy may shorten the vaginal slightly, POP surgery will resolve bulge and restore normal anatomy.  She could have postop dyspareunia, although uncommon.  We also reviewed fact that sometimes patients have muscle pain or other, non-surgical sources of pain (atrophy) that need to be addressed postop, but we can usually improve them. Sling and ASC are considered definitive, "permanent" procedures.     She asks about when she needs to get COVID testing--will have NP call to let her know.    Overall, after conversation and answering her questions, she feels much better and would still like to proceed with OR.  She has IUGA handouts and will review.  I let her know that I welcome any further questions/concerns if she has any others before surgery.      She has preop clearance appt/labs and will keep that appt.   "

## 2020-06-16 NOTE — PROGRESS NOTES
Subjective:       Patient ID: Mari Ambrocio is a 59 y.o. female.    Chief Complaint:   Pre-op Exam      HPI    #Last visit/Previous Provider  This patient is new to me  Previously seen by Silvio Mckeon MD  Last visit was   2/11/2020  Last CPE was 2/11/2020    Future Appointments   Date Time Provider Department Center   6/22/2020  9:00 AM PRE-ADMIT, [facility]           #Interim Hx  none    #Concerns Today  Pre- op    #Chronic Problems      Preoperative clearance    Surgical info; hysterectomy and reconstruction     Perninet PMHx; none    Mets;walk at least 2 mi once daily    Prior Surgery ; No side effects of anasthsia with prior surgery    Smoking/Etoh; former smoking, none    Pertinet meds  Medication List with Changes/Refills   Current Medications    VARICELLA-ZOSTER GE-AS01B, PF, (SHINGRIX, PF,) 50 MCG/0.5 ML INJECTION    Inject 0.5 mLs into the muscle As instructed. 2 doses             Health Maintenance   Topic Date Due    Mammogram  02/06/2021    Pap Smear with HPV Cotest  08/22/2022    Lipid Panel  01/30/2025    TETANUS VACCINE  01/03/2027    Hepatitis C Screening  Completed             Results for orders placed during the hospital encounter of 02/06/20   Mammo Digital Screening Bilat w/ Taras    Narrative Result:  Mammo Digital Screening Bilat w/ Taras    History:  Patient is 59 y.o. and is seen for a screening mammogram.    Films Compared:  Prior images (if available) were compared.    Findings:  Computer-aided detection was utilized in the interpretation of this   examination. This procedure was performed using tomosynthesis.       The breasts have scattered areas of fibroglandular density. There is no   evidence of suspicious masses, microcalcifications or architectural   distortion.      Impression No mammographic evidence of malignancy.    BI-RADS Category 1: Negative    Recommendation:  Routine screening mammogram in 1 year is recommended.     Your  "estimated lifetime risk of breast cancer (to age 85) based on   Tyrer-Cuzick risk assessment model is Tyrer-Cuzick: 7.54 %. According to   the American Cancer Society, patients with a lifetime breast cancer risk   of 20% or higher might benefit from supplemental screening tests.               Review of Systems   Constitutional: Negative for activity change, chills, diaphoresis, fatigue, fever and unexpected weight change.   Eyes: Negative for redness and visual disturbance.   Respiratory: Negative for shortness of breath.    Cardiovascular: Negative for chest pain and palpitations.   Gastrointestinal: Negative for abdominal distention and blood in stool.   Genitourinary: Negative for difficulty urinating, dysuria, frequency and menstrual problem.   Neurological: Negative for syncope and headaches.   All other systems reviewed and are negative.      Objective:   /70 (BP Location: Left arm, Patient Position: Sitting, BP Method: Large (Manual))   Pulse 71   Temp 97.7 °F (36.5 °C) (Temporal)   Ht 5' 4" (1.626 m)   Wt 76.8 kg (169 lb 5 oz)   LMP  (LMP Unknown)   SpO2 98%   BMI 29.06 kg/m²          Physical Exam  Vitals signs and nursing note reviewed.   Constitutional:       General: She is not in acute distress.     Appearance: She is well-developed. She is not diaphoretic.   HENT:      Head: Normocephalic.      Nose: Nose normal.   Eyes:      General:         Right eye: No discharge.         Left eye: No discharge.      Conjunctiva/sclera: Conjunctivae normal.      Pupils: Pupils are equal, round, and reactive to light.   Neck:      Musculoskeletal: Normal range of motion.   Cardiovascular:      Rate and Rhythm: Normal rate and regular rhythm.      Heart sounds: Normal heart sounds. No murmur. No friction rub. No gallop.    Pulmonary:      Effort: Pulmonary effort is normal. No respiratory distress.   Abdominal:      General: Bowel sounds are normal. There is no distension.      Palpations: Abdomen is " soft.   Musculoskeletal: Normal range of motion.         General: No deformity.   Skin:     General: Skin is warm.   Neurological:      Mental Status: She is alert and oriented to person, place, and time.      Cranial Nerves: No cranial nerve deficit.             ASCVD  The 10-year ASCVD risk score (Andrae CORONADO Jr., et al., 2013) is: 2%    Values used to calculate the score:      Age: 59 years      Sex: Female      Is Non- : No      Diabetic: No      Tobacco smoker: No      Systolic Blood Pressure: 100 mmHg      Is BP treated: No      HDL Cholesterol: 59 mg/dL      Total Cholesterol: 211 mg/dL    Basic Labs  BMP  Lab Results   Component Value Date     01/30/2020    K 4.3 01/30/2020     01/30/2020    CO2 27 01/30/2020    BUN 15 01/30/2020    CREATININE 0.9 01/30/2020    CALCIUM 9.9 01/30/2020    ANIONGAP 7 (L) 01/30/2020    ESTGFRAFRICA >60.0 01/30/2020    EGFRNONAA >60.0 01/30/2020     Lab Results   Component Value Date    ALT 15 01/30/2020    AST 16 01/30/2020    ALKPHOS 88 01/30/2020    BILITOT 0.5 01/30/2020       Lab Results   Component Value Date    TSH 1.470 02/01/2018           STD    Lab Results   Component Value Date    HEPCAB Negative 11/11/2014         Lipids  Lab Results   Component Value Date    CHOL 211 (H) 01/30/2020     Lab Results   Component Value Date    HDL 59 01/30/2020     Lab Results   Component Value Date    LDLCALC 132.8 01/30/2020     Lab Results   Component Value Date    TRIG 96 01/30/2020     Lab Results   Component Value Date    CHOLHDL 28.0 01/30/2020       DM  Lab Results   Component Value Date    HGBA1C 5.6 11/21/2015         Assessment:       1. Preop examination            Plan:             Mari was seen today for pre-op exam.    Diagnoses and all orders for this visit:      Pre-op examination   Patient does not present with active unstable cardiac conditions such as ACS, unstable arrhythmia or ADHF  No personal  history of CAD/MI, CVA/TIA, DM  requiring insulin , or Cr>2    Patient is able to walk at least one flight of stairs  without symptoms of chest pain   RCRI score is 0   Patient is low risk patient going for intermediate  risk surgery and would not  recommend further evaluation prior to surgery      Future Appointments   Date Time Provider Department Center   6/22/2020  9:00 [facility]           Medication List with Changes/Refills   Current Medications    VARICELLA-ZOSTER GE-AS01B, PF, (SHINGRIX, PF,) 50 MCG/0.5 ML INJECTION    Inject 0.5 mLs into the muscle As instructed. 2 doses       Disclaimer:  This note has been generated using voice-recognition software. There may be grammatical or spelling errors that have been missed during proof-reading

## 2020-06-17 ENCOUNTER — OFFICE VISIT (OUTPATIENT)
Dept: INTERNAL MEDICINE | Facility: CLINIC | Age: 60
End: 2020-06-17
Payer: COMMERCIAL

## 2020-06-17 VITALS
WEIGHT: 169.31 LBS | BODY MASS INDEX: 28.91 KG/M2 | DIASTOLIC BLOOD PRESSURE: 70 MMHG | HEIGHT: 64 IN | HEART RATE: 71 BPM | TEMPERATURE: 98 F | OXYGEN SATURATION: 98 % | SYSTOLIC BLOOD PRESSURE: 100 MMHG

## 2020-06-17 DIAGNOSIS — Z01.818 PREOP EXAMINATION: Primary | ICD-10-CM

## 2020-06-17 PROCEDURE — 99999 PR PBB SHADOW E&M-EST. PATIENT-LVL IV: CPT | Mod: PBBFAC,,, | Performed by: INTERNAL MEDICINE

## 2020-06-17 PROCEDURE — 99213 OFFICE O/P EST LOW 20 MIN: CPT | Mod: S$GLB,,, | Performed by: INTERNAL MEDICINE

## 2020-06-17 PROCEDURE — 99999 PR PBB SHADOW E&M-EST. PATIENT-LVL IV: ICD-10-PCS | Mod: PBBFAC,,, | Performed by: INTERNAL MEDICINE

## 2020-06-17 PROCEDURE — 99213 PR OFFICE/OUTPT VISIT, EST, LEVL III, 20-29 MIN: ICD-10-PCS | Mod: S$GLB,,, | Performed by: INTERNAL MEDICINE

## 2020-06-17 PROCEDURE — 3008F BODY MASS INDEX DOCD: CPT | Mod: CPTII,S$GLB,, | Performed by: INTERNAL MEDICINE

## 2020-06-17 PROCEDURE — 3008F PR BODY MASS INDEX (BMI) DOCUMENTED: ICD-10-PCS | Mod: CPTII,S$GLB,, | Performed by: INTERNAL MEDICINE

## 2020-06-22 ENCOUNTER — HOSPITAL ENCOUNTER (OUTPATIENT)
Dept: PREADMISSION TESTING | Facility: OTHER | Age: 60
Discharge: HOME | End: 2020-06-22
Attending: ANESTHESIOLOGY
Payer: COMMERCIAL

## 2020-06-22 ENCOUNTER — ANESTHESIA EVENT (OUTPATIENT)
Dept: SURGERY | Facility: OTHER | Age: 60
End: 2020-06-22
Payer: COMMERCIAL

## 2020-06-22 ENCOUNTER — TELEPHONE (OUTPATIENT)
Dept: UROGYNECOLOGY | Facility: CLINIC | Age: 60
End: 2020-06-22

## 2020-06-22 VITALS
OXYGEN SATURATION: 98 % | HEART RATE: 68 BPM | WEIGHT: 165 LBS | HEIGHT: 64 IN | DIASTOLIC BLOOD PRESSURE: 69 MMHG | BODY MASS INDEX: 28.17 KG/M2 | TEMPERATURE: 97 F | SYSTOLIC BLOOD PRESSURE: 119 MMHG

## 2020-06-22 DIAGNOSIS — Z01.818 PREOP TESTING: ICD-10-CM

## 2020-06-22 LAB
ABO + RH BLD: NORMAL
ANION GAP SERPL CALC-SCNC: 11 MMOL/L (ref 8–16)
BASOPHILS # BLD AUTO: 0.04 K/UL (ref 0–0.2)
BASOPHILS NFR BLD: 0.6 % (ref 0–1.9)
BLD GP AB SCN CELLS X3 SERPL QL: NORMAL
BLOOD GROUP ANTIBODIES SERPL: NORMAL
BUN SERPL-MCNC: 13 MG/DL (ref 6–20)
CALCIUM SERPL-MCNC: 9.7 MG/DL (ref 8.7–10.5)
CHLORIDE SERPL-SCNC: 109 MMOL/L (ref 95–110)
CO2 SERPL-SCNC: 23 MMOL/L (ref 23–29)
CREAT SERPL-MCNC: 0.8 MG/DL (ref 0.5–1.4)
DIFFERENTIAL METHOD: NORMAL
EOSINOPHIL # BLD AUTO: 0.1 K/UL (ref 0–0.5)
EOSINOPHIL NFR BLD: 2 % (ref 0–8)
ERYTHROCYTE [DISTWIDTH] IN BLOOD BY AUTOMATED COUNT: 12.6 % (ref 11.5–14.5)
EST. GFR  (AFRICAN AMERICAN): >60 ML/MIN/1.73 M^2
EST. GFR  (NON AFRICAN AMERICAN): >60 ML/MIN/1.73 M^2
GLUCOSE SERPL-MCNC: 101 MG/DL (ref 70–110)
HCT VFR BLD AUTO: 45.2 % (ref 37–48.5)
HGB BLD-MCNC: 14.9 G/DL (ref 12–16)
IMM GRANULOCYTES # BLD AUTO: 0.02 K/UL (ref 0–0.04)
IMM GRANULOCYTES NFR BLD AUTO: 0.3 % (ref 0–0.5)
LYMPHOCYTES # BLD AUTO: 1.5 K/UL (ref 1–4.8)
LYMPHOCYTES NFR BLD: 21.9 % (ref 18–48)
MCH RBC QN AUTO: 29.2 PG (ref 27–31)
MCHC RBC AUTO-ENTMCNC: 33 G/DL (ref 32–36)
MCV RBC AUTO: 89 FL (ref 82–98)
MONOCYTES # BLD AUTO: 0.5 K/UL (ref 0.3–1)
MONOCYTES NFR BLD: 6.9 % (ref 4–15)
NEUTROPHILS # BLD AUTO: 4.7 K/UL (ref 1.8–7.7)
NEUTROPHILS NFR BLD: 68.3 % (ref 38–73)
NRBC BLD-RTO: 0 /100 WBC
PLATELET # BLD AUTO: 271 K/UL (ref 150–350)
PMV BLD AUTO: 11.7 FL (ref 9.2–12.9)
POTASSIUM SERPL-SCNC: 4.2 MMOL/L (ref 3.5–5.1)
RBC # BLD AUTO: 5.11 M/UL (ref 4–5.4)
SODIUM SERPL-SCNC: 143 MMOL/L (ref 136–145)
WBC # BLD AUTO: 6.86 K/UL (ref 3.9–12.7)

## 2020-06-22 PROCEDURE — 86870 RBC ANTIBODY IDENTIFICATION: CPT

## 2020-06-22 PROCEDURE — 93005 ELECTROCARDIOGRAM TRACING: CPT

## 2020-06-22 PROCEDURE — 36415 COLL VENOUS BLD VENIPUNCTURE: CPT

## 2020-06-22 PROCEDURE — 86901 BLOOD TYPING SEROLOGIC RH(D): CPT

## 2020-06-22 PROCEDURE — 93010 ELECTROCARDIOGRAM REPORT: CPT | Mod: ,,, | Performed by: INTERNAL MEDICINE

## 2020-06-22 PROCEDURE — 80048 BASIC METABOLIC PNL TOTAL CA: CPT

## 2020-06-22 PROCEDURE — 93010 EKG 12-LEAD: ICD-10-PCS | Mod: ,,, | Performed by: INTERNAL MEDICINE

## 2020-06-22 PROCEDURE — 85025 COMPLETE CBC W/AUTO DIFF WBC: CPT

## 2020-06-22 RX ORDER — FAMOTIDINE 20 MG/1
20 TABLET, FILM COATED ORAL
Status: CANCELLED | OUTPATIENT
Start: 2020-06-22 | End: 2020-06-22

## 2020-06-22 RX ORDER — SODIUM CHLORIDE, SODIUM LACTATE, POTASSIUM CHLORIDE, CALCIUM CHLORIDE 600; 310; 30; 20 MG/100ML; MG/100ML; MG/100ML; MG/100ML
INJECTION, SOLUTION INTRAVENOUS CONTINUOUS
Status: CANCELLED | OUTPATIENT
Start: 2020-06-22

## 2020-06-22 RX ORDER — LIDOCAINE HYDROCHLORIDE 10 MG/ML
0.5 INJECTION, SOLUTION EPIDURAL; INFILTRATION; INTRACAUDAL; PERINEURAL ONCE
Status: CANCELLED | OUTPATIENT
Start: 2020-06-22 | End: 2020-06-22

## 2020-06-22 RX ORDER — MIDAZOLAM HYDROCHLORIDE 1 MG/ML
2 INJECTION INTRAMUSCULAR; INTRAVENOUS ONCE AS NEEDED
Status: CANCELLED | OUTPATIENT
Start: 2020-06-22 | End: 2031-11-19

## 2020-06-22 RX ORDER — ACETAMINOPHEN 500 MG
1000 TABLET ORAL
Status: CANCELLED | OUTPATIENT
Start: 2020-06-22 | End: 2020-06-22

## 2020-06-22 RX ORDER — PREGABALIN 50 MG/1
50 CAPSULE ORAL
Status: CANCELLED | OUTPATIENT
Start: 2020-06-22 | End: 2020-06-22

## 2020-06-22 NOTE — DISCHARGE INSTRUCTIONS
Information to Prepare you for your Surgery    PRE-ADMIT TESTING -  [phone]    [address]  [facility]          Your surgery has been scheduled at [facility]. We are pleased to have the opportunity to serve you. For Further Information please call [phone].    On the day of surgery please report to the Information Desk on the 1st floor.    · CONTACT YOUR PHYSICIAN'S OFFICE THE DAY PRIOR TO YOUR SURGERY TO OBTAIN YOUR ARRIVAL TIME.     · The evening before surgery do not eat anything after 9 p.m. ( this includes hard candy, chewing gum and mints).  You may only have GATORADE, POWERADE AND WATER  from 9 p.m. until you leave your home.   DO NOT DRINK ANY LIQUIDS ON THE WAY TO THE HOSPITAL.      SPECIAL MEDICATION INSTRUCTIONS: TAKE medications checked off by the Anesthesiologist on your Medication List.    Angiogram Patients: Take medications as instructed by your physician, including aspirin.     Surgery Patients:    If you take ASPIRIN - Your PHYSICIAN/SURGEON will need to inform you IF/OR when you need to stop taking aspirin prior to your surgery.     Do Not take any medications containing IBUPROFEN.  Do Not Wear any make-up or dark nail polish   (especially eye make-up) to surgery. If you come to surgery with makeup on you will be required to remove the makeup or nail polish.    Do not shave your surgical area at least 5 days prior to your surgery. The surgical prep will be performed at the hospital according to Infection Control regulations.    Leave all valuables at home.   Do Not wear any jewelry or watches, including any metal in body piercings. Jewelry must be removed prior to coming to the hospital.  There is a possibility that rings that are unable to be removed may be cut off if they are on the surgical extremity.    Contact Lens must be removed before surgery. Either do not wear the contact lens or bring a case and solution for  storage.  Please bring a container for eyeglasses or dentures as required.  Bring any paperwork your physician has provided, such as consent forms,  history and physicals, doctor's orders, etc.   Bring comfortable clothes that are loose fitting to wear upon discharge. Take into consideration the type of surgery being performed.  Maintain your diet as advised per your physician the day prior to surgery.      Adequate rest the night before surgery is advised.   Park in the Parking lot behind the hospital or in the [facility] across the street from the parking lot. Parking is complimentary.  If you will be discharged the same day as your procedure, please arrange for a responsible adult to drive you home or to accompany you if traveling by taxi.   YOU WILL NOT BE PERMITTED TO DRIVE OR TO LEAVE THE HOSPITAL ALONE AFTER SURGERY.   If you are being discharged the same day, it is strongly recommended that you arrange for someone to remain with you for the first 24 hrs following your surgery.    The Surgeon will speak to your family/visitor after your surgery regarding the outcome of your surgery and post op care.  The Surgeon may speak to you after your surgery, but there is a possibility you may not remember the details.  Please check with your family members regarding the conversation with the Surgeon.    We strongly recommend whoever is bringing you home be present for discharge instructions.  This will ensure a thorough understanding for your post op home care.    ALL CHILDREN MUST ALWAYS BE ACCOMPANIED BY AN ADULT.    Visitors-Refer to current Visitor policy handouts.    Thank you for your cooperation.  The Staff of [facility].                Bathing Instructions with Hibiclens     Shower the evening before and morning of your procedure with Hibiclens:   Wash your face with water and your regular face wash/soap   Apply Hibiclens directly on your skin or on a wet washcloth and wash  gently. When showering: Move away from the shower stream when applying Hibiclens to avoid rinsing off too soon.   Rinse thoroughly with warm water   Do not dilute Hibiclens         Dry off as usual, do not use any deodorant, powder, body lotions, perfume, after shave or cologne.

## 2020-06-22 NOTE — TELEPHONE ENCOUNTER
Spoke with patient regarding normal lab results. Patient stated that she saw results in patient portal but was thankful for the follow-up call.

## 2020-06-22 NOTE — ANESTHESIA PREPROCEDURE EVALUATION
06/22/2020  Mari Ambrcoio is a 59 y.o., female.    Anesthesia Evaluation    I have reviewed the Patient Summary Reports.    I have reviewed the Nursing Notes. I have reviewed the NPO Status.      Review of Systems  Anesthesia Hx:  Hx of Anesthetic complications (post op nausea) PONV History of prior surgery of interest to airway management or planning: Denies Family Hx of Anesthesia complications.  Personal Hx of Anesthesia complications, Post-Operative Nausea/Vomiting, in the past, but not with recent anesthetics / prophylaxis   Social:  Non-Smoker    Hematology/Oncology:  Hematology Normal   Oncology Normal     EENT/Dental:EENT/Dental Normal   Cardiovascular:  Cardiovascular Normal Exercise tolerance: good     Pulmonary:  Pulmonary Normal    Renal/:  Renal/ Normal  Bout of renal insuff 5 yrs ago,normal fcn since   Hepatic/GI:  Hepatic/GI Normal    Musculoskeletal:   Arthritis     Neurological:   Neuromuscular Disease,    Endocrine:  Endocrine Normal    Dermatological:  Skin Normal    Psych:  Psychiatric Normal           Physical Exam  General:  Well nourished    Airway/Jaw/Neck:  Airway Findings: Mouth Opening: Normal Tongue: Normal  General Airway Assessment: Adult  Mallampati: II  TM Distance: Normal, at least 6 cm  Jaw/Neck Findings:     Neck ROM: Normal ROM      Dental:  Dental Findings: In tact        Mental Status:  Mental Status Findings:  Cooperative, Alert and Oriented         Anesthesia Plan  Type of Anesthesia, risks & benefits discussed:  Anesthesia Type:  general  Patient's Preference:   Intra-op Monitoring Plan: standard ASA monitors  Intra-op Monitoring Plan Comments:   Post Op Pain Control Plan: per primary service following discharge from PACU and multimodal analgesia  Post Op Pain Control Plan Comments:   Induction:   IV  Beta Blocker:         Informed Consent: Patient understands  risks and agrees with Anesthesia plan.  Questions answered. Anesthesia consent signed with patient.  ASA Score: 2     Day of Surgery Review of History & Physical:    H&P update referred to the surgeon.     Anesthesia Plan Notes: Labs ordered.All labs WNL        Ready For Surgery From Anesthesia Perspective.

## 2020-06-23 NOTE — TELEPHONE ENCOUNTER
Spoke with patient regarding concerns of Magnesium Citrate. Instructed patient on pre-op instructions and where to purchase mag citrate. All concerns and questions addressed. Instructed to call office with any further questions.

## 2020-07-06 ENCOUNTER — ANESTHESIA (OUTPATIENT)
Dept: SURGERY | Facility: OTHER | Age: 60
End: 2020-07-06
Payer: COMMERCIAL

## 2020-07-06 ENCOUNTER — HOSPITAL ENCOUNTER (OUTPATIENT)
Facility: OTHER | Age: 60
Discharge: HOME | End: 2020-07-07
Attending: OBSTETRICS & GYNECOLOGY | Admitting: OBSTETRICS & GYNECOLOGY
Payer: COMMERCIAL

## 2020-07-06 DIAGNOSIS — Z98.890 POST-OPERATIVE STATE: ICD-10-CM

## 2020-07-06 DIAGNOSIS — N81.4 UTEROVAGINAL PROLAPSE: Primary | ICD-10-CM

## 2020-07-06 LAB — SARS-COV-2 RDRP RESP QL NAA+PROBE: NEGATIVE

## 2020-07-06 PROCEDURE — 71000039 HC RECOVERY, EACH ADD'L HOUR: Performed by: OBSTETRICS & GYNECOLOGY

## 2020-07-06 PROCEDURE — 58571 PR LAPAROSCOPY W TOT HYSTERECTUTERUS <=250 GRAM  W TUBE/OVARY: ICD-10-PCS | Mod: AS,51,, | Performed by: PHYSICIAN ASSISTANT

## 2020-07-06 PROCEDURE — 57288 REPAIR BLADDER DEFECT: CPT | Mod: 51,,, | Performed by: OBSTETRICS & GYNECOLOGY

## 2020-07-06 PROCEDURE — 63600175 PHARM REV CODE 636 W HCPCS: Performed by: PHYSICIAN ASSISTANT

## 2020-07-06 PROCEDURE — 63600175 PHARM REV CODE 636 W HCPCS: Performed by: OBSTETRICS & GYNECOLOGY

## 2020-07-06 PROCEDURE — 25000003 PHARM REV CODE 250: Performed by: ANESTHESIOLOGY

## 2020-07-06 PROCEDURE — 94761 N-INVAS EAR/PLS OXIMETRY MLT: CPT

## 2020-07-06 PROCEDURE — 57425 LAPAROSCOPY SURG COLPOPEXY: CPT | Mod: ,,, | Performed by: OBSTETRICS & GYNECOLOGY

## 2020-07-06 PROCEDURE — 63600175 PHARM REV CODE 636 W HCPCS: Performed by: NURSE ANESTHETIST, CERTIFIED REGISTERED

## 2020-07-06 PROCEDURE — U0002 COVID-19 LAB TEST NON-CDC: HCPCS

## 2020-07-06 PROCEDURE — 27201423 OPTIME MED/SURG SUP & DEVICES STERILE SUPPLY: Performed by: OBSTETRICS & GYNECOLOGY

## 2020-07-06 PROCEDURE — 71000033 HC RECOVERY, INTIAL HOUR: Performed by: OBSTETRICS & GYNECOLOGY

## 2020-07-06 PROCEDURE — C1765 ADHESION BARRIER: HCPCS | Performed by: OBSTETRICS & GYNECOLOGY

## 2020-07-06 PROCEDURE — C1771 REP DEV, URINARY, W/SLING: HCPCS | Performed by: OBSTETRICS & GYNECOLOGY

## 2020-07-06 PROCEDURE — 88305 TISSUE EXAM BY PATHOLOGIST: CPT | Performed by: PATHOLOGY

## 2020-07-06 PROCEDURE — 27000221 HC OXYGEN, UP TO 24 HOURS

## 2020-07-06 PROCEDURE — 25000003 PHARM REV CODE 250: Performed by: NURSE ANESTHETIST, CERTIFIED REGISTERED

## 2020-07-06 PROCEDURE — 63600175 PHARM REV CODE 636 W HCPCS: Performed by: ANESTHESIOLOGY

## 2020-07-06 PROCEDURE — 25000003 PHARM REV CODE 250: Performed by: PHYSICIAN ASSISTANT

## 2020-07-06 PROCEDURE — 57425 PR LAPAROSCOPY, SURG, COLPOPEXY: ICD-10-PCS | Mod: ,,, | Performed by: OBSTETRICS & GYNECOLOGY

## 2020-07-06 PROCEDURE — 57425 PR LAPAROSCOPY, SURG, COLPOPEXY: ICD-10-PCS | Mod: AS,,, | Performed by: PHYSICIAN ASSISTANT

## 2020-07-06 PROCEDURE — 25000003 PHARM REV CODE 250: Performed by: OBSTETRICS & GYNECOLOGY

## 2020-07-06 PROCEDURE — 57288 REPAIR BLADDER DEFECT: CPT | Mod: AS,51,, | Performed by: PHYSICIAN ASSISTANT

## 2020-07-06 PROCEDURE — C2628 CATHETER, OCCLUSION: HCPCS | Performed by: OBSTETRICS & GYNECOLOGY

## 2020-07-06 PROCEDURE — 88305 TISSUE EXAM BY PATHOLOGIST: CPT | Mod: 26,,, | Performed by: PATHOLOGY

## 2020-07-06 PROCEDURE — 58571 TLH W/T/O 250 G OR LESS: CPT | Mod: 51,,, | Performed by: OBSTETRICS & GYNECOLOGY

## 2020-07-06 PROCEDURE — P9045 ALBUMIN (HUMAN), 5%, 250 ML: HCPCS | Mod: JG | Performed by: NURSE ANESTHETIST, CERTIFIED REGISTERED

## 2020-07-06 PROCEDURE — 82962 GLUCOSE BLOOD TEST: CPT | Performed by: OBSTETRICS & GYNECOLOGY

## 2020-07-06 PROCEDURE — 57288 PR SLING OPER STRES INCONTINENCE: ICD-10-PCS | Mod: AS,51,, | Performed by: PHYSICIAN ASSISTANT

## 2020-07-06 PROCEDURE — 37000008 HC ANESTHESIA 1ST 15 MINUTES: Performed by: OBSTETRICS & GYNECOLOGY

## 2020-07-06 PROCEDURE — 57288 PR SLING OPER STRES INCONTINENCE: ICD-10-PCS | Mod: 51,,, | Performed by: OBSTETRICS & GYNECOLOGY

## 2020-07-06 PROCEDURE — C1781 MESH (IMPLANTABLE): HCPCS | Performed by: OBSTETRICS & GYNECOLOGY

## 2020-07-06 PROCEDURE — 36000712 HC OR TIME LEV V 1ST 15 MIN: Performed by: OBSTETRICS & GYNECOLOGY

## 2020-07-06 PROCEDURE — 58571 PR LAPAROSCOPY W TOT HYSTERECTUTERUS <=250 GRAM  W TUBE/OVARY: ICD-10-PCS | Mod: 51,,, | Performed by: OBSTETRICS & GYNECOLOGY

## 2020-07-06 PROCEDURE — 88305 TISSUE EXAM BY PATHOLOGIST: ICD-10-PCS | Mod: 26,,, | Performed by: PATHOLOGY

## 2020-07-06 PROCEDURE — 57425 LAPAROSCOPY SURG COLPOPEXY: CPT | Mod: AS,,, | Performed by: PHYSICIAN ASSISTANT

## 2020-07-06 PROCEDURE — 58571 TLH W/T/O 250 G OR LESS: CPT | Mod: AS,51,, | Performed by: PHYSICIAN ASSISTANT

## 2020-07-06 PROCEDURE — 37000009 HC ANESTHESIA EA ADD 15 MINS: Performed by: OBSTETRICS & GYNECOLOGY

## 2020-07-06 PROCEDURE — 36000713 HC OR TIME LEV V EA ADD 15 MIN: Performed by: OBSTETRICS & GYNECOLOGY

## 2020-07-06 PROCEDURE — S0020 INJECTION, BUPIVICAINE HYDRO: HCPCS | Performed by: OBSTETRICS & GYNECOLOGY

## 2020-07-06 DEVICE — BARRIER INTERCEED 3X4 ST DIS: Type: IMPLANTABLE DEVICE | Site: ABDOMEN | Status: FUNCTIONAL

## 2020-07-06 DEVICE — MESH VAGINAL PROLAPSE 10X15CM: Type: IMPLANTABLE DEVICE | Site: VAGINA | Status: FUNCTIONAL

## 2020-07-06 DEVICE — SLING FIT ADVANTAGE: Type: IMPLANTABLE DEVICE | Site: URETHRA | Status: FUNCTIONAL

## 2020-07-06 RX ORDER — NEOSTIGMINE METHYLSULFATE 1 MG/ML
INJECTION, SOLUTION INTRAVENOUS
Status: DISCONTINUED | OUTPATIENT
Start: 2020-07-06 | End: 2020-07-06

## 2020-07-06 RX ORDER — DEXAMETHASONE SODIUM PHOSPHATE 4 MG/ML
INJECTION, SOLUTION INTRA-ARTICULAR; INTRALESIONAL; INTRAMUSCULAR; INTRAVENOUS; SOFT TISSUE
Status: DISCONTINUED | OUTPATIENT
Start: 2020-07-06 | End: 2020-07-06

## 2020-07-06 RX ORDER — HYDROMORPHONE HYDROCHLORIDE 1 MG/ML
1 INJECTION, SOLUTION INTRAMUSCULAR; INTRAVENOUS; SUBCUTANEOUS EVERY 6 HOURS PRN
Status: DISCONTINUED | OUTPATIENT
Start: 2020-07-06 | End: 2020-07-07 | Stop reason: HOSPADM

## 2020-07-06 RX ORDER — LIDOCAINE HYDROCHLORIDE 10 MG/ML
0.5 INJECTION, SOLUTION EPIDURAL; INFILTRATION; INTRACAUDAL; PERINEURAL ONCE
Status: DISCONTINUED | OUTPATIENT
Start: 2020-07-06 | End: 2020-07-06 | Stop reason: HOSPADM

## 2020-07-06 RX ORDER — OXYCODONE HYDROCHLORIDE 5 MG/1
5 TABLET ORAL
Status: DISCONTINUED | OUTPATIENT
Start: 2020-07-06 | End: 2020-07-06 | Stop reason: HOSPADM

## 2020-07-06 RX ORDER — LIDOCAINE HYDROCHLORIDE 20 MG/ML
INJECTION INTRAVENOUS
Status: DISCONTINUED | OUTPATIENT
Start: 2020-07-06 | End: 2020-07-06

## 2020-07-06 RX ORDER — KETOROLAC TROMETHAMINE 30 MG/ML
INJECTION, SOLUTION INTRAMUSCULAR; INTRAVENOUS
Status: DISCONTINUED | OUTPATIENT
Start: 2020-07-06 | End: 2020-07-06

## 2020-07-06 RX ORDER — LIDOCAINE HYDROCHLORIDE AND EPINEPHRINE 10; 10 MG/ML; UG/ML
INJECTION, SOLUTION INFILTRATION; PERINEURAL
Status: DISCONTINUED | OUTPATIENT
Start: 2020-07-06 | End: 2020-07-06 | Stop reason: HOSPADM

## 2020-07-06 RX ORDER — OXYCODONE AND ACETAMINOPHEN 10; 325 MG/1; MG/1
1 TABLET ORAL EVERY 4 HOURS PRN
Status: DISCONTINUED | OUTPATIENT
Start: 2020-07-06 | End: 2020-07-07 | Stop reason: HOSPADM

## 2020-07-06 RX ORDER — DIPHENHYDRAMINE HYDROCHLORIDE 50 MG/ML
25 INJECTION INTRAMUSCULAR; INTRAVENOUS EVERY 4 HOURS PRN
Status: DISCONTINUED | OUTPATIENT
Start: 2020-07-06 | End: 2020-07-07 | Stop reason: HOSPADM

## 2020-07-06 RX ORDER — SODIUM CHLORIDE, SODIUM LACTATE, POTASSIUM CHLORIDE, CALCIUM CHLORIDE 600; 310; 30; 20 MG/100ML; MG/100ML; MG/100ML; MG/100ML
INJECTION, SOLUTION INTRAVENOUS CONTINUOUS
Status: DISCONTINUED | OUTPATIENT
Start: 2020-07-06 | End: 2020-07-06

## 2020-07-06 RX ORDER — MUPIROCIN 20 MG/G
OINTMENT TOPICAL
Status: DISCONTINUED | OUTPATIENT
Start: 2020-07-06 | End: 2020-07-06 | Stop reason: HOSPADM

## 2020-07-06 RX ORDER — MEPERIDINE HYDROCHLORIDE 25 MG/ML
12.5 INJECTION INTRAMUSCULAR; INTRAVENOUS; SUBCUTANEOUS ONCE AS NEEDED
Status: DISCONTINUED | OUTPATIENT
Start: 2020-07-06 | End: 2020-07-06 | Stop reason: HOSPADM

## 2020-07-06 RX ORDER — ONDANSETRON 2 MG/ML
INJECTION INTRAMUSCULAR; INTRAVENOUS
Status: DISCONTINUED | OUTPATIENT
Start: 2020-07-06 | End: 2020-07-06

## 2020-07-06 RX ORDER — FAMOTIDINE 20 MG/1
20 TABLET, FILM COATED ORAL
Status: COMPLETED | OUTPATIENT
Start: 2020-07-06 | End: 2020-07-06

## 2020-07-06 RX ORDER — HEPARIN SODIUM 5000 [USP'U]/ML
5000 INJECTION, SOLUTION INTRAVENOUS; SUBCUTANEOUS
Status: DISCONTINUED | OUTPATIENT
Start: 2020-07-06 | End: 2020-07-06 | Stop reason: HOSPADM

## 2020-07-06 RX ORDER — FENTANYL CITRATE 50 UG/ML
INJECTION, SOLUTION INTRAMUSCULAR; INTRAVENOUS
Status: DISCONTINUED | OUTPATIENT
Start: 2020-07-06 | End: 2020-07-06

## 2020-07-06 RX ORDER — ONDANSETRON 8 MG/1
8 TABLET, ORALLY DISINTEGRATING ORAL EVERY 8 HOURS PRN
Status: DISCONTINUED | OUTPATIENT
Start: 2020-07-06 | End: 2020-07-07 | Stop reason: HOSPADM

## 2020-07-06 RX ORDER — IBUPROFEN 600 MG/1
600 TABLET ORAL EVERY 6 HOURS
Status: DISCONTINUED | OUTPATIENT
Start: 2020-07-07 | End: 2020-07-07

## 2020-07-06 RX ORDER — HEPARIN SODIUM 5000 [USP'U]/ML
5000 INJECTION, SOLUTION INTRAVENOUS; SUBCUTANEOUS EVERY 8 HOURS
Status: DISCONTINUED | OUTPATIENT
Start: 2020-07-06 | End: 2020-07-07 | Stop reason: HOSPADM

## 2020-07-06 RX ORDER — DIPHENHYDRAMINE HCL 25 MG
25 CAPSULE ORAL EVERY 4 HOURS PRN
Status: DISCONTINUED | OUTPATIENT
Start: 2020-07-06 | End: 2020-07-07 | Stop reason: HOSPADM

## 2020-07-06 RX ORDER — DEXTROSE MONOHYDRATE, SODIUM CHLORIDE, AND POTASSIUM CHLORIDE 50; 1.49; 4.5 G/1000ML; G/1000ML; G/1000ML
INJECTION, SOLUTION INTRAVENOUS CONTINUOUS
Status: DISCONTINUED | OUTPATIENT
Start: 2020-07-06 | End: 2020-07-07

## 2020-07-06 RX ORDER — PREGABALIN 50 MG/1
50 CAPSULE ORAL
Status: COMPLETED | OUTPATIENT
Start: 2020-07-06 | End: 2020-07-06

## 2020-07-06 RX ORDER — ONDANSETRON 2 MG/ML
4 INJECTION INTRAMUSCULAR; INTRAVENOUS DAILY PRN
Status: DISCONTINUED | OUTPATIENT
Start: 2020-07-06 | End: 2020-07-06 | Stop reason: HOSPADM

## 2020-07-06 RX ORDER — HYDROMORPHONE HYDROCHLORIDE 2 MG/ML
0.4 INJECTION, SOLUTION INTRAMUSCULAR; INTRAVENOUS; SUBCUTANEOUS EVERY 5 MIN PRN
Status: DISCONTINUED | OUTPATIENT
Start: 2020-07-06 | End: 2020-07-06 | Stop reason: HOSPADM

## 2020-07-06 RX ORDER — ROCURONIUM BROMIDE 10 MG/ML
INJECTION, SOLUTION INTRAVENOUS
Status: DISCONTINUED | OUTPATIENT
Start: 2020-07-06 | End: 2020-07-06

## 2020-07-06 RX ORDER — OXYCODONE AND ACETAMINOPHEN 5; 325 MG/1; MG/1
1 TABLET ORAL EVERY 4 HOURS PRN
Status: DISCONTINUED | OUTPATIENT
Start: 2020-07-06 | End: 2020-07-07 | Stop reason: HOSPADM

## 2020-07-06 RX ORDER — PROPOFOL 10 MG/ML
VIAL (ML) INTRAVENOUS
Status: DISCONTINUED | OUTPATIENT
Start: 2020-07-06 | End: 2020-07-06

## 2020-07-06 RX ORDER — SODIUM CHLORIDE, SODIUM LACTATE, POTASSIUM CHLORIDE, CALCIUM CHLORIDE 600; 310; 30; 20 MG/100ML; MG/100ML; MG/100ML; MG/100ML
INJECTION, SOLUTION INTRAVENOUS CONTINUOUS PRN
Status: DISCONTINUED | OUTPATIENT
Start: 2020-07-06 | End: 2020-07-06

## 2020-07-06 RX ORDER — CEFAZOLIN SODIUM 1 G/3ML
2 INJECTION, POWDER, FOR SOLUTION INTRAMUSCULAR; INTRAVENOUS
Status: DISCONTINUED | OUTPATIENT
Start: 2020-07-06 | End: 2020-07-06 | Stop reason: HOSPADM

## 2020-07-06 RX ORDER — DOCUSATE SODIUM 100 MG/1
200 CAPSULE, LIQUID FILLED ORAL 2 TIMES DAILY
Status: DISCONTINUED | OUTPATIENT
Start: 2020-07-06 | End: 2020-07-07 | Stop reason: HOSPADM

## 2020-07-06 RX ORDER — ALBUMIN HUMAN 50 G/1000ML
SOLUTION INTRAVENOUS CONTINUOUS PRN
Status: DISCONTINUED | OUTPATIENT
Start: 2020-07-06 | End: 2020-07-06

## 2020-07-06 RX ORDER — MIDAZOLAM HYDROCHLORIDE 1 MG/ML
INJECTION INTRAMUSCULAR; INTRAVENOUS
Status: DISCONTINUED | OUTPATIENT
Start: 2020-07-06 | End: 2020-07-06

## 2020-07-06 RX ORDER — MIDAZOLAM HYDROCHLORIDE 1 MG/ML
2 INJECTION INTRAMUSCULAR; INTRAVENOUS ONCE AS NEEDED
Status: DISCONTINUED | OUTPATIENT
Start: 2020-07-06 | End: 2020-07-06 | Stop reason: HOSPADM

## 2020-07-06 RX ORDER — ACETAMINOPHEN 500 MG
1000 TABLET ORAL
Status: COMPLETED | OUTPATIENT
Start: 2020-07-06 | End: 2020-07-06

## 2020-07-06 RX ORDER — EPHEDRINE SULFATE 50 MG/ML
INJECTION, SOLUTION INTRAVENOUS
Status: DISCONTINUED | OUTPATIENT
Start: 2020-07-06 | End: 2020-07-06

## 2020-07-06 RX ORDER — SODIUM CHLORIDE 0.9 % (FLUSH) 0.9 %
3 SYRINGE (ML) INJECTION
Status: DISCONTINUED | OUTPATIENT
Start: 2020-07-06 | End: 2020-07-06

## 2020-07-06 RX ORDER — GLYCOPYRROLATE 0.2 MG/ML
INJECTION INTRAMUSCULAR; INTRAVENOUS
Status: DISCONTINUED | OUTPATIENT
Start: 2020-07-06 | End: 2020-07-06

## 2020-07-06 RX ORDER — BUPIVACAINE HYDROCHLORIDE 5 MG/ML
INJECTION, SOLUTION EPIDURAL; INTRACAUDAL
Status: DISCONTINUED | OUTPATIENT
Start: 2020-07-06 | End: 2020-07-06 | Stop reason: HOSPADM

## 2020-07-06 RX ADMIN — FENTANYL CITRATE 50 MCG: 50 INJECTION, SOLUTION INTRAMUSCULAR; INTRAVENOUS at 06:07

## 2020-07-06 RX ADMIN — PROPOFOL 200 MG: 10 INJECTION, EMULSION INTRAVENOUS at 02:07

## 2020-07-06 RX ADMIN — HYDROMORPHONE HYDROCHLORIDE 0.4 MG: 2 INJECTION, SOLUTION INTRAMUSCULAR; INTRAVENOUS; SUBCUTANEOUS at 07:07

## 2020-07-06 RX ADMIN — KETOROLAC TROMETHAMINE 30 MG: 30 INJECTION, SOLUTION INTRAMUSCULAR; INTRAVENOUS at 06:07

## 2020-07-06 RX ADMIN — ALBUMIN (HUMAN): 2.5 SOLUTION INTRAVENOUS at 05:07

## 2020-07-06 RX ADMIN — PREGABALIN 50 MG: 50 CAPSULE ORAL at 09:07

## 2020-07-06 RX ADMIN — DEXTROSE, SODIUM CHLORIDE, AND POTASSIUM CHLORIDE: 5; .45; .15 INJECTION INTRAVENOUS at 09:07

## 2020-07-06 RX ADMIN — ONDANSETRON 4 MG: 2 INJECTION INTRAMUSCULAR; INTRAVENOUS at 06:07

## 2020-07-06 RX ADMIN — NEOSTIGMINE METHYLSULFATE 5 MG: 1 INJECTION INTRAVENOUS at 06:07

## 2020-07-06 RX ADMIN — ACETAMINOPHEN 1000 MG: 500 TABLET, FILM COATED ORAL at 09:07

## 2020-07-06 RX ADMIN — GLYCOPYRROLATE 0.8 MG: 0.2 INJECTION, SOLUTION INTRAMUSCULAR; INTRAVENOUS at 07:07

## 2020-07-06 RX ADMIN — FAMOTIDINE 20 MG: 20 TABLET, FILM COATED ORAL at 09:07

## 2020-07-06 RX ADMIN — CEFAZOLIN 2 G: 330 INJECTION, POWDER, FOR SOLUTION INTRAMUSCULAR; INTRAVENOUS at 02:07

## 2020-07-06 RX ADMIN — HEPARIN SODIUM 5000 UNITS: 5000 INJECTION, SOLUTION INTRAVENOUS; SUBCUTANEOUS at 09:07

## 2020-07-06 RX ADMIN — DOCUSATE SODIUM 200 MG: 100 CAPSULE, LIQUID FILLED ORAL at 09:07

## 2020-07-06 RX ADMIN — ROCURONIUM BROMIDE 20 MG: 10 SOLUTION INTRAVENOUS at 05:07

## 2020-07-06 RX ADMIN — FENTANYL CITRATE 50 MCG: 50 INJECTION, SOLUTION INTRAMUSCULAR; INTRAVENOUS at 03:07

## 2020-07-06 RX ADMIN — DEXAMETHASONE SODIUM PHOSPHATE 8 MG: 4 INJECTION, SOLUTION INTRAMUSCULAR; INTRAVENOUS at 06:07

## 2020-07-06 RX ADMIN — EPHEDRINE SULFATE 15 MG: 50 INJECTION INTRAMUSCULAR; INTRAVENOUS; SUBCUTANEOUS at 02:07

## 2020-07-06 RX ADMIN — NEOSTIGMINE METHYLSULFATE 5 MG: 1 INJECTION INTRAVENOUS at 07:07

## 2020-07-06 RX ADMIN — GLYCOPYRROLATE 0.8 MG: 0.2 INJECTION, SOLUTION INTRAMUSCULAR; INTRAVENOUS at 06:07

## 2020-07-06 RX ADMIN — FENTANYL CITRATE 100 MCG: 50 INJECTION, SOLUTION INTRAMUSCULAR; INTRAVENOUS at 02:07

## 2020-07-06 RX ADMIN — ALBUMIN (HUMAN): 2.5 SOLUTION INTRAVENOUS at 04:07

## 2020-07-06 RX ADMIN — SODIUM CHLORIDE, SODIUM LACTATE, POTASSIUM CHLORIDE, AND CALCIUM CHLORIDE: 600; 310; 30; 20 INJECTION, SOLUTION INTRAVENOUS at 02:07

## 2020-07-06 RX ADMIN — EPHEDRINE SULFATE 10 MG: 50 INJECTION INTRAMUSCULAR; INTRAVENOUS; SUBCUTANEOUS at 03:07

## 2020-07-06 RX ADMIN — MIDAZOLAM HYDROCHLORIDE 2 MG: 1 INJECTION, SOLUTION INTRAMUSCULAR; INTRAVENOUS at 02:07

## 2020-07-06 RX ADMIN — LIDOCAINE HYDROCHLORIDE 25 MG: 20 INJECTION, SOLUTION INTRAVENOUS at 02:07

## 2020-07-06 RX ADMIN — ROCURONIUM BROMIDE 50 MG: 10 SOLUTION INTRAVENOUS at 02:07

## 2020-07-06 RX ADMIN — MUPIROCIN: 20 OINTMENT TOPICAL at 09:07

## 2020-07-06 NOTE — ANESTHESIA PROCEDURE NOTES
Intubation  Performed by: Karo Abraham CRNA  Authorized by: Ethan Hawkins MD     Intubation:     Induction:  Inhalational - mask    Intubated:  Postinduction    Mask Ventilation:  Easy mask    Attempts:  1    Attempted By:  CRNA    Method of Intubation:  Video laryngoscopy    Blade:  Conrado 3    Laryngeal View Grade: Grade I - full view of chords      Difficult Airway Encountered?: No      Complications:  None    Airway Device:  Oral endotracheal tube    Airway Device Size:  7.5    Style/Cuff Inflation:  Cuffed    Inflation Amount (mL):  5    Tube secured:  22    Secured at:  The lips    Placement Verified By:  Capnometry    Complicating Factors:  Small mouth, obesity and short neck    Findings Post-Intubation:  BS equal bilateral

## 2020-07-06 NOTE — INTERVAL H&P NOTE
The patient has been examined and the H&P has been reviewed:    I concur with the findings and no changes have occurred since H&P was written.    Anesthesia/Surgery risks, benefits and alternative options discussed and understood by patient/family.          Active Hospital Problems    Diagnosis  POA    Uterovaginal prolapse [N81.4]  Yes      Resolved Hospital Problems   No resolved problems to display.

## 2020-07-07 VITALS
SYSTOLIC BLOOD PRESSURE: 102 MMHG | RESPIRATION RATE: 17 BRPM | WEIGHT: 165 LBS | BODY MASS INDEX: 28.32 KG/M2 | OXYGEN SATURATION: 97 % | HEART RATE: 62 BPM | TEMPERATURE: 99 F | DIASTOLIC BLOOD PRESSURE: 65 MMHG

## 2020-07-07 LAB
BASOPHILS # BLD AUTO: 0.02 K/UL (ref 0–0.2)
BASOPHILS NFR BLD: 0.2 % (ref 0–1.9)
DIFFERENTIAL METHOD: ABNORMAL
EOSINOPHIL # BLD AUTO: 0 K/UL (ref 0–0.5)
EOSINOPHIL NFR BLD: 0 % (ref 0–8)
ERYTHROCYTE [DISTWIDTH] IN BLOOD BY AUTOMATED COUNT: 12.2 % (ref 11.5–14.5)
HCT VFR BLD AUTO: 44.4 % (ref 37–48.5)
HGB BLD-MCNC: 14.4 G/DL (ref 12–16)
IMM GRANULOCYTES # BLD AUTO: 0.06 K/UL (ref 0–0.04)
IMM GRANULOCYTES NFR BLD AUTO: 0.5 % (ref 0–0.5)
LYMPHOCYTES # BLD AUTO: 0.7 K/UL (ref 1–4.8)
LYMPHOCYTES NFR BLD: 5.6 % (ref 18–48)
MCH RBC QN AUTO: 29.1 PG (ref 27–31)
MCHC RBC AUTO-ENTMCNC: 32.4 G/DL (ref 32–36)
MCV RBC AUTO: 90 FL (ref 82–98)
MONOCYTES # BLD AUTO: 0.4 K/UL (ref 0.3–1)
MONOCYTES NFR BLD: 3.3 % (ref 4–15)
NEUTROPHILS # BLD AUTO: 11.5 K/UL (ref 1.8–7.7)
NEUTROPHILS NFR BLD: 90.4 % (ref 38–73)
NRBC BLD-RTO: 0 /100 WBC
PLATELET # BLD AUTO: 211 K/UL (ref 150–350)
PMV BLD AUTO: 11.5 FL (ref 9.2–12.9)
RBC # BLD AUTO: 4.94 M/UL (ref 4–5.4)
WBC # BLD AUTO: 12.75 K/UL (ref 3.9–12.7)

## 2020-07-07 PROCEDURE — 63600175 PHARM REV CODE 636 W HCPCS: Performed by: PHYSICIAN ASSISTANT

## 2020-07-07 PROCEDURE — 85025 COMPLETE CBC W/AUTO DIFF WBC: CPT

## 2020-07-07 PROCEDURE — 36415 COLL VENOUS BLD VENIPUNCTURE: CPT

## 2020-07-07 PROCEDURE — 25000003 PHARM REV CODE 250: Performed by: PHYSICIAN ASSISTANT

## 2020-07-07 RX ORDER — DOCUSATE SODIUM 100 MG/1
100 CAPSULE, LIQUID FILLED ORAL 2 TIMES DAILY
Qty: 30 CAPSULE | Refills: 2 | Status: SHIPPED | OUTPATIENT
Start: 2020-07-07 | End: 2021-07-07

## 2020-07-07 RX ORDER — IBUPROFEN 600 MG/1
600 TABLET ORAL EVERY 6 HOURS
Status: DISCONTINUED | OUTPATIENT
Start: 2020-07-07 | End: 2020-07-07 | Stop reason: HOSPADM

## 2020-07-07 RX ORDER — OXYCODONE AND ACETAMINOPHEN 5; 325 MG/1; MG/1
1 TABLET ORAL EVERY 4 HOURS PRN
Qty: 20 TABLET | Refills: 0 | Status: SHIPPED | OUTPATIENT
Start: 2020-07-07 | End: 2021-07-07

## 2020-07-07 RX ORDER — IBUPROFEN 600 MG/1
600 TABLET ORAL EVERY 6 HOURS PRN
Qty: 60 TABLET | Refills: 1 | Status: SHIPPED | OUTPATIENT
Start: 2020-07-07 | End: 2021-07-07

## 2020-07-07 RX ADMIN — OXYCODONE HYDROCHLORIDE AND ACETAMINOPHEN 1 TABLET: 5; 325 TABLET ORAL at 04:07

## 2020-07-07 RX ADMIN — DEXTROSE, SODIUM CHLORIDE, AND POTASSIUM CHLORIDE: 5; .45; .15 INJECTION INTRAVENOUS at 05:07

## 2020-07-07 RX ADMIN — OXYCODONE HYDROCHLORIDE AND ACETAMINOPHEN 1 TABLET: 5; 325 TABLET ORAL at 09:07

## 2020-07-07 RX ADMIN — OXYCODONE HYDROCHLORIDE AND ACETAMINOPHEN 1 TABLET: 5; 325 TABLET ORAL at 12:07

## 2020-07-07 RX ADMIN — HEPARIN SODIUM 5000 UNITS: 5000 INJECTION, SOLUTION INTRAVENOUS; SUBCUTANEOUS at 05:07

## 2020-07-07 RX ADMIN — DOCUSATE SODIUM 200 MG: 100 CAPSULE, LIQUID FILLED ORAL at 08:07

## 2020-07-07 NOTE — NURSING
Discharge instructions given to pt. Patient verbalized understanding.  VSS,  IV removed. Medications delivered to bedside.  Will follow up in 2 weeks with GYN.  Wheeled out by RN.

## 2020-07-07 NOTE — ANESTHESIA POSTPROCEDURE EVALUATION
Anesthesia Post Evaluation    Patient: Mari Ambrocio    Procedure(s) Performed: Procedure(s) (LRB):  ROBOTIC HYSTERECTOMY (N/A)  ROBOTIC SACROCOLPOPEXY, ABDOMEN (N/A)  SALPINGO-OOPHORECTOMY, LAPAROSCOPIC (N/A)  SLING, MIDURETHRAL (N/A)  CYSTOSCOPY (N/A)    Final Anesthesia Type: general    Patient location during evaluation: PACU  Patient participation: Yes- Able to Participate  Level of consciousness: awake and alert  Post-procedure vital signs: reviewed and stable  Pain management: adequate  Airway patency: patent    PONV status at discharge: No PONV  Anesthetic complications: no      Cardiovascular status: blood pressure returned to baseline  Respiratory status: unassisted and spontaneous ventilation  Hydration status: euvolemic  Follow-up not needed.          Vitals Value Taken Time   /70 07/06/20 1938   Temp 36.9 °C (98.4 °F) 07/06/20 1919   Pulse 72 07/06/20 1949   Resp 16 07/06/20 1941   SpO2 98 % 07/06/20 1949   Vitals shown include unvalidated device data.      No case tracking events are documented in the log.      Pain/Kristina Score: Pain Rating Prior to Med Admin: 7 (7/6/2020  7:41 PM)  Kristina Score: 8 (7/6/2020  7:19 PM)

## 2020-07-07 NOTE — OR NURSING
Report by phone to Charisse VARELA. Daughter Rosaline updated by phone. Pt without change from previous assessment,. prepared for transfer to Mother Baby unit.

## 2020-07-07 NOTE — OR NURSING
Pt states pain tolerable. Sleepy. No change from previous assessment. prpepared for transfer to inpt room 616

## 2020-07-07 NOTE — OP NOTE
Title of Operation:  1)  Robotic-assisted total laparoscopic hysterectomy with bilateral salpingo-oophorectomy  2)  Robotic-assisted sacral colpopexy with polypropylene mesh  3)  Placement of retropubic tension-free midurethral sling, Advantage Fit ([employer] Properties)  4)  Cystourethroscopy    Indications for Surgery:    Ohs Peq [id number]      Question 3/21/2020 10:43 AM CDT - Filed by Patient on 3/21/2020   Do you...     Usually experience pressure in the lower abdomen? Symptoms not present   Usually experience heaviness or dullness in the pelvic area? Symptoms not present   Usually have a bulge or something falling out that you can see or feel in your vaginal area? Symptoms present and they bother me somewhat; present slightly past introitus; mostly noted when wiping; no major bother symptoms--has been doing Kegels a lot, which is helping   Ever have to push on the vagina or around the rectum to complete a bowel movement? Symptoms not present   Usually experience a feeling of incomplete bladder emptying? Symptoms not present   Ever have to push up on a bulge in the vaginal area with your fingers to start or complete urination? Symptoms not present   Do you...     Feel you need to strain too hard to have a bowel movement? Symptoms not present   Feel you have not completely emptied your bowels at the end of a bowel movement?  Symptoms not present   Usually lose stool beyond your control if your stool is well formed? Symptoms not present   Usually lose stool beyond your control if your stool is loose? Symptoms not present   Usually lose gas from your rectum beyond your control? Symptoms not present   Usually have pain when you pass your stool? Symptoms not present   Experience a strong sense of urgency and have to rush to the bathroom to have a bowel movement? Symptoms not present   Does part of your bowel ever pass through the rectum and bulge outside during or after a bowel movement? Symptoms not present   Do you...       Usually experience frequent urination? Symptoms not present   Usually experience urine leakage associated with a feeling of urgency, that is, a strong sensation of needing to go to the bathroom? Symptoms not present   Usually experience urine leakage related to coughing, sneezing or laughing? Symptoms not present   Usually experience small amounts of urine leakage (that is, drops)? Symptoms present but they do not bother me at all   Usually experience difficulty emptying your bladder? Symptoms present but they do not bother me at all; PV to help   Usually experience pain or discomfort in the lower abdomen or genital region? Symptoms not present   POPDI  (range: 0 - 100) 8.33   CRADI (range: 0 - 100) 0   KEYANA (range: 0 - 100) 8.33   TOTAL SCORE  (range: 0 - 300) 16.66   Ohs Peq Urogyn Hpi      Question 3/21/2020 10:56 AM CDT - Filed by Patient on 3/21/2020   General Urogynecology: Are you experiencing the following?     Dysuria (painful urination) No   Nocturia:  waking up at night to empty your bladder  Yes   If you answered yes to the previous question, how many times does this happen per night? 1   Enuresis (urine loss during sleep) No   Dribbling urine after you urinate No   Hematuria (urine appears red) Yes   Type of stream Strong   Urinary frequency: How often a day are you going to the bathroom per day?  Less than 10; Q4h   Urinary Tract Infections: How many Urinary Tract Infections have you had in the past year? I have not had a UTI in the past year   If you have had a UTI in the past year, what treatments have you had so far?  I have not had a UTI in the past year   Urinary Incontinence (General): Are you experiencing the following?     Past consultation for incontinence: Have you ever seen someone for the evaluation of incontinence? No   If you answered yes to the previous question, please select all the therapies you have tried.  N/a- I answered no to the previous question   Please note the  "effectiveness of the therapies.     Need to wear protection to keep clothes dry  No   If you answered yes to the previous question, please mathew the protection you use.  None   If you wear protection, how much wetness is typically on each pad? N/a- I do not wear protection   If you wear protection, how often do you have to change per day, if applicable?  0   Stress Symptoms: Are you experiencing the following?     Leakage of urine with cough, laugh and/or sneeze No   If you answered yes to the previous question, what is the frequency in days, weeks and/or months? Never   Leakage of urine with sex No   Leakage of urine with bending/ lifting No   Leakage of urine with briskly walking or jogging No   If you lose urine for any other reason not previously mentioned, please note it below, if applicable.      Urge Symptoms: Are you experiencing the following?     Urgency ("got to go" feeling) No   Urge: How frequently do you feel an urge to urinate (feeling like you "gotta go" to the bathroom and can't wait) Never   Do you experience a leakage of urine when you have a feeling of urgency?  No   Leakage of urine when unaware No   Past use of anticholinergics (medications used to treat overactive bladder) No   If you answered yes to the previous question, please mathew the anticholinergics you have used:      Have you ever used Mirbetriq (aka Mirabegron)?  No   Prolapse Symptoms: Are you experiencing any of the following?      Falling out/ Bulging/ Heaviness in the vagina Yes   Vaginal/ Abdominal Pain/ Pressure No   Need to strain/ Push to void No   Need to wait on the toilet before you void Yes   Unusual position to urinate (using your hands to push back the vaginal bulge) Yes   Sensation of incomplete emptying No   Past use of pessary device No   If you answered yes to the previous question, please list the devices you have used below.      Bowel Symptoms: Are you experiencing any of the following?     Constipation No "   Diarrhea  No   Hematochezia (bloody stool) No   Incomplete evacuation of stool No   Involuntary loss of formed stool No   Fecal smearing/urgency No   Involuntary loss of gas No   Vaginal Symptoms: Are you experiencing any of the following?      Abnormal vaginal bleeding  Yes: see below   Vaginal dryness No   Sexually active  Yes   Dyspareunia (painful intercourse) No   Estrogen use  No   Ohs Peq Pelvic Pain Urogyn      Question 3/21/2020 10:56 AM CDT - Filed by Patient on 3/21/2020   Are you experiencing pelvic pain?  No      2)   bleeding/spotting:  --x 1 in 12/2019; mild spotting; pre-coital spotting x 1  --told PCP  --pelvic US 2/21/2020:  Uterus:  Size: 6.8 x 2.9 x 5.1 cm  Masses: There is a small uterine fibroid, intramural, less than 1 cm.  Endometrium: Thickened in this post menopausal patient, measuring 6 mm.  There is a an oval isoechoic mass within the cervix measuring 0.6 x 0.6 x 0.7 cm with smooth contour.  The bilateral ovaries were not seen.  The bilateral adnexa appear normal.  Free Fluid:  Trace of free fluid with septation.     03/25/2020  embx  SMALL FRAGMENTS OF INACTIVE ENDOMETRIUM AND BLOOD CLOT  Cervical polyp  BENIGN INFLAMED ENDOCERVICAL POLYP     [facility]  06/10/2020     Urine dipstick: neg.     1.  VOIDING PHASE:       a.  Uroflowmetry:  · Prolapse reduction: No  · Voided volume:  578 mL   · Voiding time:   43 seconds  · Max flow:  42 mL/s  · Avg flow:   13 mL/s   · PVR:   50 mL     The overall configuration of this uroflow study was normal.       b.  Pressure flow:  · Prolapse reduction: No  · Voided volume:   628 mL  · Voiding time:  70 seconds  · Peak flow:  49 mL/s   · Avg flow:  11 mL/s  · Max det pressure:  62  cm H20  · Det pressure at max flow: 22 cm H20  · Void initiated by detrusor contraction.    · Urethral relaxation (EMG):  absent.    · PVR (calculated):  0 mL     The overall configuration of this pressure flow study was prolonged but normal.       2.  FILLING  PHASE:  · 1st desire: 96 mL  · Normal desire:  257 mL  · Strong desire:  296 mL  · Urgency:  413 mL  · Compliance (calculated)  approx 150 mL/cm H20  · EMG activity during filling:  stable  · Detrusor contractions observed: No.       3.  URETHRAL FUNCTION/STORAGE PHASE:     a.  WITH prolapse reduction:  · CLPP (150 mL): Negative  at  169 cm H20  · VLPP (150 mL): Negative  at  111 cm H20  · CLPP (300 mL): Negative  at  167 cm H20  · VLPP (300 mL): Negative  at  89 cm H20   · CLPP (MAX ):    Negative  at  147 cm H20  · VLPP (MAX):     Negative  at  89 cm H20  · POS CLPP at max cap once pves catheter removed.      These findings are consistent with Positive urodynamic stress incontinence with cough at max cap once pves catheter removed.     Assessment:  UF normal.  PF prolonged but normal.  Compliance normal.  Max capacity 628 mL.  DO (--).  PROMISE (+).        Cysto  Assessment: Essentially normal cystourethroscopy with mild decrease in urethral coaptation.      Preoperative Diagnosis:  1. Postmenopausal bleeding    2. Cystocele with prolapse    3. Abnormal pelvic ultrasound    4. Rectocele, female    5. Uterovaginal prolapse    6. Cervical polyp    7. Urge urinary incontinence    8.     Urodynamic stress incontinence  9.     Decreased urethral coaptation, mild    Postoperative Diagnosis:  1. Postmenopausal bleeding    2. Cystocele with prolapse    3. Abnormal pelvic ultrasound    4. Rectocele, female    5. Uterovaginal prolapse    6. Cervical polyp    7. Urge urinary incontinence    8.     Urodynamic stress incontinence  9.     Decreased urethral coaptation, mild    Anesthesia:  General endotracheal anesthesia.  Additionally, 0.5% marcaine was injected prior to placement of each laparoscopic trocar, and a dilute solution of 1% lidocaine with epinephrine was injected vaginally for local anesthesia.    Specimen (Bacteriological, Pathological or other):  Uterus and cervix  Bilateral fallopian tubes and ovaries    Prosthetic  Device/Implant:  1)  Gynecare gynemesh (10 x 15 cm).  LOT# [id number].    2)  Interceed.     3)  Advantage Fit sling.  LOT# [id number].      Surgeons Narrative:    Surgeon: Corey Riley MD    Assistants:  Berta Garcia MD ([id number]).  RUFUS Bates (insufficient resident assistance).      Intravenous Fluids:  1900 mL     Estimated Blood Loss:  100 mL     Urine Output:  325 mL     Counts:  Sponge, lap, needle counts correct x 2.     Drains: Reed catheter.     Disposition:  The patient was sent to the PACU in stable condition.     Findings:     1.  On exam under anesthesia,  normal external female genitalia. There was prolapse as previously noted in clinic.  Uterus and cervix: Normal size  Adnexa: non palpable     3.  On laparoscopic survey:    --The bowel was grossly Normal.    --The appendix was not visualized.   --The uterus and cervix, bilateral fallopian tubes and ovaries were present and Normal.   --The liver margin Normal.   --The gall bladder was present and Normal.   --Bilateral ureters were visualized and noted to vermiculate at the start and close of the case.    --Adhesions were absent.    --Other findings included:  none     3.  On cystoscopy, the bladder mucosa was normal.  After RALTH/BSO/ASC and MUS, there was no suture or mesh within the bladder mucosa.  The ureteral orifices were visualized bilaterally with (+) noted good efflux x 2. On a systematic survey of the bladder dome to the base of the urethrovesical junction, there were not other abnormalities noted. The urethra was normal on retraction of the scope.    4.  Rectal exam:  At the close of the case, rectal exam was performed.  There was no suture, mesh, or other injury noted on exam.  No obvious masses were palpated.     Description of procedure:    The patient was identified in the preoperative area where informed consent was confirmed, and she was taken to the operating room where an adequate level of general anesthesia was obtained.  The  patient was positioned in lithotomy position with legs in Nic stirrups. Care was taken to avoid joint hyperflexion or hyperextension, and all extremity surfaces were carefully padded so as to minimize risk of neurologic injury. Intravenous antibiotics were administered preoperatively. Sequential compression devices were applied to the patient's lower extremities preoperatively and heparin was administered subcutaneously for VTE prophylaxis.  Surgical time-out was performed, where the patient was identified and procedures confirmed.  An examination under anesthesia was performed with findings described as above.  The patient's abdomen, perineum, and vagina were sterilely prepped and draped. A hope catheter was placed in the bladder for drainage.      A weighted speculum was placed in the vagina.  The cervix was identified, the posterior lip was grasped with a single toothed tenaculum, and stay sutures of 0-vicryl were placed in the anterior and posterior cervix.  The uterus was sounded, and the appropriate uterine manipulator and KOH colpotomizer were selected.  The GLO/KOH apparatus was assembled, and the uterine manipulator was advanced into the uterine cavity until the KOH colpotomizer was secured optimally around the cervix.  The uterine manipulator was inflated to secure the device, and a vaginal occluder balloon was placed distally to the GLO/KOH and inflated until the vaginal cavity was occluded.      First, we placed laparoscopic ports. Before placement of each laparoscopic port, several mL of 0.5% Marcaine were injected subcutaneously as well as deeply into the tissue of each site.  First, an infraumbilical incision of 5-mm was made, and the 5 mm trocar was inserted into the incision until peritoneal entry was gained and confirmed. Carbon dioxide was insufflated through the port until an adequate pneumoperitoneum of no greater than 15 mm Hg was obtained.  The laparoscopic camera was inserted through  this port for visualization of all subsequent port placement.  Two 8 mm ports were place on bilaterally to the supraumbilical port, each approximately 8-10 cm lateral, along the mid clavicular line at the level of the umbilicus, at least 2 cm cephalad and medial of the anterior superior iliac spine. Each 8 mm trocar was inserted under direct visualization without significant trauma.   An 8 mm airseal port was placed in the right upper quadrant, superior to the umbilicus and midway between the right lower quadrant port and the umbilicus in a triangulated fashion. A third 8 mm port was placed at the left side, midway between the left lower quadrant port and the umbilicus.  The 5 mm umbilical camera port was then exchanged for a 8 mm robotic camera port.  There were no complications with these port placements. A total of 5 ports had been placed, including the supraumbilical port for the camera. The robot was then docked.      We began the case with total laparoscopic hysterectomy.  The ureters were visualized bilaterally.  Bilateral tubes and ovaries were visualized, noted to be normal, and left in place as the patient was perimenopausal with no risk factors.  Sequentially, the right utero-ovarian, round, and broad ligamens were electrodessicated with bipolar cautery.  The anterior and posterior sheaths of the round ligament were gently , the uterine vessels were exposed, and electrodessicated.  Using sharp dissection, the vesicouterine fold was created, and the bladder was carefully dissected off the anterior vagina.   Next, these same steps were repeated along the patient's left side.  Excellent hemostasis was noted.  Using the monopolar scarlett, the anterior colpotomy was created along the KOH device and considered circumferentially until the entire uterine specimen was freed from the pelvis.  The specimen was then handed to pathology for further evaluation.  The vaginal cuff was closed with several  figure-of-eight stitches of 0-vicryl.  Excellent hemostasis was noted.     We then proceeded with sacral colpopexy. We gently retracted the sigmoid colon to the patient's left so that we could easily visualize the sacral promontory.  The peritoneum was opened over the sacral  promontory, and the presacral space was developed.  Care was used to avoid any trauma to the vasculature or nerve supply underlying this space during this process, and excellent hemostasis was noted throughout. Peritoneal dissection was continued over the sacral promontory to provide adequate space for attachment of the GyneMesh. This process was aided with blunt dissection using Kittners. The peritoneum was then opened cadually all the way to the vaginal cuff.  With the EEA sizers in the vagina and in the rectum, the rectovaginal space was demonstrated. The peritoneum overlying this spaced was opened and the space bluntly dissected down to approximately the distal 1/3 of the vagina. Hemostasis was maintained with electrocautery. After retrograde filling the bladder to demonstrate the most cephalad portion of the bladder, the bladder was dissected off the vaginal cuff and anterior vagina.  Again, excellent hemostasis was noted.    Two pieces of Gynemesh each about 4 cm in width x 15 cm in length were cut. These were broadly affixed to the vagina anteriorly and posteriorly with several rows of interrupted sutures of 2-0 PDS, boom 6 sutures on the posterior vagina and 6 sutures on the anterior vagina. With the EEA sizer deviating the vagina to the right pararectal space, the pieces of mesh were tensioned appropriately and sutured to the anterior ligament overlying the presacral space at boom the level of S1 and S2 using 2 x 0-Ethibond sutures. Care was taken to make sure that the mesh was not placed under tension. Excellent hemostasis was noted during this process.  Care was used to avoid trauma to the presacral vasculature during this step. Excess  mesh was removed. Interrupted stitches of 0-Vicryl was used to close the peritoneum over the sacral promontory and along the entire extent of the GyneMesh to the vaginal cuff.  Excellent hemostasis was noted at the end of the procedure. All needles and suture pieces were removed from the pelvis laparoscopically throughout the procedure.  Needles, sponge, and lap counts were correct x 2 at the end of the procedure. At the end of the robotic portion of the case, another pelvic survey was completed.  The pelvis was irrigated, all irrigants removed. All operative areas were noted to be hemostatic.      Next, cystourethroscopy was performed.  Findings were as noted above. There were  no other abnormalities noted.  Ureteral orifices were noted to have good efflux bilaterally.  The bladder was drained, and a Reed catheter was replaced.    At this point, robotic/laparoscopic portion of the procedure was completed. The pelvis was irrigated, and all irrigants were removed. Interceed was placed along all suture and planes of dissection to discourage future adhesion formation.  All incisions were <1 cm, and fascial closure was unnecessary. The abdomen was deflated and all laparoscopic sleeves and other instruments were removed from the abdomen. All laparoscopic skin incisions were closed with subcuticular stitches of 4-0 Monocryl and secured with steri-strips and band-aids.  Excellent cosmesis and hemostasis was noted.             We then turned our attention vaginally and proceeded with placement of the Advantage Fit midurethral sling. The skin was marked just above the symphysis pubis, 2 cm laterally on either side of the midline indicating the exit sites for the trocars.  The Reed catheter was palpated at the urethrovesical junction, and 2 Allis clamps were placed at the anterior vaginal wall along the midurethra. The Reed catheter was removed from the patient's bladder. The vaginal epithelium between the two Allis clamps  was injected with the 1% lidocaine with epinephrine.  Metzenbaum scissors were used to dissect the vaginal epithelium off the underlying pubocervical muscular tissue in the direction of the angle formed between the ischiopubic ramus and the pubic bone bilaterally. The rigid catheter guide was used to deviate the bladder neck and urethra to the patient's left while the right trocar was advanced to the dissected tract in the patient's right side.  Once the urogenital membrane was perforated, the trocar and handle were reoriented in the sagittal plane and the tip of the trocar was advanced through the retropubic space in intimate proximity to the pubic bone.  The trocar was then advanced through the skin approximately 2 cm to the right of the midline just above the pubic symphysis. The passage of the Advantage Fit trocar was repeated on the patient's left hand side in a similar fashion, using the catheter guide to deviate the bladder neck to the right.  At this point, cystourethroscopy was performed. Cystoscopy was negative for injury after infusion of at least 300 mL in the bladder.  The ureteral orifices were noted to have good efflux bilaterally.  The bladder was then drained. With a #10 Hegar dilator placed between the sling mesh and the urethra, the arms of the sling were elevated above the pubic symphysis and the plastic sheaths were removed from the mesh arms.  Excess mesh was trimmed beneath the suprapubic skin.  The Hegar dilator ensured that the mesh sling was placed in a tension-free manner.  The vaginal mucosa overlying the sling mesh was closed with a several mattress stitches of 2-0 Vicryl with excellent hemostasis noted.  The suprapubic incisions were closed with dermabond.    At this point, excellent support of all vaginal walls was noted, and no further repairs were needed.  Rectal exam was normal as above noted.      At the close of the case, all counts were correct x2.  The vagina was irrigated, and  all irrigants were removed.  The vagina was packed with a role of Kerlix, coated with Premarin cream for assurance of immediate postop hemostasis.  The Reed was in place and draining well.  The patient was awakened from general endotracheal anesthesia and was taken to the Recovery Room in stable condition.  She tolerated the procedure well.    I was present and scrubbed for the entire procedure.

## 2020-07-07 NOTE — DISCHARGE SUMMARY
[facility]  Obstetrics & Gynecology  Discharge Summary    Patient Name: Mari Ambrocio  MRN: [id number]  Admission Date: 7/6/2020  Hospital Length of Stay: 0 days  Discharge Date and Time:  07/07/2020 1:02 PM  Attending Physician: No att. providers found   Discharging Provider: Melba Garcia MD  Primary Care Provider: Silvio Mckeon MD    HPI:   Patient presented for scheduled procedure. Patient was passed back to OR for below scheduled procedure. Please see OP note for further details. Tolerated procedure well and patient was taken to recovery in a stable condition. Prior to discharge patient was able to void, ambulate, tolerate PO and pain was well controlled with PO meds. Patient was given routine post-op instructions for which patient voiced understanding. Patient was subsequently discharged home.        Procedure(s) (LRB):  ROBOTIC HYSTERECTOMY (N/A)  ROBOTIC SACROCOLPOPEXY, ABDOMEN (N/A)  SALPINGO-OOPHORECTOMY, LAPAROSCOPIC (N/A)  SLING, MIDURETHRAL (N/A)  CYSTOSCOPY (N/A)     Consults:     Significant Diagnostic Studies: Labs:   CBC   Recent Labs   Lab 07/07/20  0336   WBC 12.75*   HGB 14.4   HCT 44.4          Pending Diagnostic Studies:     Procedure Component Value Units Date/Time    Specimen to Pathology, Surgery Gynecology and Obstetrics [[id number]] Collected: 07/06/20 1830    Order Status: Sent Lab Status: In process Updated: 07/07/20 0802        Final Active Diagnoses:    Diagnosis Date Noted POA    Uterovaginal prolapse [N81.4] 03/25/2020 Yes      Problems Resolved During this Admission:        Discharged Condition: good    Disposition: Home or Self Care    Follow Up:  Follow-up Information     Corey Riley MD In 2 weeks.    Specialties: Gynecology, Urology  Why: post op visit  Contact information:  [address] [place] [address] [phone]                 Patient Instructions:      Pelvic Rest     Notify your health care provider if you  experience any of the following:  temperature >100.4     Notify your health care provider if you experience any of the following:  persistent nausea and vomiting or diarrhea     Notify your health care provider if you experience any of the following:  severe uncontrolled pain     Notify your health care provider if you experience any of the following:  redness, tenderness, or signs of infection (pain, swelling, redness, odor or green/yellow discharge around incision site)     Notify your health care provider if you experience any of the following:  difficulty breathing or increased cough     Notify your health care provider if you experience any of the following:  severe persistent headache     Notify your health care provider if you experience any of the following:  persistent dizziness, light-headedness, or visual disturbances     Notify your health care provider if you experience any of the following:  increased confusion or weakness     Notify your health care provider if you experience any of the following:   Order Comments: Heavy vaginal bleeding >1 pad/hour for greater than 2 hours     Activity as tolerated     Medications:  Reconciled Home Medications:      Medication List      START taking these medications    ibuprofen 600 MG tablet  Commonly known as: ADVIL,MOTRIN  Take 1 tablet (600 mg total) by mouth every 6 (six) hours as needed for Pain.     oxyCODONE-acetaminophen 5-325 mg per tablet  Commonly known as: PERCOCET  Take 1 tablet by mouth every 4 (four) hours as needed for Pain.     STOOL SOFTENER 100 MG capsule  Generic drug: docusate sodium  Take 1 capsule (100 mg total) by mouth 2 (two) times daily.        CONTINUE taking these medications    varicella-zoster gE-AS01B (PF) 50 mcg/0.5 mL injection  Commonly known as: SHINGRIX (PF)  Inject 0.5 mLs into the muscle As instructed. 2 doses            Melba Garcia MD  Obstetrics & Gynecology  [facility]

## 2020-07-07 NOTE — PROGRESS NOTES
PM Assessment:    Patient is doing well this evening. Reports back pain; this and her post-op are being controlled by her PO pain medications. She was able to tolerate yue crackers post-op without n/v. She has not ambulated. Reed is in place with clear UOP. Pt reports she feels like she has to urinate. Denies vaginal bleeding. She is not passing flatus and has not had a BM. No other complaints. She had questions regarding her surgery; op note was reviewed with patient.    Temp:  [97.4 °F (36.3 °C)-98.6 °F (37 °C)] 97.4 °F (36.3 °C)  Pulse:  [66-83] 68  Resp:  [16] 16  SpO2:  [96 %-100 %] 99 %  BP: (123-153)/(70-83) 153/83    PE:  Gen: NAD  Abdomen: soft, appropriately tender, incisions c/d/i  MSK: SCDs in place    UOP: 400cc/3.5 hours, adequate    Labs:  None    A/P:  - continue routine post-op management    Sushma K. Reddy, MD  PGY-4, OBGYN

## 2020-07-07 NOTE — BRIEF OP NOTE
[facility]  Brief Op Note      Admit Date: 7/6/2020    Discharge Date: 07/06/2020    Attending Physician: Corey Riley MD     Surgery Date: 7/6/2020     Surgeon(s) and Role:     * Corey Riley MD - Primary     * Melba Garcia MD - Resident - Assisting        Pre-op Diagnosis:  Postmenopausal bleeding [N95.0]  Cystocele with prolapse [N81.4]  Uterovaginal prolapse [N81.4]  Rectocele, female [N81.6]  Mixed incontinence urge and stress [N39.46]    Post-op Diagnosis:  Post-Op Diagnosis Codes:     * Postmenopausal bleeding [N95.0]     * Cystocele with prolapse [N81.4]     * Uterovaginal prolapse [N81.4]     * Rectocele, female [N81.6]     * Mixed incontinence urge and stress [N39.46]    Procedure(s) (LRB):  ROBOTIC HYSTERECTOMY (N/A)  ROBOTIC SACROCOLPOPEXY, ABDOMEN (N/A)  SALPINGO-OOPHORECTOMY, LAPAROSCOPIC (N/A)  SLING, MIDURETHRAL (N/A)  CYSTOSCOPY (N/A)    Anesthesia: General    Findings/Key Components:   1. Pelvic organ prolapse evident on exam with descent to the hymenal ring   2. Robotic assisted hysterectomy performed without difficulty   3. Sacrocolpopexy with mesh then performed   4. Midurethral sling placed without difficult   5. Cystoscopy performed at procedure close confirming bilateral efflux of ureteral jets   6. No immediate intraoperative complications       Specimens:   Specimen (12h ago, onward)    None            Melba Garcia MD  OBGYN, PGY-3

## 2020-07-07 NOTE — PROGRESS NOTES
Progress Note  Gynecology    Admit Date: 2020  LOS: 0    Reason for Admission:  <principal problem not specified>    SUBJECTIVE:     Mari Ambrocio is a 60 y.o.  who is POD#1 s/p RA-TLH/BSO/sacrocolpopexy/MUS/cysto for the treatment of pelvic organ prolapse.    Pt doing well this morning. Pain is well controlled. She is tolerating a regular diet without N/V. Has not yet ambulated. Voiding without difficulty via hope. Passing flatus. She is not yet having bowel movements.    OBJECTIVE:     Vital Signs   Temp:  [97.4 °F (36.3 °C)-98.6 °F (37 °C)] 98 °F (36.7 °C)  Pulse:  [] 66  Resp:  [16-18] 16  SpO2:  [96 %-100 %] 96 %  BP: (114-153)/(58-83) 114/58      Intake/Output Summary (Last 24 hours) at 2020 0618  Last data filed at 2020 0503  Gross per 24 hour   Intake 2300 ml   Output 1575 ml   Net 725 ml       Physical Exam:  Gen: A&Ox3, NAD  CV: RRR  Pulm: LCTAB, normal respiratory effort  Abd: active bowel sounds, soft, non-distended, non-tender to palpation without rebound or guarding  Inc: robotic port sites in place with bandages overlying   Ext: PPP, no peripheral edema, TEDs/SCDs in place  : Hope in place draining clear yellow urine, packing removed     Laboratory:  Recent Results (from the past 24 hour(s))   COVID-19 Rapid Screening    Collection Time: 20  9:17 AM   Result Value Ref Range    SARS-CoV-2 RNA, Amplification, Qual Negative Negative   CBC auto differential    Collection Time: 20  3:36 AM   Result Value Ref Range    WBC 12.75 (H) 3.90 - 12.70 K/uL    RBC 4.94 4.00 - 5.40 M/uL    Hemoglobin 14.4 12.0 - 16.0 g/dL    Hematocrit 44.4 37.0 - 48.5 %    Mean Corpuscular Volume 90 82 - 98 fL    Mean Corpuscular Hemoglobin 29.1 27.0 - 31.0 pg    Mean Corpuscular Hemoglobin Conc 32.4 32.0 - 36.0 g/dL    RDW 12.2 11.5 - 14.5 %    Platelets 211 150 - 350 K/uL    MPV 11.5 9.2 - 12.9 fL    Immature Granulocytes 0.5 0.0 - 0.5 %    Gran # (ANC) 11.5 (H) 1.8 - 7.7 K/uL     Immature Grans (Abs) 0.06 (H) 0.00 - 0.04 K/uL    Lymph # 0.7 (L) 1.0 - 4.8 K/uL    Mono # 0.4 0.3 - 1.0 K/uL    Eos # 0.0 0.0 - 0.5 K/uL    Baso # 0.02 0.00 - 0.20 K/uL    nRBC 0 0 /100 WBC    Gran% 90.4 (H) 38.0 - 73.0 %    Lymph% 5.6 (L) 18.0 - 48.0 %    Mono% 3.3 (L) 4.0 - 15.0 %    Eosinophil% 0.0 0.0 - 8.0 %    Basophil% 0.2 0.0 - 1.9 %    Differential Method Automated        Diagnostic Results:  n/a    ASSESSMENT/PLAN:     Active Hospital Problems    Diagnosis  POA    Uterovaginal prolapse [N81.4]  Yes      Resolved Hospital Problems   No resolved problems to display.       Assessment: 60 y.o.  POD#1 s/p RA-TLH/BSO/sacrocolpopexy/MUS/cysto    Plan:   1. Post-op   - Routine post-op advances  - Continue PRN pain medications  - D/C hope and perform spontaneous voiding trial  - Encourage ambulation   - Encourage IS  - CBC stable 44  - UOP adequate   - Continue IVF until tolerating regular diet.  - Antiemetics prn nausea/vomiting.    2. H/o postoperative nausea/vomiting   - patient reports no nausea post operatively   - well controlled with antiemetics    Dispo: As patient meets appropriate post-op milestones, plan for discharge to home later today.     Melba Garcia MD  OBGYN, PGY-3

## 2020-07-07 NOTE — TRANSFER OF CARE
Anesthesia Transfer of Care Note    Patient: Mari Ambrocio    Procedure(s) Performed: Procedure(s) (LRB):  ROBOTIC HYSTERECTOMY (N/A)  ROBOTIC SACROCOLPOPEXY, ABDOMEN (N/A)  SALPINGO-OOPHORECTOMY, LAPAROSCOPIC (N/A)  SLING, MIDURETHRAL (N/A)  CYSTOSCOPY (N/A)    Patient location: PACU    Anesthesia Type: general    Transport from OR: Transported from OR on 2-3 L/min O2 by NC with adequate spontaneous ventilation    Post pain: adequate analgesia    Post assessment: no apparent anesthetic complications    Post vital signs: stable    Level of consciousness: responds to stimulation    Nausea/Vomiting: no nausea/vomiting    Complications: none    Transfer of care protocol was followed      Last vitals:   Visit Vitals  /71 (BP Location: Right arm, Patient Position: Lying)   Pulse 83   Temp 36.9 °C (98.4 °F) (Oral)   Resp 16   Wt 74.8 kg (165 lb)   LMP  (LMP Unknown)   SpO2 99%   Breastfeeding No   BMI 28.32 kg/m²

## 2020-07-09 LAB
FINAL PATHOLOGIC DIAGNOSIS: NORMAL
GROSS: NORMAL

## 2020-07-13 ENCOUNTER — TELEPHONE (OUTPATIENT)
Dept: UROGYNECOLOGY | Facility: CLINIC | Age: 60
End: 2020-07-13

## 2020-07-13 NOTE — TELEPHONE ENCOUNTER
Spoke with patient.  Patient reports that overall she is feeling fine.  Notified her that pathology from surgery is normal.  Patient acknowledged understanding.  Call ended

## 2020-07-13 NOTE — TELEPHONE ENCOUNTER
----- Message from Corey Riley MD sent at 7/12/2020  5:32 PM CDT -----  Please let patient know surgical pathology (uterus/cervix, bilateral tubes and ovaries) was normal. How is she doing since surgery? Thanks!

## 2020-07-15 NOTE — OPERATIVE NOTE ADDENDUM
Certification of Assistant at Surgery       Surgery Date: 7/6/2020     Participating Surgeons:  Surgeon(s) and Role:     * Corey Riley MD - Primary     * Melba Garcia MD - Resident - Assisting    Procedures:  Procedure(s) (LRB):  ROBOTIC HYSTERECTOMY (N/A)  ROBOTIC SACROCOLPOPEXY, ABDOMEN (N/A)  SALPINGO-OOPHORECTOMY, LAPAROSCOPIC (N/A)  SLING, MIDURETHRAL (N/A)  CYSTOSCOPY (N/A)    Assistant Surgeon's Certification of Necessity:  I understand that section 1842 (b) (6) (d) of the Social Security Act generally prohibits Medicare Part B reasonable charge payment for the services of assistants at surgery in teaching hospitals when qualified residents are available to furnish such services. I certify that the services for which payment is claimed were medically necessary, and that no qualified resident was available to perform the services. I further understand that these services are subject to post-payment review by the Medicare carrier.      Wai Simpson PA-C    07/15/2020  5:49 PM

## 2020-07-21 ENCOUNTER — TELEPHONE (OUTPATIENT)
Dept: UROGYNECOLOGY | Facility: CLINIC | Age: 60
End: 2020-07-21

## 2020-07-21 NOTE — TELEPHONE ENCOUNTER
Patient having nausea mostly in AM. Reports decreased appetite. She is able to tolerate solids and liquids.  Having normal bowel movements. Surgical pain has improved. Instructed to try pepcid otc 20 mg twice daily. She will call tomorrow if nausea is not improved. Mag Louis, FNP-BC

## 2020-08-20 ENCOUNTER — OFFICE VISIT (OUTPATIENT)
Dept: UROGYNECOLOGY | Facility: CLINIC | Age: 60
End: 2020-08-20
Payer: COMMERCIAL

## 2020-08-20 VITALS
SYSTOLIC BLOOD PRESSURE: 122 MMHG | WEIGHT: 168.19 LBS | DIASTOLIC BLOOD PRESSURE: 84 MMHG | BODY MASS INDEX: 28.71 KG/M2 | HEIGHT: 64 IN

## 2020-08-20 DIAGNOSIS — Z98.890 POST-OPERATIVE STATE: Primary | ICD-10-CM

## 2020-08-20 DIAGNOSIS — R35.0 URINARY FREQUENCY: ICD-10-CM

## 2020-08-20 DIAGNOSIS — M62.89 PELVIC FLOOR TENSION: ICD-10-CM

## 2020-08-20 DIAGNOSIS — N95.2 VAGINAL ATROPHY: ICD-10-CM

## 2020-08-20 DIAGNOSIS — N39.46 MIXED STRESS AND URGE URINARY INCONTINENCE: ICD-10-CM

## 2020-08-20 PROCEDURE — 99024 POSTOP FOLLOW-UP VISIT: CPT | Mod: S$GLB,,, | Performed by: NURSE PRACTITIONER

## 2020-08-20 PROCEDURE — 87086 URINE CULTURE/COLONY COUNT: CPT

## 2020-08-20 PROCEDURE — 99999 PR PBB SHADOW E&M-EST. PATIENT-LVL IV: CPT | Mod: PBBFAC,,, | Performed by: NURSE PRACTITIONER

## 2020-08-20 PROCEDURE — 99024 PR POST-OP FOLLOW-UP VISIT: ICD-10-PCS | Mod: S$GLB,,, | Performed by: NURSE PRACTITIONER

## 2020-08-20 PROCEDURE — 99999 PR PBB SHADOW E&M-EST. PATIENT-LVL IV: ICD-10-PCS | Mod: PBBFAC,,, | Performed by: NURSE PRACTITIONER

## 2020-08-20 RX ORDER — ESTRADIOL 0.1 MG/G
1 CREAM VAGINAL
Qty: 42.5 G | Refills: 3 | Status: SHIPPED | OUTPATIENT
Start: 2020-08-20 | End: 2020-11-02 | Stop reason: SDUPTHER

## 2020-08-20 NOTE — PATIENT INSTRUCTIONS
1. Post op healing well. No lifting > 50 pounds without assistance.  2. Urine culture today  3. Start pelvic floor PT with  [facility] (Mere Rodriguez): (p) [phone]. (f) [phone]. Established patients call:  [phone]. or  [facility] (Carmen Murdock or Jaymie Stark): (p) [phone].  Or [phone].   4. Start vaginal estrogen cream 1 gm twice weekly  5. Continue to control bowel movements  6. Patient will follow up with us in 4 months

## 2020-08-20 NOTE — PROGRESS NOTES
[facility] UROGYNECOLOGY-[id number]   [address] [id number]  August 20, 2020     Mari Ambrocio  [id number]  1960    UROGYN POST-OP  8/20/2020     Mari Ambrocio is a 60 y.o.  here for a post operative visit.    OPERATIVE NOTE  07/06/2020  Title of Operation:  1)  Robotic-assisted total laparoscopic hysterectomy with bilateral salpingo-oophorectomy  2)  Robotic-assisted sacral colpopexy with polypropylene mesh  3)  Placement of retropubic tension-free midurethral sling, Advantage Fit ([employer])  4)  Cystourethroscopy     Indications for Surgery:     Ohs Peq [id number]      Question 3/21/2020 10:43 AM CDT - Filed by Patient on 3/21/2020   Do you...     Usually experience pressure in the lower abdomen? Symptoms not present   Usually experience heaviness or dullness in the pelvic area? Symptoms not present   Usually have a bulge or something falling out that you can see or feel in your vaginal area? Symptoms present and they bother me somewhat; present slightly past introitus; mostly noted when wiping; no major bother symptoms--has been doing Kegels a lot, which is helping   Ever have to push on the vagina or around the rectum to complete a bowel movement? Symptoms not present   Usually experience a feeling of incomplete bladder emptying? Symptoms not present   Ever have to push up on a bulge in the vaginal area with your fingers to start or complete urination? Symptoms not present   Do you...     Feel you need to strain too hard to have a bowel movement? Symptoms not present   Feel you have not completely emptied your bowels at the end of a bowel movement?  Symptoms not present   Usually lose stool beyond your control if your stool is well formed? Symptoms not present   Usually lose stool beyond your control if your stool is loose? Symptoms not present   Usually lose gas from your rectum beyond your control? Symptoms not present   Usually have pain when you pass your stool?  Symptoms not present   Experience a strong sense of urgency and have to rush to the bathroom to have a bowel movement? Symptoms not present   Does part of your bowel ever pass through the rectum and bulge outside during or after a bowel movement? Symptoms not present   Do you...      Usually experience frequent urination? Symptoms not present   Usually experience urine leakage associated with a feeling of urgency, that is, a strong sensation of needing to go to the bathroom? Symptoms not present   Usually experience urine leakage related to coughing, sneezing or laughing? Symptoms not present   Usually experience small amounts of urine leakage (that is, drops)? Symptoms present but they do not bother me at all   Usually experience difficulty emptying your bladder? Symptoms present but they do not bother me at all; PV to help   Usually experience pain or discomfort in the lower abdomen or genital region? Symptoms not present   POPDI  (range: 0 - 100) 8.33   CRADI (range: 0 - 100) 0   KEYANA (range: 0 - 100) 8.33   TOTAL SCORE  (range: 0 - 300) 16.66   Ohs Peq Urogyn Hpi      Question 3/21/2020 10:56 AM CDT - Filed by Patient on 3/21/2020   General Urogynecology: Are you experiencing the following?     Dysuria (painful urination) No   Nocturia:  waking up at night to empty your bladder  Yes   If you answered yes to the previous question, how many times does this happen per night? 1   Enuresis (urine loss during sleep) No   Dribbling urine after you urinate No   Hematuria (urine appears red) Yes   Type of stream Strong   Urinary frequency: How often a day are you going to the bathroom per day?  Less than 10; Q4h   Urinary Tract Infections: How many Urinary Tract Infections have you had in the past year? I have not had a UTI in the past year   If you have had a UTI in the past year, what treatments have you had so far?  I have not had a UTI in the past year   Urinary Incontinence (General): Are you experiencing the  "following?     Past consultation for incontinence: Have you ever seen someone for the evaluation of incontinence? No   If you answered yes to the previous question, please select all the therapies you have tried.  N/a- I answered no to the previous question   Please note the effectiveness of the therapies.     Need to wear protection to keep clothes dry  No   If you answered yes to the previous question, please mathew the protection you use.  None   If you wear protection, how much wetness is typically on each pad? N/a- I do not wear protection   If you wear protection, how often do you have to change per day, if applicable?  0   Stress Symptoms: Are you experiencing the following?     Leakage of urine with cough, laugh and/or sneeze No   If you answered yes to the previous question, what is the frequency in days, weeks and/or months? Never   Leakage of urine with sex No   Leakage of urine with bending/ lifting No   Leakage of urine with briskly walking or jogging No   If you lose urine for any other reason not previously mentioned, please note it below, if applicable.      Urge Symptoms: Are you experiencing the following?     Urgency ("got to go" feeling) No   Urge: How frequently do you feel an urge to urinate (feeling like you "gotta go" to the bathroom and can't wait) Never   Do you experience a leakage of urine when you have a feeling of urgency?  No   Leakage of urine when unaware No   Past use of anticholinergics (medications used to treat overactive bladder) No   If you answered yes to the previous question, please mathew the anticholinergics you have used:      Have you ever used Mirbetriq (aka Mirabegron)?  No   Prolapse Symptoms: Are you experiencing any of the following?      Falling out/ Bulging/ Heaviness in the vagina Yes   Vaginal/ Abdominal Pain/ Pressure No   Need to strain/ Push to void No   Need to wait on the toilet before you void Yes   Unusual position to urinate (using your hands to push back " the vaginal bulge) Yes   Sensation of incomplete emptying No   Past use of pessary device No   If you answered yes to the previous question, please list the devices you have used below.      Bowel Symptoms: Are you experiencing any of the following?     Constipation No   Diarrhea  No   Hematochezia (bloody stool) No   Incomplete evacuation of stool No   Involuntary loss of formed stool No   Fecal smearing/urgency No   Involuntary loss of gas No   Vaginal Symptoms: Are you experiencing any of the following?      Abnormal vaginal bleeding  Yes: see below   Vaginal dryness No   Sexually active  Yes   Dyspareunia (painful intercourse) No   Estrogen use  No   Ohs Peq Pelvic Pain Urogyn      Question 3/21/2020 10:56 AM CDT - Filed by Patient on 3/21/2020   Are you experiencing pelvic pain?  No      2)   bleeding/spotting:  --x 1 in 12/2019; mild spotting; pre-coital spotting x 1  --told PCP  --pelvic US 2/21/2020:  Uterus:  Size: 6.8 x 2.9 x 5.1 cm  Masses: There is a small uterine fibroid, intramural, less than 1 cm.  Endometrium: Thickened in this post menopausal patient, measuring 6 mm.  There is a an oval isoechoic mass within the cervix measuring 0.6 x 0.6 x 0.7 cm with smooth contour.  The bilateral ovaries were not seen.  The bilateral adnexa appear normal.  Free Fluid:  Trace of free fluid with septation.     03/25/2020  embx  SMALL FRAGMENTS OF INACTIVE ENDOMETRIUM AND BLOOD CLOT  Cervical polyp  BENIGN INFLAMED ENDOCERVICAL POLYP     Suds  06/10/2020     Urine dipstick: neg.     1.  VOIDING PHASE:       a.  Uroflowmetry:  · Prolapse reduction: No  · Voided volume:  578 mL   · Voiding time:   43 seconds  · Max flow:  42 mL/s  · Avg flow:   13 mL/s   · PVR:   50 mL     The overall configuration of this uroflow study was normal.       b.  Pressure flow:  · Prolapse reduction: No  · Voided volume:   628 mL  · Voiding time:  70 seconds  · Peak flow:  49 mL/s   · Avg flow:  11 mL/s  · Max det pressure:  62  cm  H20  · Det pressure at max flow: 22 cm H20  · Void initiated by detrusor contraction.    · Urethral relaxation (EMG):  absent.    · PVR (calculated):  0 mL     The overall configuration of this pressure flow study was prolonged but normal.       2.  FILLING PHASE:  · 1st desire: 96 mL  · Normal desire:  257 mL  · Strong desire:  296 mL  · Urgency:  413 mL  · Compliance (calculated)  approx 150 mL/cm H20  · EMG activity during filling:  stable  · Detrusor contractions observed: No.       3.  URETHRAL FUNCTION/STORAGE PHASE:     a.  WITH prolapse reduction:  · CLPP (150 mL): Negative  at  169 cm H20  · VLPP (150 mL): Negative  at  111 cm H20  · CLPP (300 mL): Negative  at  167 cm H20  · VLPP (300 mL): Negative  at  89 cm H20   · CLPP (MAX ):    Negative  at  147 cm H20  · VLPP (MAX):     Negative  at  89 cm H20  · POS CLPP at max cap once pves catheter removed.      These findings are consistent with Positive urodynamic stress incontinence with cough at max cap once pves catheter removed.     Assessment:  UF normal.  PF prolonged but normal.  Compliance normal.  Max capacity 628 mL.  DO (--).  PROMISE (+).        Cysto  Assessment: Essentially normal cystourethroscopy with mild decrease in urethral coaptation.       Preoperative Diagnosis:  1. Postmenopausal bleeding    2. Cystocele with prolapse    3. Abnormal pelvic ultrasound    4. Rectocele, female    5. Uterovaginal prolapse    6. Cervical polyp    7. Urge urinary incontinence    8.     Urodynamic stress incontinence  9.     Decreased urethral coaptation, mild     Postoperative Diagnosis:  1. Postmenopausal bleeding    2. Cystocele with prolapse    3. Abnormal pelvic ultrasound    4. Rectocele, female    5. Uterovaginal prolapse    6. Cervical polyp    7. Urge urinary incontinence    8.     Urodynamic stress incontinence  9.     Decreased urethral coaptation, mild      HISTORY SINCE LAST VISIT:  Denies discharge or bleeding. Complains of lower abdominal and low back  (coccyx area) aching pain 4/10--does go away-- is improving.    Bladder issues: + PROMISE (sneeze) occasionally-- if she crosses her legs, she does not leak.  Has occasional UUI.  Not wearing a pad. Changing underwear 2-3 times/ day. Voiding every 30 minutes.  Nocturia 1/ night.   Bowel issues: Denies constipation or straining.   No HF/NS    Past Medical History:   Diagnosis Date    Ganglion cyst     History of shingles     Hyperlipidemia     Kidney stone     Otitis media     PONV (postoperative nausea and vomiting)     Sciatica     Urinary tract infection        Past Surgical History:   Procedure Laterality Date    COLONOSCOPY N/A 4/11/2018    Procedure: COLONOSCOPY Golytely;  Surgeon: Deya Harrington MD;  Location: [facility];  Service: Endoscopy;  Laterality: N/A;    CYSTOSCOPY N/A 7/6/2020    Procedure: CYSTOSCOPY;  Surgeon: Corey Riley MD;  Location: [facility];  Service: OB/GYN;  Laterality: N/A;    GANGLION CYST EXCISION      INSERTION OF MIDURETHRAL SLING N/A 7/6/2020    Procedure: SLING, MIDURETHRAL;  Surgeon: Corey Riley MD;  Location: [facility];  Service: OB/GYN;  Laterality: N/A;    LAPAROSCOPIC SALPINGO-OOPHORECTOMY N/A 7/6/2020    Procedure: SALPINGO-OOPHORECTOMY, LAPAROSCOPIC;  Surgeon: Corey Riley MD;  Location: [facility];  Service: OB/GYN;  Laterality: N/A;    ROBOT-ASSISTED LAPAROSCOPIC ABDOMINAL SACROCOLPOPEXY N/A 7/6/2020    Procedure: ROBOTIC SACROCOLPOPEXY, ABDOMEN;  Surgeon: Corey Riley MD;  Location: [facility];  Service: OB/GYN;  Laterality: N/A;    ROBOT-ASSISTED LAPAROSCOPIC HYSTERECTOMY N/A 7/6/2020    Procedure: ROBOTIC HYSTERECTOMY;  Surgeon: Corey Riley MD;  Location: [facility];  Service: OB/GYN;  Laterality: N/A;    TUBAL LIGATION  1998    urethra widened         Family History   Problem Relation Age of Onset    Cancer Maternal Grandmother         breast    Breast cancer Maternal Grandmother     Kidney disease Neg Hx     Thyroid disease Neg Hx         Social History     Socioeconomic History    Marital status:      Spouse name: Not on file    Number of children: Not on file    Years of education: Not on file    Highest education level: Not on file   Occupational History    Not on file   Social Needs    Financial resource strain: Not very hard    Food insecurity     Worry: Never true     Inability: Never true    Transportation needs     Medical: No     Non-medical: No   Tobacco Use    Smoking status: Former Smoker     Packs/day: 2.00     Types: Cigarettes     Quit date: 10/24/2008     Years since quittin.8    Smokeless tobacco: Never Used   Substance and Sexual Activity    Alcohol use: No     Frequency: Never     Binge frequency: Never    Drug use: No    Sexual activity: Yes     Partners: Male   Lifestyle    Physical activity     Days per week: 7 days     Minutes per session: 30 min    Stress: Not at all   Relationships    Social connections     Talks on phone: More than three times a week     Gets together: More than three times a week     Attends [employer] service: Not on file     Active member of club or organization: Yes     Attends meetings of clubs or organizations: More than 4 times per year     Relationship status:    Other Topics Concern    Not on file   Social History Narrative    Not on file       Current Outpatient Medications   Medication Sig Dispense Refill    docusate sodium (COLACE) 100 MG capsule Take 1 capsule (100 mg total) by mouth 2 (two) times daily. (Patient not taking: Reported on 2020) 30 capsule 2    ibuprofen (ADVIL,MOTRIN) 600 MG tablet Take 1 tablet (600 mg total) by mouth every 6 (six) hours as needed for Pain. (Patient not taking: Reported on 2020) 60 tablet 1    oxyCODONE-acetaminophen (PERCOCET) 5-325 mg per tablet Take 1 tablet by mouth every 4 (four) hours as needed for Pain. (Patient not taking: Reported on 2020) 20 tablet 0    varicella-zoster [id number], PF,  "(SHINGRIX, PF,) 50 mcg/0.5 mL injection Inject 0.5 mLs into the muscle As instructed. 2 doses (Patient not taking: Reported on 2/11/2020) 0.5 mL 2     No current facility-administered medications for this visit.        Review of patient's allergies indicates:   Allergen Reactions    Bananas [banana]     Cantaloupe     Cucumbers [cucumber fruit extract]     [place]     Watermelon         ROS  Per HPI    [facility]  Pap test: 2017, normal/HPV neg.  History of abnormal paps: No.  History of STIs:  No  Mammogram: Date of last: 2/2020.  Result: Normal  Colonoscopy: Date of last: 2018.  Result:  Polyps, benign.  Repeat due:  2023.    DEXA:  Date of last: 2005.  Result:  normal.  Repeat due:  65 yo.        Physical Exam  /84 (BP Location: Right arm, Patient Position: Sitting)   Ht 5' 4" (1.626 m)   Wt 76.3 kg (168 lb 3.4 oz)   LMP  (LMP Unknown)   BMI 28.87 kg/m²   General: alert and oriented, no acute distress  Respiratory: normal respiratory effort  Abd: soft, non-tender, non-distended.  Incisions healing well--no redness, swelling, or drainage.    Pelvic:  Ext. Genitalia: normal external genitalia. Normal bartholin's and skeens glands  Vagina: + atrophy. Normal vaginal mucosa without lesions. No discharge noted.  Incisions well healed. Sling path non tender. No mesh visible/ palpable. Non-tender bladder base without palpable mass.  +TTP L >R; + tenderness in L OI  Cervix: absent  Uterus:  absent   Urethra: no masses or tenderness  Urethral meatus: no lesions, caruncle or prolapse.    Cough stress test (--)    PVR 20 mL    Valsalva instead of kegel    Dr. Riley present during exam    Impression  1. Post-operative state     2. Urinary frequency  Urine culture   3. Mixed stress and urge urinary incontinence     4. Vaginal atrophy     5. Pelvic floor tension       We reviewed the above issues and discussed options for short-term versus long-term management of her problems.     Plan:   1. Post op healing well. " No lifting > 50 pounds without assistance.  2. Urine culture today  3. Start pelvic floor PT with  [facility] (Mereforrest Rodriguez): (p) [phone]. (f) [phone]. Established patients call:  [phone]. or  [facility] (Carmen Murdock or Jaymie Stark): (p) [phone].  Or [phone].   4. Start vaginal estrogen cream 1 gm twice weekly  5. Continue to control bowel movements  6. Patient will follow up with us in 4 months    30 minutes were spent in face to face time with this patient  90 % of this time was spent in counseling and/or coordination of care      TARYN Lopez-BC  [facility]  Division of Female Pelvic Medicine and Reconstructive Surgery  Department of Obstetrics & Gynecology

## 2020-08-21 LAB — BACTERIA UR CULT: NO GROWTH

## 2020-08-25 ENCOUNTER — PATIENT MESSAGE (OUTPATIENT)
Dept: UROGYNECOLOGY | Facility: CLINIC | Age: 60
End: 2020-08-25

## 2020-09-14 ENCOUNTER — CLINICAL SUPPORT (OUTPATIENT)
Dept: REHABILITATION | Facility: HOSPITAL | Age: 60
End: 2020-09-14
Payer: COMMERCIAL

## 2020-09-14 DIAGNOSIS — M62.838 MUSCLE SPASM: ICD-10-CM

## 2020-09-14 DIAGNOSIS — N39.46 MIXED STRESS AND URGE URINARY INCONTINENCE: ICD-10-CM

## 2020-09-14 DIAGNOSIS — M62.81 MUSCLE WEAKNESS: ICD-10-CM

## 2020-09-14 DIAGNOSIS — M62.89 PELVIC FLOOR TENSION: ICD-10-CM

## 2020-09-14 PROCEDURE — 97110 THERAPEUTIC EXERCISES: CPT | Mod: PO

## 2020-09-14 PROCEDURE — 97161 PT EVAL LOW COMPLEX 20 MIN: CPT | Mod: PO

## 2020-09-14 NOTE — PLAN OF CARE
[facility]  Pelvic Health Physical Therapy Initial Evaluation    Date: 2020   Name: Mari Ambrocio  Clinic Number: [id number]  Therapy Diagnosis:   Encounter Diagnoses   Name Primary?    Mixed stress and urge urinary incontinence     Pelvic floor tension     Muscle spasm     Muscle weakness      Physician: Mag Louis NP    Physician Orders: PT Eval and Treat   Medical Diagnosis from Referral: Mixed incontinence  Evaluation Date: 2020  Authorization Period Expiration: 20  Plan of Care Expiration: 20  Visit # / Visits authorized:     Time In: 215 PM  Time Out: 315 PM  Total Appointment Time (timed & untimed codes): 60 minutes    Precautions: universal    Subjective     Date of onset: 20    History of current condition - Mari reports: she underwent surgery in July : 1)  Robotic-assisted total laparoscopic hysterectomy with bilateral salpingo-oophorectomy 2)  Robotic-assisted sacral colpopexy with polypropylene mesh 3)  Placement of retropubic tension-free midurethral sling, Advantage Fit ([employer]).  After surgery, she did have some pain abdominally and in her low back. This has improved since surgery and is now only occasionally. She does have trouble with leakage during sneezing since time of surgery. Before surgery, she was having trouble urinating because her urethra was crimped. She performs kegels daily as well.    OB/GYN History: , vaginal deliveries, large babies, some tearing present, prolapse present (she thinks for awhile before she realized it)  Sexually active? Yes  Pain with vaginal exams, intercourse or tampon use? No    Bladder/Bowel History:    Frequency of urination:   Daytime: 6           Nighttime: 1   Difficulty initiating urine stream: No   Urine stream: strong   Complete emptying: Yes (mostly- occasionally in the AM she feels like she can get more out)   Bladder leakage: Yes   Frequency of incidents: not very  often   Amount leaked (urine): small squirt    Urinary Urgency: Yes, Able to delay the urge for at least 15 minute(s).   Frequency of bowel movements: once a day   Difficulty initiating BM: No   Quality/Shape of BM: [place] Stool Chart 3, 4, 5   Does Patient Feel Empty after BM? Yes   Fiber Supplements or Laxative Use? No   Form of protection: none     Medical History: Mari  has a past medical history of Ganglion cyst, History of shingles, Hyperlipidemia, Kidney stone, Otitis media, PONV (postoperative nausea and vomiting), Sciatica, and Urinary tract infection.     Surgical History: Mari Ambrocio  has a past surgical history that includes Ganglion cyst excision; urethra widened; Colonoscopy (N/A, 4/11/2018); Tubal ligation (1998); Robot-assisted laparoscopic hysterectomy (N/A, 7/6/2020); Robot-assisted laparoscopic abdominal sacrocolpopexy (N/A, 7/6/2020); Laparoscopic salpingo-oophorectomy (N/A, 7/6/2020); Insertion of midurethral sling (N/A, 7/6/2020); and Cystoscopy (N/A, 7/6/2020).    Medications: Mari has a current medication list which includes the following prescription(s): docusate sodium, estradiol, ibuprofen, oxycodone-acetaminophen, and varicella-zoster [id number] (pf).    Allergies:   Review of patient's allergies indicates:   Allergen Reactions    Bananas [banana]     Cantaloupe     Cucumbers [cucumber fruit extract]     [place]     Watermelon         Imaging none    Prior Therapy/Previous treatment included: none  Social History: Pt has a partner who she has been seeing for 3 years. They have not had intercourse since COVID since he is an essential worker.  Current exercise: Walk 2 miles per day and exercises for 45 minutes with weights  Occupation: Pt not currently working.  Prior Level of Function: some pain and discomfort following surgery, leakage with sneezing  Current Level of Function: leakage with increased intra-bdominal surgery, some abdominal and low back pain    Types of  fluid intake: water and soda  Diet: Normal   Habitus: well developed, well nourished  Abuse/Neglect: No     Pts goals: to make sure she is doing her kegels right but she is unsure    OBJECTIVE     See EMR under MEDIA for written consent provided 9/14/2020  Chaperone: declined    ORTHO SCREEN  Posture in sitting: slouched   Posture in standing: forward head and forward and rounded shoulders   Pelvic alignment: no sign of deviations noted in supine   SI Joint Palpation: Denies tenderness to SI joint palpation bilaterally.  Adductor Palpation: WNLs    ABDOMINAL WALL ASSESSMENT  Palpation: WNLs  Abdominal strength: Rectus abdominus: 2/5     Transverse abdominus: poor isolation/coord  Scarring: laparoscopic scars from recent surgery  Diastasis: 2 1/2 finger width at umbilicus    VAGINAL PELVIC FLOOR EXAM    EXTERNAL ASSESSMENT  Introitus: WNL  Skin condition: labia minora atrophy  Scarring: none   Sensation: WNL   Pain: none  Voluntary contraction: visible lift  Voluntary relaxation: visible drop  Involuntary contraction: nil  Bearing down: nil  Perineal descent: present        INTERNAL ASSESSMENT  Pain: none   Sensation: able to localized pressure appropriately   Vaginal vault: WNL   Muscle Bulk: atrophy   Muscle Power: 2/5  Muscle Endurance: 3 sec  # Reps To Fatigue: 5    Fast Contractions in 10 seconds: 5     Quality of contraction: decreased hold and slow relaxation   Specificity: WNL   Coordination: tends to hold breath during PFM contration   Prolapse check: DNA    TREATMENT     Treatment Time In: 305 PM  Treatment Time Out: 315 PM  Total Treatment time (time-based codes) separate from Evaluation: 15 minutes      Therapeutic Exercise to develop strength, endurance and core stabilization for 15 minutes including:    · Kegels Endurance Holds: Kegel edu and practice.  Increased time spent on edu on proper technique.  Pt improves with practice and verbal cues.    · TrA activation    · Clams x 10    · Bridging with hip  add x 10    Patient Education provided:   general anatomy/physiology of urinary/ bowel  system, benefits of treatment and risks of treatment were discussed with the pt.     Home Exercises provided:  Written Home Exercises provided: yes.  Exercises were reviewed and Mari was able to demonstrate them prior to the end of the session.    Mari demonstrated good  understanding of the education provided.     See EMR under Patient Instructions for exercises provided 9/14/2020.    Assessment     Mari is a 60 y.o. female referred to outpatient Physical Therapy with a medical diagnosis of mixed incontinence. Pt presents with decreased pelvic floor strength, endurance, and coordination leading to occasional leakage. She is s/p Robotic-assisted total laparoscopic hysterectomy with bilateral salpingo-oophorectomy, Robotic-assisted sacral colpopexy with polypropylene mesh, and placement of retropubic tension-free midurethral sling, Advantage Fit ([employer]).  Her MD would like her to attend therapy for pelvic strengthening following surgery.    Pt prognosis is Good.   Pt will benefit from skilled outpatient Physical Therapy to address the deficits stated above and in the chart below, provide pt/family education, and to maximize pt's level of independence.     Plan of care discussed with patient: Yes  Pt's spiritual, cultural and educational needs considered and patient is agreeable to the plan of care and goals as stated below:     Anticipated Barriers for therapy: none    Medical Necessity is demonstrated by the following:    History  Co-morbidities and personal factors that may impact the plan of care Co-morbidities   prior abdominal surgery    Personal Factors  no deficits     low   Examination  Body structures and functions, activity limitations and participation restrictions that may impact the plan of care Body Regions/Systems/Functions:  poor trunk stability, decreased pelvic muscle strength, decreased  "endurance of the pelvic muscles, decreased phasic ability of the pelvic muscles and poor quality of pelvic muscle contraction     Activity Limitations:  delaying urge to urinate and incontinence with ADLs    Participation Restrictions:  all ADLs/iADLs uninterrupted by urinary incontinence/urgency/frequency    Activity limitations:   Learning and applying knowledge  no deficits    General Tasks and Commands  no deficits    Communication  no deficits    Mobility  no deficits    Self care  no deficits    Domestic Life  no deficits    Interactions/Relationships  no deficits    Life Areas  no deficits    Community and Social Life  community life  recreation and leisure       high   Clinical Presentation stable and uncomplicated low   Decision Making/ Complexity Score: low       Goals:  Short Term Goals: 4 weeks     Pt to perform "the knack" prior to coughing, laughing or sneezing to decrease risk of incontinence.  Pt to be able to perform a 5  second kegel x 10 reps to demonstrate improving strength and endurance needed for continence.  Pt to increase abdominal wall strength by at least 1 muscle grade to improve lumbopelvic stability and help with continence.       Long Term Goals: 12 weeks   Pt will report improved ability to cough/sneeze/laugh/yell with little (drops) to no urinary leakage 6/7 days per week.   Pt to be discharged with home plan for carry over after discharge.    Pt to be able to perform a 10 second kegel x 10 reps to demonstrate improving strength and endurance needed for continence.  Pt to report no longer feeling the need to urinate just in case when shopping or participating in social activities to demonstrate improving pelvic floor and bladder control.        Plan     Plan of care Certification: 9/14/2020 to 12/14/20.    Outpatient Physical Therapy 1 times every two weeks for 12 weeks to include the following interventions: therapeutic exercises, therapeutic activity, neuromuscular re-education, " manual therapy, modalities PRN, patient/family education and self care/home management    Ashley Holstein, PT

## 2020-09-14 NOTE — PATIENT INSTRUCTIONS
Home Exercise Program: 09/14/2020    KEGELS WHILE LAYING DOWN  1. Lay on your back comfortably with legs and buttocks relaxed.  2. Contract and LIFT the pelvic floor muscles as if you're trying to stop the stream of urine and passage of gas.  3. Hold LIFT for 5 seconds without holding breath. You should be able to talk out loud the entire time.  4. Release and DROP the pelvic floor muscles for 10 seconds.  5. Repeat 10 times, 2 sets per day. Spread throughout the day.    No Kegels while urinating!

## 2020-09-30 ENCOUNTER — PATIENT MESSAGE (OUTPATIENT)
Dept: REHABILITATION | Facility: HOSPITAL | Age: 60
End: 2020-09-30

## 2020-10-06 ENCOUNTER — CLINICAL SUPPORT (OUTPATIENT)
Dept: REHABILITATION | Facility: HOSPITAL | Age: 60
End: 2020-10-06
Payer: COMMERCIAL

## 2020-10-06 ENCOUNTER — PATIENT MESSAGE (OUTPATIENT)
Dept: INTERNAL MEDICINE | Facility: CLINIC | Age: 60
End: 2020-10-06

## 2020-10-06 DIAGNOSIS — M62.838 MUSCLE SPASM: ICD-10-CM

## 2020-10-06 DIAGNOSIS — M62.81 MUSCLE WEAKNESS: ICD-10-CM

## 2020-10-06 PROCEDURE — 97110 THERAPEUTIC EXERCISES: CPT | Mod: PO

## 2020-10-06 NOTE — PROGRESS NOTES
"  Pelvic Health Physical Therapy   Treatment Note     Name: Mari Ambrocio  Clinic Number: [id number]    Therapy Diagnosis:   Encounter Diagnoses   Name Primary?    Muscle spasm     Muscle weakness      Physician: Mag Louis NP    Visit Date: 10/6/2020    Physician Orders: PT Eval and Treat   Medical Diagnosis from Referral: Mixed incontinence  Evaluation Date: 9/14/2020  Authorization Period Expiration: 12/31/20  Plan of Care Expiration: 12/14/20  Visit # / Visits authorized: 2/ 20       Time In: 930 AM  Time Out: 1025 AM  Total Billable Time: 55 minutes    Precautions: Standard    Subjective     Pt reports: she did great with her homework. No real leakage since last time she came.  She was compliant with home exercise program.  Response to previous treatment: no soreness  Functional change: improved leakage    Pain: No pain  Location: No pain    Objective        Therapeutic Exercise to develop strength, endurance and core stabilization for 55 minutes including:    With assist of SEMG:  We worked in supine with external perineal leads.      Kegels endurance holds 5" W/10" R x 10 for 2 cycles, focusing on breathing cues and increased hold   Kegels quick flicks 2" W/4" R x 15      · TrA activation 10 x 5s with BKFO    · TrA activation 10 x 5s with march     · Clams x 10 YTB    · Bridging with hip add x 10      Home Exercises Provided and Patient Education Provided     Education provided:   - anatomy/physiology of pelvic floor  Discussed progression of plan of care with patient; educated pt in activity modification; reviewed HEP with pt. Pt demonstrated and verbalized understanding of all instruction and was provided with a handout of HEP (see Patient Instructions).      Written Home Exercises Provided: yes.  Exercises were reviewed and Mari was able to demonstrate them prior to the end of the session.  Mari demonstrated good  understanding of the education provided.     See EMR under Patient " "Instructions for exercises provided 10/6/2020.    Assessment     Good tolerance to tx session today. With SEMG, patient demonstrated normal baseline resting, good WR rise, fair holding ability and good derecruiting. She required min verbal cues to refrain from breathholding. In addition, she was able to advance TrA activation activities to further promote core stabilization.    Mari is progressing well towards her goals.   Pt prognosis is Good.     Pt will continue to benefit from skilled outpatient physical therapy to address the deficits listed in the problem list box on initial evaluation, provide pt/family education and to maximize pt's level of independence in the home and community environment.     Pt's spiritual, cultural and educational needs considered and pt agreeable to plan of care and goals.     Anticipated barriers to physical therapy: none    Short Term Goals: 4 weeks      Pt to perform "the knack" prior to coughing, laughing or sneezing to decrease risk of incontinence.  Pt to be able to perform a 5  second kegel x 10 reps to demonstrate improving strength and endurance needed for continence.  Pt to increase abdominal wall strength by at least 1 muscle grade to improve lumbopelvic stability and help with continence.                   Long Term Goals: 12 weeks   Pt will report improved ability to cough/sneeze/laugh/yell with little (drops) to no urinary leakage 6/7 days per week.   Pt to be discharged with home plan for carry over after discharge.    Pt to be able to perform a 10 second kegel x 10 reps to demonstrate improving strength and endurance needed for continence.  Pt to report no longer feeling the need to urinate just in case when shopping or participating in social activities to demonstrate improving pelvic floor and bladder control.      Plan     DC next session. Biofeedback for kegel performance    Ashley Holstein, PT     "

## 2020-11-04 ENCOUNTER — PATIENT MESSAGE (OUTPATIENT)
Dept: UROGYNECOLOGY | Facility: CLINIC | Age: 60
End: 2020-11-04

## 2020-11-04 DIAGNOSIS — N95.2 VAGINAL ATROPHY: ICD-10-CM

## 2020-11-04 RX ORDER — ESTRADIOL 0.1 MG/G
1 CREAM VAGINAL
Qty: 42.5 G | Refills: 3 | Status: CANCELLED | OUTPATIENT
Start: 2020-11-05

## 2020-11-04 NOTE — TELEPHONE ENCOUNTER
Pt is requesting a rx refill for the estradiol 0.01% vaginal cream. She was last seen by ADELA Louis on 8/20/2020.

## 2021-01-06 ENCOUNTER — OFFICE VISIT (OUTPATIENT)
Dept: UROGYNECOLOGY | Facility: CLINIC | Age: 61
End: 2021-01-06
Payer: COMMERCIAL

## 2021-01-06 VITALS
SYSTOLIC BLOOD PRESSURE: 140 MMHG | WEIGHT: 176.13 LBS | HEIGHT: 64 IN | BODY MASS INDEX: 30.07 KG/M2 | DIASTOLIC BLOOD PRESSURE: 80 MMHG

## 2021-01-06 DIAGNOSIS — N95.2 VAGINAL ATROPHY: Primary | ICD-10-CM

## 2021-01-06 DIAGNOSIS — K59.00 CONSTIPATION, UNSPECIFIED CONSTIPATION TYPE: ICD-10-CM

## 2021-01-06 PROCEDURE — 99213 PR OFFICE/OUTPT VISIT, EST, LEVL III, 20-29 MIN: ICD-10-PCS | Mod: S$GLB,,, | Performed by: NURSE PRACTITIONER

## 2021-01-06 PROCEDURE — 3008F PR BODY MASS INDEX (BMI) DOCUMENTED: ICD-10-PCS | Mod: CPTII,S$GLB,, | Performed by: NURSE PRACTITIONER

## 2021-01-06 PROCEDURE — 3008F BODY MASS INDEX DOCD: CPT | Mod: CPTII,S$GLB,, | Performed by: NURSE PRACTITIONER

## 2021-01-06 PROCEDURE — 1125F PR PAIN SEVERITY QUANTIFIED, PAIN PRESENT: ICD-10-PCS | Mod: S$GLB,,, | Performed by: NURSE PRACTITIONER

## 2021-01-06 PROCEDURE — 1125F AMNT PAIN NOTED PAIN PRSNT: CPT | Mod: S$GLB,,, | Performed by: NURSE PRACTITIONER

## 2021-01-06 PROCEDURE — 99999 PR PBB SHADOW E&M-EST. PATIENT-LVL III: CPT | Mod: PBBFAC,,, | Performed by: NURSE PRACTITIONER

## 2021-01-06 PROCEDURE — 99213 OFFICE O/P EST LOW 20 MIN: CPT | Mod: S$GLB,,, | Performed by: NURSE PRACTITIONER

## 2021-01-06 PROCEDURE — 99999 PR PBB SHADOW E&M-EST. PATIENT-LVL III: ICD-10-PCS | Mod: PBBFAC,,, | Performed by: NURSE PRACTITIONER

## 2021-01-23 ENCOUNTER — PATIENT MESSAGE (OUTPATIENT)
Dept: INTERNAL MEDICINE | Facility: CLINIC | Age: 61
End: 2021-01-23

## 2021-01-27 ENCOUNTER — PATIENT MESSAGE (OUTPATIENT)
Dept: UROGYNECOLOGY | Facility: CLINIC | Age: 61
End: 2021-01-27

## 2021-01-27 DIAGNOSIS — N95.2 VAGINAL ATROPHY: ICD-10-CM

## 2021-01-27 DIAGNOSIS — Z12.31 ENCOUNTER FOR SCREENING MAMMOGRAM FOR BREAST CANCER: Primary | ICD-10-CM

## 2021-01-27 RX ORDER — ESTRADIOL 10 UG/1
10 INSERT VAGINAL
Qty: 8 TABLET | Refills: 11 | Status: SHIPPED | OUTPATIENT
Start: 2021-01-28 | End: 2021-01-29

## 2021-01-29 ENCOUNTER — PATIENT MESSAGE (OUTPATIENT)
Dept: UROGYNECOLOGY | Facility: CLINIC | Age: 61
End: 2021-01-29

## 2021-01-29 DIAGNOSIS — N95.2 VAGINAL ATROPHY: ICD-10-CM

## 2021-01-29 RX ORDER — ESTRADIOL 10 UG/1
10 INSERT VAGINAL
Qty: 8 TABLET | Refills: 11 | Status: SHIPPED | OUTPATIENT
Start: 2021-02-01 | End: 2021-02-01 | Stop reason: SDUPTHER

## 2021-02-01 ENCOUNTER — PATIENT MESSAGE (OUTPATIENT)
Dept: UROGYNECOLOGY | Facility: CLINIC | Age: 61
End: 2021-02-01

## 2021-02-01 DIAGNOSIS — N95.2 VAGINAL ATROPHY: ICD-10-CM

## 2021-02-01 RX ORDER — ESTRADIOL 10 UG/1
10 INSERT VAGINAL
Qty: 8 TABLET | Refills: 11 | Status: SHIPPED | OUTPATIENT
Start: 2021-02-01 | End: 2021-07-07

## 2021-02-04 ENCOUNTER — PATIENT MESSAGE (OUTPATIENT)
Dept: UROGYNECOLOGY | Facility: CLINIC | Age: 61
End: 2021-02-04

## 2021-02-08 ENCOUNTER — PATIENT MESSAGE (OUTPATIENT)
Dept: UROGYNECOLOGY | Facility: CLINIC | Age: 61
End: 2021-02-08

## 2021-02-08 ENCOUNTER — HOSPITAL ENCOUNTER (OUTPATIENT)
Dept: RADIOLOGY | Facility: HOSPITAL | Age: 61
Discharge: HOME | End: 2021-02-08
Attending: NURSE PRACTITIONER
Payer: COMMERCIAL

## 2021-02-08 DIAGNOSIS — Z12.31 ENCOUNTER FOR SCREENING MAMMOGRAM FOR BREAST CANCER: ICD-10-CM

## 2021-02-08 PROCEDURE — 77067 MAMMO DIGITAL SCREENING BILAT WITH TOMO: ICD-10-PCS | Mod: 26,,, | Performed by: RADIOLOGY

## 2021-02-08 PROCEDURE — 77063 BREAST TOMOSYNTHESIS BI: CPT | Mod: 26,,, | Performed by: RADIOLOGY

## 2021-02-08 PROCEDURE — 77067 SCR MAMMO BI INCL CAD: CPT | Mod: TC

## 2021-02-08 PROCEDURE — 77067 SCR MAMMO BI INCL CAD: CPT | Mod: 26,,, | Performed by: RADIOLOGY

## 2021-02-08 PROCEDURE — 77063 MAMMO DIGITAL SCREENING BILAT WITH TOMO: ICD-10-PCS | Mod: 26,,, | Performed by: RADIOLOGY

## 2021-02-25 ENCOUNTER — TELEPHONE (OUTPATIENT)
Dept: UROGYNECOLOGY | Facility: CLINIC | Age: 61
End: 2021-02-25

## 2021-03-26 ENCOUNTER — IMMUNIZATION (OUTPATIENT)
Dept: PRIMARY CARE CLINIC | Facility: CLINIC | Age: 61
End: 2021-03-26

## 2021-03-26 DIAGNOSIS — Z23 NEED FOR VACCINATION: Primary | ICD-10-CM

## 2021-03-26 PROCEDURE — 91300 PR SARS-COV- 2 COVID-19 VACCINE, NO PRSV, 30MCG/0.3ML, IM: CPT | Mod: S$GLB,,, | Performed by: INTERNAL MEDICINE

## 2021-03-26 PROCEDURE — 0001A PR IMMUNIZ ADMIN, SARS-COV-2 COVID-19 VACC, 30MCG/0.3ML, 1ST DOSE: CPT | Mod: CV19,S$GLB,, | Performed by: INTERNAL MEDICINE

## 2021-03-26 PROCEDURE — 0001A PR IMMUNIZ ADMIN, SARS-COV-2 COVID-19 VACC, 30MCG/0.3ML, 1ST DOSE: ICD-10-PCS | Mod: CV19,S$GLB,, | Performed by: INTERNAL MEDICINE

## 2021-03-26 PROCEDURE — 91300 PR SARS-COV- 2 COVID-19 VACCINE, NO PRSV, 30MCG/0.3ML, IM: ICD-10-PCS | Mod: S$GLB,,, | Performed by: INTERNAL MEDICINE

## 2021-03-26 RX ADMIN — Medication 0.3 ML: at 12:03

## 2021-04-18 ENCOUNTER — IMMUNIZATION (OUTPATIENT)
Dept: PRIMARY CARE CLINIC | Facility: CLINIC | Age: 61
End: 2021-04-18
Payer: COMMERCIAL

## 2021-04-18 DIAGNOSIS — Z23 NEED FOR VACCINATION: Primary | ICD-10-CM

## 2021-04-18 PROCEDURE — 0002A PR IMMUNIZ ADMIN, SARS-COV-2 COVID-19 VACC, 30MCG/0.3ML, 2ND DOSE: ICD-10-PCS | Mod: CV19,S$GLB,, | Performed by: INTERNAL MEDICINE

## 2021-04-18 PROCEDURE — 0002A PR IMMUNIZ ADMIN, SARS-COV-2 COVID-19 VACC, 30MCG/0.3ML, 2ND DOSE: CPT | Mod: CV19,S$GLB,, | Performed by: INTERNAL MEDICINE

## 2021-04-18 PROCEDURE — 91300 PR SARS-COV- 2 COVID-19 VACCINE, NO PRSV, 30MCG/0.3ML, IM: CPT | Mod: S$GLB,,, | Performed by: INTERNAL MEDICINE

## 2021-04-18 PROCEDURE — 91300 PR SARS-COV- 2 COVID-19 VACCINE, NO PRSV, 30MCG/0.3ML, IM: ICD-10-PCS | Mod: S$GLB,,, | Performed by: INTERNAL MEDICINE

## 2021-04-18 RX ADMIN — Medication 0.3 ML: at 07:04

## 2021-06-29 ENCOUNTER — PATIENT MESSAGE (OUTPATIENT)
Dept: INTERNAL MEDICINE | Facility: CLINIC | Age: 61
End: 2021-06-29

## 2021-07-04 ENCOUNTER — PATIENT MESSAGE (OUTPATIENT)
Dept: UROGYNECOLOGY | Facility: CLINIC | Age: 61
End: 2021-07-04

## 2021-07-07 ENCOUNTER — LAB VISIT (OUTPATIENT)
Dept: LAB | Facility: HOSPITAL | Age: 61
End: 2021-07-07
Attending: INTERNAL MEDICINE
Payer: COMMERCIAL

## 2021-07-07 ENCOUNTER — OFFICE VISIT (OUTPATIENT)
Dept: INTERNAL MEDICINE | Facility: CLINIC | Age: 61
End: 2021-07-07
Payer: COMMERCIAL

## 2021-07-07 VITALS
SYSTOLIC BLOOD PRESSURE: 116 MMHG | OXYGEN SATURATION: 98 % | WEIGHT: 171.94 LBS | HEIGHT: 65 IN | DIASTOLIC BLOOD PRESSURE: 68 MMHG | HEART RATE: 74 BPM | BODY MASS INDEX: 28.65 KG/M2 | RESPIRATION RATE: 16 BRPM

## 2021-07-07 DIAGNOSIS — F41.1 GENERALIZED ANXIETY DISORDER: ICD-10-CM

## 2021-07-07 DIAGNOSIS — Z00.00 ANNUAL PHYSICAL EXAM: Primary | ICD-10-CM

## 2021-07-07 DIAGNOSIS — Z11.3 SCREENING FOR VENEREAL DISEASE: ICD-10-CM

## 2021-07-07 DIAGNOSIS — Z00.00 ANNUAL PHYSICAL EXAM: ICD-10-CM

## 2021-07-07 LAB
ALBUMIN SERPL BCP-MCNC: 4.1 G/DL (ref 3.5–5.2)
ALP SERPL-CCNC: 101 U/L (ref 55–135)
ALT SERPL W/O P-5'-P-CCNC: 12 U/L (ref 10–44)
ANION GAP SERPL CALC-SCNC: 8 MMOL/L (ref 8–16)
AST SERPL-CCNC: 16 U/L (ref 10–40)
BILIRUB DIRECT SERPL-MCNC: 0.2 MG/DL (ref 0.1–0.3)
BILIRUB SERPL-MCNC: 0.5 MG/DL (ref 0.1–1)
BUN SERPL-MCNC: 11 MG/DL (ref 8–23)
CALCIUM SERPL-MCNC: 10.3 MG/DL (ref 8.7–10.5)
CHLORIDE SERPL-SCNC: 106 MMOL/L (ref 95–110)
CHOLEST SERPL-MCNC: 160 MG/DL (ref 120–199)
CHOLEST/HDLC SERPL: 3.3 {RATIO} (ref 2–5)
CO2 SERPL-SCNC: 27 MMOL/L (ref 23–29)
CREAT SERPL-MCNC: 0.9 MG/DL (ref 0.5–1.4)
EST. GFR  (AFRICAN AMERICAN): >60 ML/MIN/1.73 M^2
EST. GFR  (NON AFRICAN AMERICAN): >60 ML/MIN/1.73 M^2
ESTIMATED AVG GLUCOSE: 108 MG/DL (ref 68–131)
GLUCOSE SERPL-MCNC: 105 MG/DL (ref 70–110)
HBA1C MFR BLD: 5.4 % (ref 4–5.6)
HDLC SERPL-MCNC: 49 MG/DL (ref 40–75)
HDLC SERPL: 30.6 % (ref 20–50)
LDLC SERPL CALC-MCNC: 90 MG/DL (ref 63–159)
NONHDLC SERPL-MCNC: 111 MG/DL
POTASSIUM SERPL-SCNC: 4.5 MMOL/L (ref 3.5–5.1)
PROT SERPL-MCNC: 7.4 G/DL (ref 6–8.4)
SODIUM SERPL-SCNC: 141 MMOL/L (ref 136–145)
TRIGL SERPL-MCNC: 105 MG/DL (ref 30–150)

## 2021-07-07 PROCEDURE — 3008F BODY MASS INDEX DOCD: CPT | Mod: CPTII,S$GLB,, | Performed by: INTERNAL MEDICINE

## 2021-07-07 PROCEDURE — 80048 BASIC METABOLIC PNL TOTAL CA: CPT | Performed by: INTERNAL MEDICINE

## 2021-07-07 PROCEDURE — 99999 PR PBB SHADOW E&M-EST. PATIENT-LVL IV: ICD-10-PCS | Mod: PBBFAC,,, | Performed by: INTERNAL MEDICINE

## 2021-07-07 PROCEDURE — 87389 HIV-1 AG W/HIV-1&-2 AB AG IA: CPT | Performed by: INTERNAL MEDICINE

## 2021-07-07 PROCEDURE — 80076 HEPATIC FUNCTION PANEL: CPT | Performed by: INTERNAL MEDICINE

## 2021-07-07 PROCEDURE — 99999 PR PBB SHADOW E&M-EST. PATIENT-LVL IV: CPT | Mod: PBBFAC,,, | Performed by: INTERNAL MEDICINE

## 2021-07-07 PROCEDURE — 99396 PR PREVENTIVE VISIT,EST,40-64: ICD-10-PCS | Mod: 25,S$GLB,, | Performed by: INTERNAL MEDICINE

## 2021-07-07 PROCEDURE — 99396 PREV VISIT EST AGE 40-64: CPT | Mod: 25,S$GLB,, | Performed by: INTERNAL MEDICINE

## 2021-07-07 PROCEDURE — 86592 SYPHILIS TEST NON-TREP QUAL: CPT | Performed by: INTERNAL MEDICINE

## 2021-07-07 PROCEDURE — 83036 HEMOGLOBIN GLYCOSYLATED A1C: CPT | Performed by: INTERNAL MEDICINE

## 2021-07-07 PROCEDURE — 36415 COLL VENOUS BLD VENIPUNCTURE: CPT | Mod: PO | Performed by: INTERNAL MEDICINE

## 2021-07-07 PROCEDURE — 80061 LIPID PANEL: CPT | Performed by: INTERNAL MEDICINE

## 2021-07-07 PROCEDURE — 3008F PR BODY MASS INDEX (BMI) DOCUMENTED: ICD-10-PCS | Mod: CPTII,S$GLB,, | Performed by: INTERNAL MEDICINE

## 2021-07-07 RX ORDER — ESCITALOPRAM OXALATE 10 MG/1
10 TABLET ORAL DAILY
Qty: 30 TABLET | Refills: 2 | Status: SHIPPED | OUTPATIENT
Start: 2021-07-07 | End: 2021-09-29

## 2021-07-08 LAB
HIV 1+2 AB+HIV1 P24 AG SERPL QL IA: NEGATIVE
RPR SER QL: NORMAL

## 2021-07-09 ENCOUNTER — PATIENT MESSAGE (OUTPATIENT)
Dept: INTERNAL MEDICINE | Facility: CLINIC | Age: 61
End: 2021-07-09

## 2021-07-09 DIAGNOSIS — M25.561 CHRONIC PAIN OF BOTH KNEES: Primary | ICD-10-CM

## 2021-07-09 DIAGNOSIS — M25.562 CHRONIC PAIN OF BOTH KNEES: Primary | ICD-10-CM

## 2021-07-09 DIAGNOSIS — F41.1 GENERALIZED ANXIETY DISORDER: ICD-10-CM

## 2021-07-09 DIAGNOSIS — G89.29 CHRONIC PAIN OF BOTH KNEES: Primary | ICD-10-CM

## 2021-07-12 ENCOUNTER — PATIENT MESSAGE (OUTPATIENT)
Dept: UROGYNECOLOGY | Facility: CLINIC | Age: 61
End: 2021-07-12

## 2021-07-12 ENCOUNTER — PATIENT MESSAGE (OUTPATIENT)
Dept: INTERNAL MEDICINE | Facility: CLINIC | Age: 61
End: 2021-07-12

## 2021-07-12 ENCOUNTER — TELEPHONE (OUTPATIENT)
Dept: UROGYNECOLOGY | Facility: CLINIC | Age: 61
End: 2021-07-12

## 2021-07-12 RX ORDER — IBUPROFEN 600 MG/1
600 TABLET ORAL 3 TIMES DAILY
Qty: 30 TABLET | Refills: 0 | Status: SHIPPED | OUTPATIENT
Start: 2021-07-12 | End: 2021-12-06 | Stop reason: SDUPTHER

## 2021-07-17 ENCOUNTER — PATIENT MESSAGE (OUTPATIENT)
Dept: UROGYNECOLOGY | Facility: CLINIC | Age: 61
End: 2021-07-17

## 2021-08-02 ENCOUNTER — PATIENT OUTREACH (OUTPATIENT)
Dept: ADMINISTRATIVE | Facility: OTHER | Age: 61
End: 2021-08-02

## 2021-08-04 ENCOUNTER — OFFICE VISIT (OUTPATIENT)
Dept: UROGYNECOLOGY | Facility: CLINIC | Age: 61
End: 2021-08-04
Payer: COMMERCIAL

## 2021-08-04 ENCOUNTER — OFFICE VISIT (OUTPATIENT)
Dept: INTERNAL MEDICINE | Facility: CLINIC | Age: 61
End: 2021-08-04
Payer: COMMERCIAL

## 2021-08-04 VITALS
SYSTOLIC BLOOD PRESSURE: 110 MMHG | HEIGHT: 65 IN | BODY MASS INDEX: 27.95 KG/M2 | DIASTOLIC BLOOD PRESSURE: 70 MMHG | WEIGHT: 167.75 LBS

## 2021-08-04 DIAGNOSIS — F41.1 GENERALIZED ANXIETY DISORDER: Primary | ICD-10-CM

## 2021-08-04 DIAGNOSIS — M62.89 PELVIC FLOOR TENSION: ICD-10-CM

## 2021-08-04 DIAGNOSIS — Z12.31 ENCOUNTER FOR SCREENING MAMMOGRAM FOR BREAST CANCER: Primary | ICD-10-CM

## 2021-08-04 DIAGNOSIS — N95.2 VAGINAL ATROPHY: ICD-10-CM

## 2021-08-04 PROCEDURE — 1160F RVW MEDS BY RX/DR IN RCRD: CPT | Mod: CPTII,S$GLB,, | Performed by: NURSE PRACTITIONER

## 2021-08-04 PROCEDURE — 99213 OFFICE O/P EST LOW 20 MIN: CPT | Mod: 95,,, | Performed by: INTERNAL MEDICINE

## 2021-08-04 PROCEDURE — 1126F PR PAIN SEVERITY QUANTIFIED, NO PAIN PRESENT: ICD-10-PCS | Mod: CPTII,S$GLB,, | Performed by: NURSE PRACTITIONER

## 2021-08-04 PROCEDURE — 1160F PR REVIEW ALL MEDS BY PRESCRIBER/CLIN PHARMACIST DOCUMENTED: ICD-10-PCS | Mod: CPTII,,, | Performed by: INTERNAL MEDICINE

## 2021-08-04 PROCEDURE — 3044F PR MOST RECENT HEMOGLOBIN A1C LEVEL <7.0%: ICD-10-PCS | Mod: CPTII,,, | Performed by: INTERNAL MEDICINE

## 2021-08-04 PROCEDURE — 1159F MED LIST DOCD IN RCRD: CPT | Mod: CPTII,,, | Performed by: INTERNAL MEDICINE

## 2021-08-04 PROCEDURE — 99999 PR PBB SHADOW E&M-EST. PATIENT-LVL III: ICD-10-PCS | Mod: PBBFAC,,, | Performed by: NURSE PRACTITIONER

## 2021-08-04 PROCEDURE — 1160F RVW MEDS BY RX/DR IN RCRD: CPT | Mod: CPTII,,, | Performed by: INTERNAL MEDICINE

## 2021-08-04 PROCEDURE — 3044F PR MOST RECENT HEMOGLOBIN A1C LEVEL <7.0%: ICD-10-PCS | Mod: CPTII,S$GLB,, | Performed by: NURSE PRACTITIONER

## 2021-08-04 PROCEDURE — 3044F HG A1C LEVEL LT 7.0%: CPT | Mod: CPTII,,, | Performed by: INTERNAL MEDICINE

## 2021-08-04 PROCEDURE — 1160F PR REVIEW ALL MEDS BY PRESCRIBER/CLIN PHARMACIST DOCUMENTED: ICD-10-PCS | Mod: CPTII,S$GLB,, | Performed by: NURSE PRACTITIONER

## 2021-08-04 PROCEDURE — 1159F PR MEDICATION LIST DOCUMENTED IN MEDICAL RECORD: ICD-10-PCS | Mod: CPTII,S$GLB,, | Performed by: NURSE PRACTITIONER

## 2021-08-04 PROCEDURE — 99213 PR OFFICE/OUTPT VISIT, EST, LEVL III, 20-29 MIN: ICD-10-PCS | Mod: 95,,, | Performed by: INTERNAL MEDICINE

## 2021-08-04 PROCEDURE — 99213 PR OFFICE/OUTPT VISIT, EST, LEVL III, 20-29 MIN: ICD-10-PCS | Mod: S$GLB,,, | Performed by: NURSE PRACTITIONER

## 2021-08-04 PROCEDURE — 1126F AMNT PAIN NOTED NONE PRSNT: CPT | Mod: CPTII,S$GLB,, | Performed by: NURSE PRACTITIONER

## 2021-08-04 PROCEDURE — 3074F SYST BP LT 130 MM HG: CPT | Mod: CPTII,S$GLB,, | Performed by: NURSE PRACTITIONER

## 2021-08-04 PROCEDURE — 3044F HG A1C LEVEL LT 7.0%: CPT | Mod: CPTII,S$GLB,, | Performed by: NURSE PRACTITIONER

## 2021-08-04 PROCEDURE — 3078F PR MOST RECENT DIASTOLIC BLOOD PRESSURE < 80 MM HG: ICD-10-PCS | Mod: CPTII,S$GLB,, | Performed by: NURSE PRACTITIONER

## 2021-08-04 PROCEDURE — 99213 OFFICE O/P EST LOW 20 MIN: CPT | Mod: S$GLB,,, | Performed by: NURSE PRACTITIONER

## 2021-08-04 PROCEDURE — 1159F PR MEDICATION LIST DOCUMENTED IN MEDICAL RECORD: ICD-10-PCS | Mod: CPTII,,, | Performed by: INTERNAL MEDICINE

## 2021-08-04 PROCEDURE — 99999 PR PBB SHADOW E&M-EST. PATIENT-LVL III: CPT | Mod: PBBFAC,,, | Performed by: NURSE PRACTITIONER

## 2021-08-04 PROCEDURE — 3078F DIAST BP <80 MM HG: CPT | Mod: CPTII,S$GLB,, | Performed by: NURSE PRACTITIONER

## 2021-08-04 PROCEDURE — 3074F PR MOST RECENT SYSTOLIC BLOOD PRESSURE < 130 MM HG: ICD-10-PCS | Mod: CPTII,S$GLB,, | Performed by: NURSE PRACTITIONER

## 2021-08-04 PROCEDURE — 3008F PR BODY MASS INDEX (BMI) DOCUMENTED: ICD-10-PCS | Mod: CPTII,S$GLB,, | Performed by: NURSE PRACTITIONER

## 2021-08-04 PROCEDURE — 3008F BODY MASS INDEX DOCD: CPT | Mod: CPTII,S$GLB,, | Performed by: NURSE PRACTITIONER

## 2021-08-04 PROCEDURE — 1159F MED LIST DOCD IN RCRD: CPT | Mod: CPTII,S$GLB,, | Performed by: NURSE PRACTITIONER

## 2021-08-04 RX ORDER — ESTRADIOL 0.1 MG/G
1 CREAM VAGINAL
Qty: 42.5 G | Refills: 3 | Status: SHIPPED | OUTPATIENT
Start: 2021-08-05 | End: 2021-12-06 | Stop reason: SDUPTHER

## 2021-09-27 DIAGNOSIS — F41.1 GENERALIZED ANXIETY DISORDER: ICD-10-CM

## 2021-09-29 RX ORDER — ESCITALOPRAM OXALATE 10 MG/1
TABLET ORAL
Qty: 90 TABLET | Refills: 1 | Status: SHIPPED | OUTPATIENT
Start: 2021-09-29 | End: 2022-08-17

## 2021-12-05 ENCOUNTER — PATIENT MESSAGE (OUTPATIENT)
Dept: UROGYNECOLOGY | Facility: CLINIC | Age: 61
End: 2021-12-05
Payer: COMMERCIAL

## 2021-12-05 DIAGNOSIS — M25.562 CHRONIC PAIN OF BOTH KNEES: ICD-10-CM

## 2021-12-05 DIAGNOSIS — M25.561 CHRONIC PAIN OF BOTH KNEES: ICD-10-CM

## 2021-12-05 DIAGNOSIS — N95.2 VAGINAL ATROPHY: ICD-10-CM

## 2021-12-05 DIAGNOSIS — G89.29 CHRONIC PAIN OF BOTH KNEES: ICD-10-CM

## 2021-12-06 DIAGNOSIS — N95.2 VAGINAL ATROPHY: ICD-10-CM

## 2021-12-06 RX ORDER — IBUPROFEN 600 MG/1
600 TABLET ORAL 3 TIMES DAILY
Qty: 30 TABLET | Refills: 0 | Status: SHIPPED | OUTPATIENT
Start: 2021-12-06 | End: 2022-06-20

## 2021-12-06 RX ORDER — ESTRADIOL 0.1 MG/G
1 CREAM VAGINAL
Qty: 42.5 G | Refills: 3 | Status: SHIPPED | OUTPATIENT
Start: 2021-12-06 | End: 2022-08-17

## 2021-12-06 RX ORDER — ESTRADIOL 0.1 MG/G
1 CREAM VAGINAL
Qty: 42.5 G | Refills: 3 | Status: CANCELLED | OUTPATIENT
Start: 2021-12-06 | End: 2022-12-05

## 2021-12-09 ENCOUNTER — IMMUNIZATION (OUTPATIENT)
Dept: INTERNAL MEDICINE | Facility: CLINIC | Age: 61
End: 2021-12-09
Payer: COMMERCIAL

## 2021-12-09 DIAGNOSIS — Z23 NEED FOR VACCINATION: Primary | ICD-10-CM

## 2021-12-09 PROCEDURE — 0004A COVID-19, MRNA, LNP-S, PF, 30 MCG/0.3 ML DOSE VACCINE: CPT | Mod: PBBFAC | Performed by: INTERNAL MEDICINE

## 2021-12-29 ENCOUNTER — TELEPHONE (OUTPATIENT)
Dept: UROGYNECOLOGY | Facility: CLINIC | Age: 61
End: 2021-12-29
Payer: COMMERCIAL

## 2022-01-11 ENCOUNTER — LAB VISIT (OUTPATIENT)
Dept: PRIMARY CARE CLINIC | Facility: CLINIC | Age: 62
End: 2022-01-11
Payer: COMMERCIAL

## 2022-01-11 DIAGNOSIS — Z20.822 CONTACT WITH AND (SUSPECTED) EXPOSURE TO COVID-19: ICD-10-CM

## 2022-01-11 LAB
CTP QC/QA: YES
SARS-COV-2 AG RESP QL IA.RAPID: NEGATIVE

## 2022-01-11 PROCEDURE — 87811 SARS-COV-2 COVID19 W/OPTIC: CPT

## 2022-01-12 ENCOUNTER — PATIENT MESSAGE (OUTPATIENT)
Dept: ADMINISTRATIVE | Facility: OTHER | Age: 62
End: 2022-01-12
Payer: COMMERCIAL

## 2022-01-17 ENCOUNTER — TELEPHONE (OUTPATIENT)
Dept: INTERNAL MEDICINE | Facility: CLINIC | Age: 62
End: 2022-01-17
Payer: COMMERCIAL

## 2022-01-17 DIAGNOSIS — Z12.31 ENCOUNTER FOR SCREENING MAMMOGRAM FOR BREAST CANCER: Primary | ICD-10-CM

## 2022-02-17 ENCOUNTER — HOSPITAL ENCOUNTER (OUTPATIENT)
Dept: RADIOLOGY | Facility: HOSPITAL | Age: 62
Discharge: HOME | End: 2022-02-17
Attending: INTERNAL MEDICINE
Payer: COMMERCIAL

## 2022-02-17 DIAGNOSIS — Z12.31 ENCOUNTER FOR SCREENING MAMMOGRAM FOR BREAST CANCER: ICD-10-CM

## 2022-02-17 PROCEDURE — 77063 BREAST TOMOSYNTHESIS BI: CPT | Mod: 26,,, | Performed by: RADIOLOGY

## 2022-02-17 PROCEDURE — 77063 BREAST TOMOSYNTHESIS BI: CPT | Mod: TC

## 2022-02-17 PROCEDURE — 77067 SCR MAMMO BI INCL CAD: CPT | Mod: TC

## 2022-02-17 PROCEDURE — 77063 MAMMO DIGITAL SCREENING BILAT WITH TOMO: ICD-10-PCS | Mod: 26,,, | Performed by: RADIOLOGY

## 2022-02-17 PROCEDURE — 77067 MAMMO DIGITAL SCREENING BILAT WITH TOMO: ICD-10-PCS | Mod: 26,,, | Performed by: RADIOLOGY

## 2022-02-17 PROCEDURE — 77067 SCR MAMMO BI INCL CAD: CPT | Mod: 26,,, | Performed by: RADIOLOGY

## 2022-02-18 NOTE — PROGRESS NOTES
Greetings    I have reviewed the results of your mammogram and everything looks normal. Please don't hesitate to reach out to me if you have any questions regarding the test. We and repeat this in one year. Have a great day!

## 2022-03-27 ENCOUNTER — PATIENT MESSAGE (OUTPATIENT)
Dept: UROGYNECOLOGY | Facility: CLINIC | Age: 62
End: 2022-03-27
Payer: COMMERCIAL

## 2022-04-01 ENCOUNTER — PATIENT MESSAGE (OUTPATIENT)
Dept: UROGYNECOLOGY | Facility: CLINIC | Age: 62
End: 2022-04-01
Payer: COMMERCIAL

## 2022-04-17 ENCOUNTER — PATIENT MESSAGE (OUTPATIENT)
Dept: INTERNAL MEDICINE | Facility: CLINIC | Age: 62
End: 2022-04-17
Payer: COMMERCIAL

## 2022-04-21 ENCOUNTER — IMMUNIZATION (OUTPATIENT)
Dept: INTERNAL MEDICINE | Facility: CLINIC | Age: 62
End: 2022-04-21
Payer: COMMERCIAL

## 2022-04-21 DIAGNOSIS — Z23 NEED FOR VACCINATION: Primary | ICD-10-CM

## 2022-04-21 PROCEDURE — 91305 COVID-19, MRNA, LNP-S, PF, 30 MCG/0.3 ML DOSE VACCINE (PFIZER): CPT | Mod: PBBFAC | Performed by: FAMILY MEDICINE

## 2022-05-04 ENCOUNTER — PATIENT MESSAGE (OUTPATIENT)
Dept: UROGYNECOLOGY | Facility: CLINIC | Age: 62
End: 2022-05-04
Payer: COMMERCIAL

## 2022-05-20 ENCOUNTER — PATIENT MESSAGE (OUTPATIENT)
Dept: UROGYNECOLOGY | Facility: CLINIC | Age: 62
End: 2022-05-20
Payer: COMMERCIAL

## 2022-08-17 ENCOUNTER — OFFICE VISIT (OUTPATIENT)
Dept: UROGYNECOLOGY | Facility: CLINIC | Age: 62
End: 2022-08-17
Payer: COMMERCIAL

## 2022-08-17 VITALS
BODY MASS INDEX: 30.52 KG/M2 | SYSTOLIC BLOOD PRESSURE: 110 MMHG | DIASTOLIC BLOOD PRESSURE: 80 MMHG | WEIGHT: 183.19 LBS | HEIGHT: 65 IN

## 2022-08-17 DIAGNOSIS — N95.2 VAGINAL ATROPHY: ICD-10-CM

## 2022-08-17 DIAGNOSIS — Z01.419 WELL WOMAN EXAM: Primary | ICD-10-CM

## 2022-08-17 PROCEDURE — 99396 PR PREVENTIVE VISIT,EST,40-64: ICD-10-PCS | Mod: S$GLB,,, | Performed by: NURSE PRACTITIONER

## 2022-08-17 PROCEDURE — 1160F RVW MEDS BY RX/DR IN RCRD: CPT | Mod: CPTII,S$GLB,, | Performed by: NURSE PRACTITIONER

## 2022-08-17 PROCEDURE — 3008F BODY MASS INDEX DOCD: CPT | Mod: CPTII,S$GLB,, | Performed by: NURSE PRACTITIONER

## 2022-08-17 PROCEDURE — 3008F PR BODY MASS INDEX (BMI) DOCUMENTED: ICD-10-PCS | Mod: CPTII,S$GLB,, | Performed by: NURSE PRACTITIONER

## 2022-08-17 PROCEDURE — 1160F PR REVIEW ALL MEDS BY PRESCRIBER/CLIN PHARMACIST DOCUMENTED: ICD-10-PCS | Mod: CPTII,S$GLB,, | Performed by: NURSE PRACTITIONER

## 2022-08-17 PROCEDURE — 3074F PR MOST RECENT SYSTOLIC BLOOD PRESSURE < 130 MM HG: ICD-10-PCS | Mod: CPTII,S$GLB,, | Performed by: NURSE PRACTITIONER

## 2022-08-17 PROCEDURE — 3079F PR MOST RECENT DIASTOLIC BLOOD PRESSURE 80-89 MM HG: ICD-10-PCS | Mod: CPTII,S$GLB,, | Performed by: NURSE PRACTITIONER

## 2022-08-17 PROCEDURE — 3079F DIAST BP 80-89 MM HG: CPT | Mod: CPTII,S$GLB,, | Performed by: NURSE PRACTITIONER

## 2022-08-17 PROCEDURE — 99999 PR PBB SHADOW E&M-EST. PATIENT-LVL III: CPT | Mod: PBBFAC,,, | Performed by: NURSE PRACTITIONER

## 2022-08-17 PROCEDURE — 99999 PR PBB SHADOW E&M-EST. PATIENT-LVL III: ICD-10-PCS | Mod: PBBFAC,,, | Performed by: NURSE PRACTITIONER

## 2022-08-17 PROCEDURE — 1159F MED LIST DOCD IN RCRD: CPT | Mod: CPTII,S$GLB,, | Performed by: NURSE PRACTITIONER

## 2022-08-17 PROCEDURE — 99396 PREV VISIT EST AGE 40-64: CPT | Mod: S$GLB,,, | Performed by: NURSE PRACTITIONER

## 2022-08-17 PROCEDURE — 1159F PR MEDICATION LIST DOCUMENTED IN MEDICAL RECORD: ICD-10-PCS | Mod: CPTII,S$GLB,, | Performed by: NURSE PRACTITIONER

## 2022-08-17 PROCEDURE — 3074F SYST BP LT 130 MM HG: CPT | Mod: CPTII,S$GLB,, | Performed by: NURSE PRACTITIONER

## 2022-08-17 NOTE — PROGRESS NOTES
08/17/2022    SUBJECTIVE:   62 y.o. female for annual exam.    OPERATIVE NOTE  07/06/2020  Title of Operation:  1)  Robotic-assisted total laparoscopic hysterectomy with bilateral salpingo-oophorectomy  2)  Robotic-assisted sacral colpopexy with polypropylene mesh  3)  Placement of retropubic tension-free midurethral sling, Advantage Fit (Mobilisafe)  4)  Cystourethroscopy     Indications for Surgery:     Ohs Peq [id number]      Question 3/21/2020 10:43 AM CDT - Filed by Patient on 3/21/2020   Do you...     Usually experience pressure in the lower abdomen? Symptoms not present   Usually experience heaviness or dullness in the pelvic area? Symptoms not present   Usually have a bulge or something falling out that you can see or feel in your vaginal area? Symptoms present and they bother me somewhat; present slightly past introitus; mostly noted when wiping; no major bother symptoms--has been doing Kegels a lot, which is helping   Ever have to push on the vagina or around the rectum to complete a bowel movement? Symptoms not present   Usually experience a feeling of incomplete bladder emptying? Symptoms not present   Ever have to push up on a bulge in the vaginal area with your fingers to start or complete urination? Symptoms not present   Do you...     Feel you need to strain too hard to have a bowel movement? Symptoms not present   Feel you have not completely emptied your bowels at the end of a bowel movement?  Symptoms not present   Usually lose stool beyond your control if your stool is well formed? Symptoms not present   Usually lose stool beyond your control if your stool is loose? Symptoms not present   Usually lose gas from your rectum beyond your control? Symptoms not present   Usually have pain when you pass your stool? Symptoms not present   Experience a strong sense of urgency and have to rush to the bathroom to have a bowel movement? Symptoms not present   Does part of your bowel ever pass through the  rectum and bulge outside during or after a bowel movement? Symptoms not present   Do you...      Usually experience frequent urination? Symptoms not present   Usually experience urine leakage associated with a feeling of urgency, that is, a strong sensation of needing to go to the bathroom? Symptoms not present   Usually experience urine leakage related to coughing, sneezing or laughing? Symptoms not present   Usually experience small amounts of urine leakage (that is, drops)? Symptoms present but they do not bother me at all   Usually experience difficulty emptying your bladder? Symptoms present but they do not bother me at all; PV to help   Usually experience pain or discomfort in the lower abdomen or genital region? Symptoms not present   POPDI  (range: 0 - 100) 8.33   CRADI (range: 0 - 100) 0   KEYANA (range: 0 - 100) 8.33   TOTAL SCORE  (range: 0 - 300) 16.66   Ohs Peq Urogyn Hpi      Question 3/21/2020 10:56 AM CDT - Filed by Patient on 3/21/2020   General Urogynecology: Are you experiencing the following?     Dysuria (painful urination) No   Nocturia:  waking up at night to empty your bladder  Yes   If you answered yes to the previous question, how many times does this happen per night? 1   Enuresis (urine loss during sleep) No   Dribbling urine after you urinate No   Hematuria (urine appears red) Yes   Type of stream Strong   Urinary frequency: How often a day are you going to the bathroom per day?  Less than 10; Q4h   Urinary Tract Infections: How many Urinary Tract Infections have you had in the past year? I have not had a UTI in the past year   If you have had a UTI in the past year, what treatments have you had so far?  I have not had a UTI in the past year   Urinary Incontinence (General): Are you experiencing the following?     Past consultation for incontinence: Have you ever seen someone for the evaluation of incontinence? No   If you answered yes to the previous question, please select all the  "therapies you have tried.  N/a- I answered no to the previous question   Please note the effectiveness of the therapies.     Need to wear protection to keep clothes dry  No   If you answered yes to the previous question, please mathew the protection you use.  None   If you wear protection, how much wetness is typically on each pad? N/a- I do not wear protection   If you wear protection, how often do you have to change per day, if applicable?  0   Stress Symptoms: Are you experiencing the following?     Leakage of urine with cough, laugh and/or sneeze No   If you answered yes to the previous question, what is the frequency in days, weeks and/or months? Never   Leakage of urine with sex No   Leakage of urine with bending/ lifting No   Leakage of urine with briskly walking or jogging No   If you lose urine for any other reason not previously mentioned, please note it below, if applicable.      Urge Symptoms: Are you experiencing the following?     Urgency ("got to go" feeling) No   Urge: How frequently do you feel an urge to urinate (feeling like you "gotta go" to the bathroom and can't wait) Never   Do you experience a leakage of urine when you have a feeling of urgency?  No   Leakage of urine when unaware No   Past use of anticholinergics (medications used to treat overactive bladder) No   If you answered yes to the previous question, please mathew the anticholinergics you have used:      Have you ever used Mirbetriq (aka Mirabegron)?  No   Prolapse Symptoms: Are you experiencing any of the following?      Falling out/ Bulging/ Heaviness in the vagina Yes   Vaginal/ Abdominal Pain/ Pressure No   Need to strain/ Push to void No   Need to wait on the toilet before you void Yes   Unusual position to urinate (using your hands to push back the vaginal bulge) Yes   Sensation of incomplete emptying No   Past use of pessary device No   If you answered yes to the previous question, please list the devices you have used below.    "   Bowel Symptoms: Are you experiencing any of the following?     Constipation No   Diarrhea  No   Hematochezia (bloody stool) No   Incomplete evacuation of stool No   Involuntary loss of formed stool No   Fecal smearing/urgency No   Involuntary loss of gas No   Vaginal Symptoms: Are you experiencing any of the following?      Abnormal vaginal bleeding  Yes: see below   Vaginal dryness No   Sexually active  Yes   Dyspareunia (painful intercourse) No   Estrogen use  No   Ohs Peq Pelvic Pain Urogyn      Question 3/21/2020 10:56 AM CDT - Filed by Patient on 3/21/2020   Are you experiencing pelvic pain?  No      2)   bleeding/spotting:  --x 1 in 12/2019; mild spotting; pre-coital spotting x 1  --told PCP  --pelvic US 2/21/2020:  Uterus:  Size: 6.8 x 2.9 x 5.1 cm  Masses: There is a small uterine fibroid, intramural, less than 1 cm.  Endometrium: Thickened in this post menopausal patient, measuring 6 mm.  There is a an oval isoechoic mass within the cervix measuring 0.6 x 0.6 x 0.7 cm with smooth contour.  The bilateral ovaries were not seen.  The bilateral adnexa appear normal.  Free Fluid:  Trace of free fluid with septation.     03/25/2020  embx  SMALL FRAGMENTS OF INACTIVE ENDOMETRIUM AND BLOOD CLOT  Cervical polyp  BENIGN INFLAMED ENDOCERVICAL POLYP     [facility]  06/10/2020     Urine dipstick: neg.     1.  VOIDING PHASE:       a.  Uroflowmetry:  · Prolapse reduction: No  · Voided volume:  578 mL   · Voiding time:   43 seconds  · Max flow:  42 mL/s  · Avg flow:   13 mL/s   · PVR:   50 mL     The overall configuration of this uroflow study was normal.       b.  Pressure flow:  · Prolapse reduction: No  · Voided volume:   628 mL  · Voiding time:  70 seconds  · Peak flow:  49 mL/s   · Avg flow:  11 mL/s  · Max det pressure:  62  cm H20  · Det pressure at max flow: 22 cm H20  · Void initiated by detrusor contraction.    · Urethral relaxation (EMG):  absent.    · PVR (calculated):  0 mL     The overall configuration of this  pressure flow study was prolonged but normal.       2.  FILLING PHASE:  · 1st desire: 96 mL  · Normal desire:  257 mL  · Strong desire:  296 mL  · Urgency:  413 mL  · Compliance (calculated)  approx 150 mL/cm H20  · EMG activity during filling:  stable  · Detrusor contractions observed: No.       3.  URETHRAL FUNCTION/STORAGE PHASE:     a.  WITH prolapse reduction:  · CLPP (150 mL): Negative  at  169 cm H20  · VLPP (150 mL): Negative  at  111 cm H20  · CLPP (300 mL): Negative  at  167 cm H20  · VLPP (300 mL): Negative  at  89 cm H20   · CLPP (MAX ):    Negative  at  147 cm H20  · VLPP (MAX):     Negative  at  89 cm H20  · POS CLPP at max cap once pves catheter removed.      These findings are consistent with Positive urodynamic stress incontinence with cough at max cap once pves catheter removed.     Assessment:  UF normal.  PF prolonged but normal.  Compliance normal.  Max capacity 628 mL.  DO (--).  PROMISE (+).        Cysto  Assessment: Essentially normal cystourethroscopy with mild decrease in urethral coaptation.       Preoperative Diagnosis:  1. Postmenopausal bleeding    2. Cystocele with prolapse    3. Abnormal pelvic ultrasound    4. Rectocele, female    5. Uterovaginal prolapse    6. Cervical polyp    7. Urge urinary incontinence    8.     Urodynamic stress incontinence  9.     Decreased urethral coaptation, mild     Postoperative Diagnosis:  1. Postmenopausal bleeding    2. Cystocele with prolapse    3. Abnormal pelvic ultrasound    4. Rectocele, female    5. Uterovaginal prolapse    6. Cervical polyp    7. Urge urinary incontinence    8.     Urodynamic stress incontinence  9.     Decreased urethral coaptation, mild          Past Medical History:   Diagnosis Date    Ganglion cyst     History of shingles     Hyperlipidemia     Kidney stone     Otitis media     PONV (postoperative nausea and vomiting)     Sciatica     Urinary tract infection        Past Surgical History:   Procedure Laterality Date     COLONOSCOPY N/A 2018    Procedure: COLONOSCOPY Golytely;  Surgeon: Deya Harrington MD;  Location: [facility];  Service: Endoscopy;  Laterality: N/A;    CYSTOSCOPY N/A 2020    Procedure: CYSTOSCOPY;  Surgeon: Corey Riley MD;  Location: [facility];  Service: OB/GYN;  Laterality: N/A;    GANGLION CYST EXCISION      HYSTERECTOMY      INSERTION OF MIDURETHRAL SLING N/A 2020    Procedure: SLING, MIDURETHRAL;  Surgeon: Corey Riley MD;  Location: [facility];  Service: OB/GYN;  Laterality: N/A;    LAPAROSCOPIC SALPINGO-OOPHORECTOMY N/A 2020    Procedure: SALPINGO-OOPHORECTOMY, LAPAROSCOPIC;  Surgeon: Corey Riley MD;  Location: [facility];  Service: OB/GYN;  Laterality: N/A;    OOPHORECTOMY      ROBOT-ASSISTED LAPAROSCOPIC ABDOMINAL SACROCOLPOPEXY N/A 2020    Procedure: ROBOTIC SACROCOLPOPEXY, ABDOMEN;  Surgeon: Corey Riley MD;  Location: [facility];  Service: OB/GYN;  Laterality: N/A;    ROBOT-ASSISTED LAPAROSCOPIC HYSTERECTOMY N/A 2020    Procedure: ROBOTIC HYSTERECTOMY;  Surgeon: Corey Riley MD;  Location: [facility];  Service: OB/GYN;  Laterality: N/A;    TUBAL LIGATION  1998    urethra widened         Family History   Problem Relation Age of Onset    Cancer Maternal Grandmother         breast    Breast cancer Maternal Grandmother     Dementia Mother     Depression Father     Kidney disease Neg Hx     Thyroid disease Neg Hx        Social History     Socioeconomic History    Marital status:    Tobacco Use    Smoking status: Former Smoker     Packs/day: 2.00     Types: Cigarettes     Quit date: 10/24/2008     Years since quittin.8    Smokeless tobacco: Never Used   Substance and Sexual Activity    Alcohol use: No    Drug use: No    Sexual activity: Yes     Partners: Male       Current Outpatient Medications   Medication Sig Dispense Refill    ibuprofen (ADVIL,MOTRIN) 600 MG tablet TAKE 1 TABLET BY MOUTH THREE TIMES A DAY 30 tablet 6    [START ON  "2022] conjugated estrogens (PREMARIN) vaginal cream Place 0.5 g vaginally twice a week. 30 g 2     No current facility-administered medications for this visit.       Review of patient's allergies indicates:   Allergen Reactions    Bananas [banana]     Cantaloupe     Cucumbers [cucumber fruit extract]     Hernando     Watermelon        No LMP recorded (lmp unknown). Patient has had a hysterectomy.    Well Woman:  Pap test: post hysterectomy , normal/HPV neg.  History of abnormal paps: No.  History of STIs:  No  Mammogram: Date of last: 2022  Result: Normal  Colonoscopy: Date of last: .  Result:  Polyps, benign.  Repeat due:  .    DEXA:  Date of last: .  Result:  normal.  Repeat due:  63 yo.        OB History        6    Para   3    Term   3            AB        Living   3       SAB        IAB        Ectopic        Multiple        Live Births                       ROS:  Feeling well.   No dyspnea or chest pain on exertion.    No abdominal pain, change in bowel habits, black or bloody stools.    No urinary tract symptoms.   GYN ROS: no breast pain or new or enlarging lumps on self exam, no vaginal bleeding. No neurological complaints.    OBJECTIVE:   The patient appears well, alert, oriented x 3, in no distress.  /80 (BP Location: Left arm, Patient Position: Sitting, BP Method: Medium (Manual))   Ht 5' 5" (1.651 m)   Wt 83.1 kg (183 lb 3.2 oz)   LMP  (LMP Unknown)   BMI 30.49 kg/m²   ENT normal.  Neck supple. No adenopathy or thyromegaly. JAYDON.   Normal respiratory effort  Pulse with  regular rate and rhythm.   Abdomen soft without tenderness, guarding, mass or organomegaly.   Extremities show no edema, normal peripheral pulses.   Neurological is normal, no focal findings.    BREAST EXAM: breasts appear normal, no suspicious masses, no skin or nipple changes or axillary nodes    PELVIC EXAM:   VULVA: normal appearing vulva with no masses, tenderness or lesions, "   VAGINA: normal appearing vagina with normal color and discharge, no lesions, atrophic,  CERVIX: absebt,   UTERUS: absent,   ADNEXA: no masses,   RECTAL: normal rectal, no masses    ASSESSMENT:   1. Well woman exam     2. Vaginal atrophy  conjugated estrogens (PREMARIN) vaginal cream       PLAN:   1. Well woman  -- up to date  --no lifting > 50 pounds without assistance    2. Vaginal atrophy (dryness):  Use 0.5 gram of estrogen cream in vagina twice a week     3. RTC 1 year for annual      30 minutes were spent in face to face time with this patient  90 % of this time was spent in counseling and/or coordination of care    Mag RAMASY Marchand [facility]  Division of Female Pelvic Medicine and Reconstructive Surgery  Department of Obstetrics & Gynecology

## 2022-08-17 NOTE — PATIENT INSTRUCTIONS
1. Well woman  -- up to date  --no lifting > 50 pounds without assistance    2. Vaginal atrophy (dryness):  Use 0.5 gram of estrogen cream in vagina twice a week     3. RTC 1 year for annual

## 2022-08-20 ENCOUNTER — PATIENT MESSAGE (OUTPATIENT)
Dept: UROGYNECOLOGY | Facility: CLINIC | Age: 62
End: 2022-08-20
Payer: COMMERCIAL

## 2022-09-12 ENCOUNTER — PATIENT MESSAGE (OUTPATIENT)
Dept: UROGYNECOLOGY | Facility: CLINIC | Age: 62
End: 2022-09-12
Payer: COMMERCIAL

## 2022-09-20 ENCOUNTER — PATIENT MESSAGE (OUTPATIENT)
Dept: UROGYNECOLOGY | Facility: CLINIC | Age: 62
End: 2022-09-20
Payer: COMMERCIAL

## 2022-09-21 ENCOUNTER — PATIENT MESSAGE (OUTPATIENT)
Dept: UROGYNECOLOGY | Facility: CLINIC | Age: 62
End: 2022-09-21
Payer: COMMERCIAL

## 2022-10-17 ENCOUNTER — OFFICE VISIT (OUTPATIENT)
Dept: INTERNAL MEDICINE | Facility: CLINIC | Age: 62
End: 2022-10-17
Payer: COMMERCIAL

## 2022-10-17 VITALS
BODY MASS INDEX: 31.11 KG/M2 | OXYGEN SATURATION: 98 % | HEART RATE: 70 BPM | DIASTOLIC BLOOD PRESSURE: 84 MMHG | SYSTOLIC BLOOD PRESSURE: 128 MMHG | HEIGHT: 65 IN | WEIGHT: 186.75 LBS

## 2022-10-17 DIAGNOSIS — E66.09 CLASS 1 OBESITY DUE TO EXCESS CALORIES WITH SERIOUS COMORBIDITY AND BODY MASS INDEX (BMI) OF 31.0 TO 31.9 IN ADULT: ICD-10-CM

## 2022-10-17 DIAGNOSIS — R63.8 DIFFICULTY MAINTAINING WEIGHT: ICD-10-CM

## 2022-10-17 DIAGNOSIS — Z00.00 PREVENTATIVE HEALTH CARE: Primary | ICD-10-CM

## 2022-10-17 PROCEDURE — 99999 PR PBB SHADOW E&M-EST. PATIENT-LVL IV: ICD-10-PCS | Mod: PBBFAC,,, | Performed by: INTERNAL MEDICINE

## 2022-10-17 PROCEDURE — 1160F RVW MEDS BY RX/DR IN RCRD: CPT | Mod: CPTII,S$GLB,, | Performed by: INTERNAL MEDICINE

## 2022-10-17 PROCEDURE — 3079F DIAST BP 80-89 MM HG: CPT | Mod: CPTII,S$GLB,, | Performed by: INTERNAL MEDICINE

## 2022-10-17 PROCEDURE — 99396 PR PREVENTIVE VISIT,EST,40-64: ICD-10-PCS | Mod: 25,S$GLB,, | Performed by: INTERNAL MEDICINE

## 2022-10-17 PROCEDURE — 1159F MED LIST DOCD IN RCRD: CPT | Mod: CPTII,S$GLB,, | Performed by: INTERNAL MEDICINE

## 2022-10-17 PROCEDURE — 3079F PR MOST RECENT DIASTOLIC BLOOD PRESSURE 80-89 MM HG: ICD-10-PCS | Mod: CPTII,S$GLB,, | Performed by: INTERNAL MEDICINE

## 2022-10-17 PROCEDURE — 99396 PREV VISIT EST AGE 40-64: CPT | Mod: 25,S$GLB,, | Performed by: INTERNAL MEDICINE

## 2022-10-17 PROCEDURE — 99999 PR PBB SHADOW E&M-EST. PATIENT-LVL IV: CPT | Mod: PBBFAC,,, | Performed by: INTERNAL MEDICINE

## 2022-10-17 PROCEDURE — 3074F PR MOST RECENT SYSTOLIC BLOOD PRESSURE < 130 MM HG: ICD-10-PCS | Mod: CPTII,S$GLB,, | Performed by: INTERNAL MEDICINE

## 2022-10-17 PROCEDURE — 3074F SYST BP LT 130 MM HG: CPT | Mod: CPTII,S$GLB,, | Performed by: INTERNAL MEDICINE

## 2022-10-17 PROCEDURE — 1160F PR REVIEW ALL MEDS BY PRESCRIBER/CLIN PHARMACIST DOCUMENTED: ICD-10-PCS | Mod: CPTII,S$GLB,, | Performed by: INTERNAL MEDICINE

## 2022-10-17 PROCEDURE — 1159F PR MEDICATION LIST DOCUMENTED IN MEDICAL RECORD: ICD-10-PCS | Mod: CPTII,S$GLB,, | Performed by: INTERNAL MEDICINE

## 2022-10-17 RX ORDER — PEN NEEDLE, DIABETIC 30 GX3/16"
1 NEEDLE, DISPOSABLE MISCELLANEOUS DAILY
Qty: 100 EACH | Refills: 1 | Status: SHIPPED | OUTPATIENT
Start: 2022-10-17 | End: 2023-07-19

## 2022-10-17 RX ORDER — SEMAGLUTIDE 1.34 MG/ML
0.25 INJECTION, SOLUTION SUBCUTANEOUS
Qty: 2 PEN | Refills: 1 | Status: SHIPPED | OUTPATIENT
Start: 2022-10-17 | End: 2023-07-19

## 2022-10-17 NOTE — PROGRESS NOTES
"Assessment:       1. Preventative health care    2. Class 1 obesity due to excess calories with serious comorbidity and body mass index (BMI) of 31.0 to 31.9 in adult    3. Difficulty maintaining weight          Plan:         Mari was seen today for annual exam.    Diagnoses and all orders for this visit:    Preventative health care    Class 1 obesity due to excess calories with serious comorbidity and body mass index (BMI) of 31.0 to 31.9 in adult  -     semaglutide (OZEMPIC) 0.25 mg or 0.5 mg(2 mg/1.5 mL) pen injector; Inject 0.25 mg into the skin every 7 days.  -     pen needle, diabetic (BD ULTRA-FINE CAROLYNN PEN NEEDLE) 32 gauge x 5/32" Ndle; 1 each by Misc.(Non-Drug; Combo Route) route once daily.    Difficulty maintaining weight  -     pen needle, diabetic (BD ULTRA-FINE CAROLYNN PEN NEEDLE) 32 gauge x 5/32" Ndle; 1 each by Misc.(Non-Drug; Combo Route) route once daily.        Subjective:       Patient ID: Mari Ambrocio is a 62 y.o. female.    Chief Complaint: Annual Exam    Reports that she stopped walkign 2 mi a day, lots of stuff happening this year, mother with late stage dementia, dog diet  and relationship problems, has been doing a lot of fruit snack gummines but avoid SSB and no fast food       HPI    Review of Systems   Constitutional:  Negative for activity change and unexpected weight change.   HENT:  Negative for hearing loss, rhinorrhea and trouble swallowing.    Eyes:  Negative for discharge and visual disturbance.   Respiratory:  Negative for chest tightness and wheezing.    Cardiovascular:  Negative for chest pain and palpitations.   Gastrointestinal:  Negative for blood in stool, constipation, diarrhea and vomiting.   Endocrine: Negative for polydipsia and polyuria.   Genitourinary:  Negative for difficulty urinating, dysuria, hematuria and menstrual problem.   Musculoskeletal:  Negative for arthralgias, joint swelling and neck pain.   Neurological:  Negative for weakness and headaches. " "  Psychiatric/Behavioral:  Negative for confusion and dysphoric mood.              Health Maintenance Due   Topic Date Due    Shingles Vaccine (1 of 2) Never done    COVID-19 Vaccine (5 - Booster for Pfizer series) 06/16/2022    Influenza Vaccine (1) 09/01/2022     Done with [facility] FLU shot     Objective:     /84 (BP Location: Left arm, Patient Position: Sitting, BP Method: Large (Manual))   Pulse 70   Ht 5' 5" (1.651 m)   Wt 84.7 kg (186 lb 11.7 oz)   LMP  (LMP Unknown)   SpO2 98%   BMI 31.07 kg/m²     Vitals 10/17/2022 8/17/2022 8/4/2021 7/7/2021 1/6/2021   Height 65 65 65 65 64   Weight (lbs) 186.73 183.2 167.77 171.96 176.15   BMI (kg/m2) 31.1 30.5 27.9 28.6 30.2        Physical Exam  Vitals and nursing note reviewed.   Constitutional:       General: She is not in acute distress.     Appearance: She is well-developed. She is not diaphoretic.   HENT:      Head: Normocephalic.      Nose: Nose normal.   Eyes:      General:         Right eye: No discharge.         Left eye: No discharge.      Conjunctiva/sclera: Conjunctivae normal.      Pupils: Pupils are equal, round, and reactive to light.   Cardiovascular:      Rate and Rhythm: Normal rate and regular rhythm.      Heart sounds: Normal heart sounds. No murmur heard.    No friction rub. No gallop.   Pulmonary:      Effort: Pulmonary effort is normal. No respiratory distress.   Abdominal:      General: Bowel sounds are normal. There is no distension.      Palpations: Abdomen is soft.   Musculoskeletal:         General: No deformity. Normal range of motion.      Cervical back: Normal range of motion.   Skin:     General: Skin is warm.   Neurological:      Mental Status: She is alert and oriented to person, place, and time.      Cranial Nerves: No cranial nerve deficit.           Future Appointments   Date Time Provider Department Center   11/22/2022  1:00 PM Addie Mckeon [facility]   11/22/2022  1:40 PM Piper Suarez MD [facility] " "[facility]   1/17/2023 11:40 AM MD EMIR Johnston III  [place]   10/17/2023  8:20 AM MD EMIR Johnston III [facility] [place]         Medication List with Changes/Refills   New Medications    PEN NEEDLE, DIABETIC (BD ULTRA-FINE CAROLYNN PEN NEEDLE) 32 GAUGE X 5/32" NDLE    1 each by Misc.(Non-Drug; Combo Route) route once daily.    SEMAGLUTIDE (OZEMPIC) 0.25 MG OR 0.5 MG(2 MG/1.5 ML) PEN INJECTOR    Inject 0.25 mg into the skin every 7 days.   Current Medications    CONJUGATED ESTROGENS (PREMARIN) VAGINAL CREAM    Place 0.5 g vaginally twice a week.    IBUPROFEN (ADVIL,MOTRIN) 600 MG TABLET    TAKE 1 TABLET BY MOUTH THREE TIMES A DAY         Disclaimer:  This note has been generated using voice-recognition software. There may be grammatical or spelling errors that have been missed during proof-reading       "

## 2022-10-17 NOTE — PATIENT INSTRUCTIONS
We were happy to see you today      For your Medication   Ozempic  Adipex  Buproprion   For more information about side effects please visit medlineplus.gov      Please return to clinic in    Follow up for 1 year for annual.

## 2022-10-18 ENCOUNTER — TELEPHONE (OUTPATIENT)
Dept: PHARMACY | Facility: CLINIC | Age: 62
End: 2022-10-18
Payer: COMMERCIAL

## 2022-10-18 ENCOUNTER — PATIENT MESSAGE (OUTPATIENT)
Dept: INTERNAL MEDICINE | Facility: CLINIC | Age: 62
End: 2022-10-18
Payer: COMMERCIAL

## 2022-10-20 ENCOUNTER — TELEPHONE (OUTPATIENT)
Dept: FAMILY MEDICINE | Facility: CLINIC | Age: 62
End: 2022-10-20
Payer: COMMERCIAL

## 2022-10-20 DIAGNOSIS — Z12.31 BREAST CANCER SCREENING BY MAMMOGRAM: Primary | ICD-10-CM

## 2022-11-07 ENCOUNTER — OFFICE VISIT (OUTPATIENT)
Dept: FAMILY MEDICINE | Facility: CLINIC | Age: 62
End: 2022-11-07
Payer: COMMERCIAL

## 2022-11-07 VITALS
HEIGHT: 65 IN | DIASTOLIC BLOOD PRESSURE: 82 MMHG | WEIGHT: 184.31 LBS | BODY MASS INDEX: 30.71 KG/M2 | SYSTOLIC BLOOD PRESSURE: 130 MMHG | HEART RATE: 64 BPM | OXYGEN SATURATION: 98 % | TEMPERATURE: 98 F

## 2022-11-07 DIAGNOSIS — R05.9 COUGH, UNSPECIFIED TYPE: ICD-10-CM

## 2022-11-07 DIAGNOSIS — R09.81 SINUS CONGESTION: ICD-10-CM

## 2022-11-07 DIAGNOSIS — J01.00 ACUTE MAXILLARY SINUSITIS, RECURRENCE NOT SPECIFIED: Primary | ICD-10-CM

## 2022-11-07 LAB
CTP QC/QA: YES
FLUAV AG NPH QL: NEGATIVE
FLUBV AG NPH QL: NEGATIVE

## 2022-11-07 PROCEDURE — 99214 PR OFFICE/OUTPT VISIT, EST, LEVL IV, 30-39 MIN: ICD-10-PCS | Mod: S$GLB,,, | Performed by: FAMILY MEDICINE

## 2022-11-07 PROCEDURE — 1159F PR MEDICATION LIST DOCUMENTED IN MEDICAL RECORD: ICD-10-PCS | Mod: CPTII,S$GLB,, | Performed by: FAMILY MEDICINE

## 2022-11-07 PROCEDURE — 3079F PR MOST RECENT DIASTOLIC BLOOD PRESSURE 80-89 MM HG: ICD-10-PCS | Mod: CPTII,S$GLB,, | Performed by: FAMILY MEDICINE

## 2022-11-07 PROCEDURE — 1159F MED LIST DOCD IN RCRD: CPT | Mod: CPTII,S$GLB,, | Performed by: FAMILY MEDICINE

## 2022-11-07 PROCEDURE — 3079F DIAST BP 80-89 MM HG: CPT | Mod: CPTII,S$GLB,, | Performed by: FAMILY MEDICINE

## 2022-11-07 PROCEDURE — 99999 PR PBB SHADOW E&M-EST. PATIENT-LVL III: CPT | Mod: PBBFAC,,, | Performed by: FAMILY MEDICINE

## 2022-11-07 PROCEDURE — 3075F SYST BP GE 130 - 139MM HG: CPT | Mod: CPTII,S$GLB,, | Performed by: FAMILY MEDICINE

## 2022-11-07 PROCEDURE — 87804 INFLUENZA ASSAY W/OPTIC: CPT | Mod: QW,S$GLB,, | Performed by: FAMILY MEDICINE

## 2022-11-07 PROCEDURE — 3075F PR MOST RECENT SYSTOLIC BLOOD PRESS GE 130-139MM HG: ICD-10-PCS | Mod: CPTII,S$GLB,, | Performed by: FAMILY MEDICINE

## 2022-11-07 PROCEDURE — 99214 OFFICE O/P EST MOD 30 MIN: CPT | Mod: S$GLB,,, | Performed by: FAMILY MEDICINE

## 2022-11-07 PROCEDURE — 99999 PR PBB SHADOW E&M-EST. PATIENT-LVL III: ICD-10-PCS | Mod: PBBFAC,,, | Performed by: FAMILY MEDICINE

## 2022-11-07 PROCEDURE — 87804 POCT INFLUENZA A/B: ICD-10-PCS | Mod: 59,QW,S$GLB, | Performed by: FAMILY MEDICINE

## 2022-11-07 PROCEDURE — 3008F BODY MASS INDEX DOCD: CPT | Mod: CPTII,S$GLB,, | Performed by: FAMILY MEDICINE

## 2022-11-07 PROCEDURE — 3008F PR BODY MASS INDEX (BMI) DOCUMENTED: ICD-10-PCS | Mod: CPTII,S$GLB,, | Performed by: FAMILY MEDICINE

## 2022-11-07 RX ORDER — AMOXICILLIN AND CLAVULANATE POTASSIUM 875; 125 MG/1; MG/1
1 TABLET, FILM COATED ORAL EVERY 12 HOURS
Qty: 10 TABLET | Refills: 0 | Status: SHIPPED | OUTPATIENT
Start: 2022-11-07 | End: 2022-11-12

## 2022-11-07 NOTE — PROGRESS NOTES
(Portions of this note were dictated using voice recognition software and may contain dictation related errors in spelling/grammar/syntax not found on text review)    CC:   Chief Complaint   Patient presents with    Sinus Problem    Cough       HPI: 62 y.o. female, pt of Dr Lim, presented with sinus problem and cough as a new patient to me.  She has medical history significant for hyperlipidemia, otitis media, sciatica.  She reports symptoms started almost 2 weeks ago, she reports having sinus pressure and congestion with postnasal drip, she has throat scratchiness and dry hacking cough, patient reports having bilateral ear blockage.  She has tried Sudafed with mild to moderate relief, however, patient is concerned that symptoms are prolonged and not completely relieved.  She denies having any fever, chills, body aches.  She took 4 home COVID test since the onset of symptoms, most recent COVID test was yesterday, all of them were negative.  She is vaccinated.  She did not take flu test.  She has no other concerns.    Past Medical History:   Diagnosis Date    Ganglion cyst     History of shingles     Hyperlipidemia     Kidney stone     Otitis media     PONV (postoperative nausea and vomiting)     Sciatica     Urinary tract infection        Past Surgical History:   Procedure Laterality Date    COLONOSCOPY N/A 4/11/2018    Procedure: COLONOSCOPY Golytely;  Surgeon: Deya Harrington MD;  Location: [facility];  Service: Endoscopy;  Laterality: N/A;    CYSTOSCOPY N/A 7/6/2020    Procedure: CYSTOSCOPY;  Surgeon: Corey Riley MD;  Location: [facility];  Service: OB/GYN;  Laterality: N/A;    GANGLION CYST EXCISION      HYSTERECTOMY      INSERTION OF MIDURETHRAL SLING N/A 7/6/2020    Procedure: SLING, MIDURETHRAL;  Surgeon: Corey Riley MD;  Location: [facility];  Service: OB/GYN;  Laterality: N/A;    LAPAROSCOPIC SALPINGO-OOPHORECTOMY N/A 7/6/2020    Procedure: SALPINGO-OOPHORECTOMY, LAPAROSCOPIC;  Surgeon: Corey MAYER  MD Rosemary;  Location: [facility];  Service: OB/GYN;  Laterality: N/A;    OOPHORECTOMY      ROBOT-ASSISTED LAPAROSCOPIC ABDOMINAL SACROCOLPOPEXY N/A 2020    Procedure: ROBOTIC SACROCOLPOPEXY, ABDOMEN;  Surgeon: Corey Riley MD;  Location: [facility] OR;  Service: OB/GYN;  Laterality: N/A;    ROBOT-ASSISTED LAPAROSCOPIC HYSTERECTOMY N/A 2020    Procedure: ROBOTIC HYSTERECTOMY;  Surgeon: Corey Riley MD;  Location: [facility] OR;  Service: OB/GYN;  Laterality: N/A;    TUBAL LIGATION  1998    urethra widened         Family History   Problem Relation Age of Onset    Cancer Maternal Grandmother         breast    Breast cancer Maternal Grandmother     Dementia Mother     Depression Father     Kidney disease Neg Hx     Thyroid disease Neg Hx        Social History     Tobacco Use    Smoking status: Former     Packs/day: 2.00     Types: Cigarettes     Quit date: 10/24/2008     Years since quittin.0    Smokeless tobacco: Never   Substance Use Topics    Alcohol use: No    Drug use: No       Lab Results   Component Value Date    WBC 12.75 (H) 2020    HGB 14.4 2020    HCT 44.4 2020    MCV 90 2020     2020    CHOL 160 2021    TRIG 105 2021    HDL 49 2021    ALT 12 2021    AST 16 2021    BILITOT 0.5 2021    ALKPHOS 101 2021     2021    K 4.5 2021     2021    CREATININE 0.9 2021    ESTGFRAFRICA >60.0 2021    EGFRNONAA >60.0 2021    CALCIUM 10.3 2021    ALBUMIN 4.1 2021    BUN 11 2021    CO2 27 2021    TSH 1.470 2018    HGBA1C 5.4 2021    LDLCALC 90.0 2021     2021             Vital signs reviewed  PE:   APPEARANCE: Well nourished, well developed, in no acute distress.    HEAD: Normocephalic, atraumatic.  EYES: EOMI.  Conjunctivae noninjected.  EAR: mildly erythematous ear canal, TM normal  NOSE: Mucosa pink. Airway clear.  MOUTH & THROAT: No  tonsillar enlargement. No pharyngeal erythema or exudate.   NECK: Supple with no cervical lymphadenopathy.    CHEST:  Lungs clear to auscultation with no wheezes or crackles.  CARDIOVASCULAR: Normal S1, S2. No rubs, murmurs, or gallops.  ABDOMEN: Bowel sounds normal. Not distended. Soft. No tenderness or masses. No organomegaly.  EXTREMITIES: No edema, cyanosis, or clubbing.    Review of Systems   Constitutional:  Negative for chills, fatigue and fever.   HENT:  Positive for nasal congestion, postnasal drip, rhinorrhea, sinus pressure/congestion and sore throat. Negative for ear discharge and ear pain.    Respiratory:  Positive for cough. Negative for shortness of breath and wheezing.    Cardiovascular:  Negative for chest pain, palpitations and leg swelling.   Gastrointestinal: Negative.    Genitourinary: Negative.    Neurological: Negative.    Psychiatric/Behavioral: Negative.     All other systems reviewed and are negative.    IMPRESSION  1. Acute maxillary sinusitis, recurrence not specified    2. Sinus congestion    3. Cough, unspecified type            PLAN      1. Sinus congestion  - POCT Influenza A/B      2. Acute maxillary sinusitis, recurrence not specified    Home COVID test negative   Flu negative      Consider bacterial infection due to prolonged duration of symptoms, will prescribe antibiotics    - amoxicillin-clavulanate 875-125mg (AUGMENTIN) 875-125 mg per tablet; Take 1 tablet by mouth every 12 (twelve) hours. for 5 days  Dispense: 10 tablet; Refill: 0    Can take Zyrtec and Flonase nasal spray for symptomatic relief   Steam inhalation and sinus rinses recommended   Tylenol/ibuprofen as needed  Mouthwash gargles 2-3 times every day      3. Cough, unspecified type     - POCT Influenza A/B      Age/demographic appropriate health maintenance:    Health Maintenance Due   Topic Date Due    Shingles Vaccine (1 of 2) Never done    COVID-19 Vaccine (5 - Booster for Pfizer series) 06/16/2022    Influenza  Vaccine (1) 09/01/2022         Return Ed/UC precautions disc      Anton Cevallos   11/7/2022

## 2022-12-12 ENCOUNTER — PATIENT MESSAGE (OUTPATIENT)
Dept: UROGYNECOLOGY | Facility: CLINIC | Age: 62
End: 2022-12-12
Payer: COMMERCIAL

## 2022-12-12 DIAGNOSIS — N95.2 VAGINAL ATROPHY: ICD-10-CM

## 2022-12-28 ENCOUNTER — OFFICE VISIT (OUTPATIENT)
Dept: FAMILY MEDICINE | Facility: CLINIC | Age: 62
End: 2022-12-28
Payer: COMMERCIAL

## 2022-12-28 VITALS
DIASTOLIC BLOOD PRESSURE: 78 MMHG | TEMPERATURE: 98 F | BODY MASS INDEX: 30.71 KG/M2 | HEART RATE: 90 BPM | OXYGEN SATURATION: 98 % | WEIGHT: 184.31 LBS | HEIGHT: 65 IN | SYSTOLIC BLOOD PRESSURE: 116 MMHG

## 2022-12-28 DIAGNOSIS — J32.9 BACTERIAL SINUSITIS: Primary | ICD-10-CM

## 2022-12-28 DIAGNOSIS — B96.89 BACTERIAL SINUSITIS: Primary | ICD-10-CM

## 2022-12-28 PROCEDURE — 99999 PR PBB SHADOW E&M-EST. PATIENT-LVL III: ICD-10-PCS | Mod: PBBFAC,,, | Performed by: FAMILY MEDICINE

## 2022-12-28 PROCEDURE — 3074F SYST BP LT 130 MM HG: CPT | Mod: CPTII,S$GLB,, | Performed by: FAMILY MEDICINE

## 2022-12-28 PROCEDURE — 3078F PR MOST RECENT DIASTOLIC BLOOD PRESSURE < 80 MM HG: ICD-10-PCS | Mod: CPTII,S$GLB,, | Performed by: FAMILY MEDICINE

## 2022-12-28 PROCEDURE — 99999 PR PBB SHADOW E&M-EST. PATIENT-LVL III: CPT | Mod: PBBFAC,,, | Performed by: FAMILY MEDICINE

## 2022-12-28 PROCEDURE — 1159F MED LIST DOCD IN RCRD: CPT | Mod: CPTII,S$GLB,, | Performed by: FAMILY MEDICINE

## 2022-12-28 PROCEDURE — 3008F PR BODY MASS INDEX (BMI) DOCUMENTED: ICD-10-PCS | Mod: CPTII,S$GLB,, | Performed by: FAMILY MEDICINE

## 2022-12-28 PROCEDURE — 3074F PR MOST RECENT SYSTOLIC BLOOD PRESSURE < 130 MM HG: ICD-10-PCS | Mod: CPTII,S$GLB,, | Performed by: FAMILY MEDICINE

## 2022-12-28 PROCEDURE — 1159F PR MEDICATION LIST DOCUMENTED IN MEDICAL RECORD: ICD-10-PCS | Mod: CPTII,S$GLB,, | Performed by: FAMILY MEDICINE

## 2022-12-28 PROCEDURE — 3078F DIAST BP <80 MM HG: CPT | Mod: CPTII,S$GLB,, | Performed by: FAMILY MEDICINE

## 2022-12-28 PROCEDURE — 3008F BODY MASS INDEX DOCD: CPT | Mod: CPTII,S$GLB,, | Performed by: FAMILY MEDICINE

## 2022-12-28 RX ORDER — MOXIFLOXACIN HYDROCHLORIDE 400 MG/1
400 TABLET ORAL DAILY
Qty: 7 TABLET | Refills: 0 | Status: SHIPPED | OUTPATIENT
Start: 2022-12-28 | End: 2023-01-04

## 2022-12-28 RX ORDER — FLUTICASONE PROPIONATE 50 MCG
2 SPRAY, SUSPENSION (ML) NASAL DAILY
Qty: 16 G | Refills: 2 | Status: SHIPPED | OUTPATIENT
Start: 2022-12-28 | End: 2023-07-19

## 2022-12-28 NOTE — PROGRESS NOTES
(Portions of this note were dictated using voice recognition software and may contain dictation related errors in spelling/grammar/syntax not found on text review)    CC:   Chief Complaint   Patient presents with    Sinus Problem     Sinus problems, wet cough, headache; had improved since antibiotics last month, worsened over the last week; fever 1 day; several negative COVID tests       HPI: 62 y.o. female pt of Tony Lim III, MD     seen on 11/07/2022 for sinus pressure and congestion lasting 2 weeks with postnasal drip, cough, ear blockages.  had multiple negative COVID test.  In office flu test negative.  Given prolonged symptoms was treated for acute bacterial sinusitis with Augmentin.  States that symptoms had improved but not completely resolved and now have worsened over the last week.  Symptoms include facial and sinus pressure specifically maxillary and frontal, nasal congestion, productive cough, postnasal drip.  No body aches.  She had a slight temperature elevation of 99.8 last week for 1 day.  She took a COVID test this morning which was negative.  No history of significant allergic rhinitis    Past Medical History:   Diagnosis Date    Ganglion cyst     History of shingles     Hyperlipidemia     Kidney stone     Otitis media     PONV (postoperative nausea and vomiting)     Sciatica     Urinary tract infection        Past Surgical History:   Procedure Laterality Date    COLONOSCOPY N/A 4/11/2018    Procedure: COLONOSCOPY Golytely;  Surgeon: Deya Harrington MD;  Location: [facility];  Service: Endoscopy;  Laterality: N/A;    CYSTOSCOPY N/A 7/6/2020    Procedure: CYSTOSCOPY;  Surgeon: Corey Riley MD;  Location: [facility];  Service: OB/GYN;  Laterality: N/A;    GANGLION CYST EXCISION      HYSTERECTOMY      INSERTION OF MIDURETHRAL SLING N/A 7/6/2020    Procedure: SLING, MIDURETHRAL;  Surgeon: Corey Riley MD;  Location: [facility];  Service: OB/GYN;  Laterality: N/A;    LAPAROSCOPIC  SALPINGO-OOPHORECTOMY N/A 2020    Procedure: SALPINGO-OOPHORECTOMY, LAPAROSCOPIC;  Surgeon: Corey Riley MD;  Location: [facility];  Service: OB/GYN;  Laterality: N/A;    OOPHORECTOMY      ROBOT-ASSISTED LAPAROSCOPIC ABDOMINAL SACROCOLPOPEXY N/A 2020    Procedure: ROBOTIC SACROCOLPOPEXY, ABDOMEN;  Surgeon: Corey Riley MD;  Location: [facility] OR;  Service: OB/GYN;  Laterality: N/A;    ROBOT-ASSISTED LAPAROSCOPIC HYSTERECTOMY N/A 2020    Procedure: ROBOTIC HYSTERECTOMY;  Surgeon: Corey Riley MD;  Location: [facility] OR;  Service: OB/GYN;  Laterality: N/A;    TUBAL LIGATION  1998    urethra widened         Family History   Problem Relation Age of Onset    Cancer Maternal Grandmother         breast    Breast cancer Maternal Grandmother     Dementia Mother     Depression Father     Kidney disease Neg Hx     Thyroid disease Neg Hx        Social History     Tobacco Use    Smoking status: Former     Packs/day: 2.00     Types: Cigarettes     Quit date: 10/24/2008     Years since quittin.1    Smokeless tobacco: Never   Substance Use Topics    Alcohol use: No    Drug use: No       Lab Results   Component Value Date    WBC 12.75 (H) 2020    HGB 14.4 2020    HCT 44.4 2020    MCV 90 2020     2020    CHOL 160 2021    TRIG 105 2021    HDL 49 2021    ALT 12 2021    AST 16 2021    BILITOT 0.5 2021    ALKPHOS 101 2021     2021    K 4.5 2021     2021    CREATININE 0.9 2021    ESTGFRAFRICA >60.0 2021    EGFRNONAA >60.0 2021    CALCIUM 10.3 2021    ALBUMIN 4.1 2021    BUN 11 2021    CO2 27 2021    TSH 1.470 2018    HGBA1C 5.4 2021    LDLCALC 90.0 2021     2021                 Vital signs reviewed  Vitals:    22 0755   BP: 116/78   BP Location: Right arm   Patient Position: Sitting   BP Method: Medium (Manual)   Pulse: 90   Temp: 98.1  "°F (36.7 °C)   TempSrc: Oral   SpO2: 98%   Weight: 83.6 kg (184 lb 4.9 oz)   Height: 5' 5" (1.651 m)       Wt Readings from Last 4 Encounters:   22 83.6 kg (184 lb 4.9 oz)   22 83.6 kg (184 lb 4.9 oz)   10/17/22 84.7 kg (186 lb 11.7 oz)   22 83.1 kg (183 lb 3.2 oz)       PE:   APPEARANCE: Well nourished, well developed, in no acute distress.    HEAD: Normocephalic, atraumatic.  Mild maxillary sinus discomfort to palpation  EYES: PERRL. EOMI.   Conjunctivae noninjected.  EARS:  TMs dull bilaterally  NOSE:  Left nasal septal deviation  MOUTH & THROAT: No tonsillar enlargement. No pharyngeal erythema or exudate.   NECK: Supple with no cervical lymphadenopathy.    CHEST: Good inspiratory effort. Lungs clear to auscultation with no wheezes or crackles.  CARDIOVASCULAR: Normal S1, S2. No rubs, murmurs, or gallops.  ABDOMEN: Bowel sounds normal. Not distended. Soft. No tenderness or masses. No organomegaly.         IMPRESSION  1. Bacterial sinusitis            PLAN       Medications Ordered This Encounter   Medications    fluticasone propionate (FLONASE) 50 mcg/actuation nasal spray     Si sprays (100 mcg total) by Each Nostril route once daily.     Dispense:  16 g     Refill:  2    moxifloxacin (AVELOX) 400 mg tablet     Sig: Take 1 tablet (400 mg total) by mouth once daily. for 7 days     Dispense:  7 tablet     Refill:  0      Persistent symptoms over the last couple of months, incomplete improvement with Augmentin prior with recurrence over the last week.  Concern for persistent bacterial rhinosinusitis.  Add Flonase 2 sprays each nostril once daily for the next week or 2.  She is currently taking Sudafed daily for the last month or so, can continue for the time being.  Can add Mucinex DM p.r.n. cough.  Can do Neti pot rinses as well prior to Flonase instillation.  If persistent symptoms even despite the above, may need further ENT consultation               "

## 2022-12-29 ENCOUNTER — PATIENT MESSAGE (OUTPATIENT)
Dept: FAMILY MEDICINE | Facility: CLINIC | Age: 62
End: 2022-12-29
Payer: COMMERCIAL

## 2022-12-29 RX ORDER — BENZONATATE 200 MG/1
200 CAPSULE ORAL 3 TIMES DAILY PRN
Qty: 30 CAPSULE | Refills: 0 | Status: SHIPPED | OUTPATIENT
Start: 2022-12-29 | End: 2023-01-08

## 2023-02-20 ENCOUNTER — HOSPITAL ENCOUNTER (OUTPATIENT)
Dept: RADIOLOGY | Facility: HOSPITAL | Age: 63
Discharge: HOME | End: 2023-02-20
Attending: INTERNAL MEDICINE
Payer: COMMERCIAL

## 2023-02-20 DIAGNOSIS — Z12.31 BREAST CANCER SCREENING BY MAMMOGRAM: ICD-10-CM

## 2023-02-20 PROCEDURE — 77067 MAMMO DIGITAL SCREENING BILAT WITH TOMO: ICD-10-PCS | Mod: 26,,, | Performed by: RADIOLOGY

## 2023-02-20 PROCEDURE — 77063 BREAST TOMOSYNTHESIS BI: CPT | Mod: 26,,, | Performed by: RADIOLOGY

## 2023-02-20 PROCEDURE — 77067 SCR MAMMO BI INCL CAD: CPT | Mod: 26,,, | Performed by: RADIOLOGY

## 2023-02-20 PROCEDURE — 77063 MAMMO DIGITAL SCREENING BILAT WITH TOMO: ICD-10-PCS | Mod: 26,,, | Performed by: RADIOLOGY

## 2023-02-20 PROCEDURE — 77067 SCR MAMMO BI INCL CAD: CPT | Mod: TC

## 2023-06-15 ENCOUNTER — PATIENT MESSAGE (OUTPATIENT)
Dept: UROGYNECOLOGY | Facility: CLINIC | Age: 63
End: 2023-06-15
Payer: COMMERCIAL

## 2023-06-20 ENCOUNTER — PATIENT MESSAGE (OUTPATIENT)
Dept: UROGYNECOLOGY | Facility: CLINIC | Age: 63
End: 2023-06-20
Payer: COMMERCIAL

## 2023-07-19 ENCOUNTER — OFFICE VISIT (OUTPATIENT)
Dept: INTERNAL MEDICINE | Facility: CLINIC | Age: 63
End: 2023-07-19
Payer: COMMERCIAL

## 2023-07-19 ENCOUNTER — PATIENT MESSAGE (OUTPATIENT)
Dept: UROGYNECOLOGY | Facility: CLINIC | Age: 63
End: 2023-07-19
Payer: COMMERCIAL

## 2023-07-19 ENCOUNTER — LAB VISIT (OUTPATIENT)
Dept: LAB | Facility: HOSPITAL | Age: 63
End: 2023-07-19
Attending: INTERNAL MEDICINE
Payer: COMMERCIAL

## 2023-07-19 VITALS
DIASTOLIC BLOOD PRESSURE: 82 MMHG | OXYGEN SATURATION: 98 % | SYSTOLIC BLOOD PRESSURE: 118 MMHG | BODY MASS INDEX: 30.49 KG/M2 | HEART RATE: 80 BPM | WEIGHT: 183 LBS | HEIGHT: 65 IN

## 2023-07-19 DIAGNOSIS — R10.84 GENERALIZED ABDOMINAL PAIN: Primary | ICD-10-CM

## 2023-07-19 DIAGNOSIS — R10.84 GENERALIZED ABDOMINAL PAIN: ICD-10-CM

## 2023-07-19 LAB
ALBUMIN SERPL BCP-MCNC: 4 G/DL (ref 3.5–5.2)
ALP SERPL-CCNC: 94 U/L (ref 55–135)
ALT SERPL W/O P-5'-P-CCNC: 15 U/L (ref 10–44)
ANION GAP SERPL CALC-SCNC: 11 MMOL/L (ref 8–16)
AST SERPL-CCNC: 17 U/L (ref 10–40)
BILIRUB SERPL-MCNC: 0.4 MG/DL (ref 0.1–1)
BUN SERPL-MCNC: 17 MG/DL (ref 8–23)
CALCIUM SERPL-MCNC: 9.8 MG/DL (ref 8.7–10.5)
CHLORIDE SERPL-SCNC: 107 MMOL/L (ref 95–110)
CO2 SERPL-SCNC: 23 MMOL/L (ref 23–29)
CREAT SERPL-MCNC: 0.8 MG/DL (ref 0.5–1.4)
CRP SERPL-MCNC: 4.8 MG/L (ref 0–8.2)
ERYTHROCYTE [DISTWIDTH] IN BLOOD BY AUTOMATED COUNT: 13.1 % (ref 11.5–14.5)
EST. GFR  (NO RACE VARIABLE): >60 ML/MIN/1.73 M^2
GLUCOSE SERPL-MCNC: 78 MG/DL (ref 70–110)
HCT VFR BLD AUTO: 46.1 % (ref 37–48.5)
HGB BLD-MCNC: 15 G/DL (ref 12–16)
LIPASE SERPL-CCNC: 25 U/L (ref 4–60)
MCH RBC QN AUTO: 29.7 PG (ref 27–31)
MCHC RBC AUTO-ENTMCNC: 32.5 G/DL (ref 32–36)
MCV RBC AUTO: 91 FL (ref 82–98)
PLATELET # BLD AUTO: 252 K/UL (ref 150–450)
PMV BLD AUTO: 12.1 FL (ref 9.2–12.9)
POTASSIUM SERPL-SCNC: 4.6 MMOL/L (ref 3.5–5.1)
PROT SERPL-MCNC: 7.3 G/DL (ref 6–8.4)
RBC # BLD AUTO: 5.05 M/UL (ref 4–5.4)
SODIUM SERPL-SCNC: 141 MMOL/L (ref 136–145)
WBC # BLD AUTO: 7.48 K/UL (ref 3.9–12.7)

## 2023-07-19 PROCEDURE — 83690 ASSAY OF LIPASE: CPT | Performed by: INTERNAL MEDICINE

## 2023-07-19 PROCEDURE — 36415 COLL VENOUS BLD VENIPUNCTURE: CPT | Mod: PO | Performed by: INTERNAL MEDICINE

## 2023-07-19 PROCEDURE — 3079F PR MOST RECENT DIASTOLIC BLOOD PRESSURE 80-89 MM HG: ICD-10-PCS | Mod: CPTII,S$GLB,, | Performed by: INTERNAL MEDICINE

## 2023-07-19 PROCEDURE — 99214 PR OFFICE/OUTPT VISIT, EST, LEVL IV, 30-39 MIN: ICD-10-PCS | Mod: S$GLB,,, | Performed by: INTERNAL MEDICINE

## 2023-07-19 PROCEDURE — 3008F BODY MASS INDEX DOCD: CPT | Mod: CPTII,S$GLB,, | Performed by: INTERNAL MEDICINE

## 2023-07-19 PROCEDURE — 99214 OFFICE O/P EST MOD 30 MIN: CPT | Mod: S$GLB,,, | Performed by: INTERNAL MEDICINE

## 2023-07-19 PROCEDURE — 1160F PR REVIEW ALL MEDS BY PRESCRIBER/CLIN PHARMACIST DOCUMENTED: ICD-10-PCS | Mod: CPTII,S$GLB,, | Performed by: INTERNAL MEDICINE

## 2023-07-19 PROCEDURE — 3074F PR MOST RECENT SYSTOLIC BLOOD PRESSURE < 130 MM HG: ICD-10-PCS | Mod: CPTII,S$GLB,, | Performed by: INTERNAL MEDICINE

## 2023-07-19 PROCEDURE — 1159F PR MEDICATION LIST DOCUMENTED IN MEDICAL RECORD: ICD-10-PCS | Mod: CPTII,S$GLB,, | Performed by: INTERNAL MEDICINE

## 2023-07-19 PROCEDURE — 1160F RVW MEDS BY RX/DR IN RCRD: CPT | Mod: CPTII,S$GLB,, | Performed by: INTERNAL MEDICINE

## 2023-07-19 PROCEDURE — 3079F DIAST BP 80-89 MM HG: CPT | Mod: CPTII,S$GLB,, | Performed by: INTERNAL MEDICINE

## 2023-07-19 PROCEDURE — 99999 PR PBB SHADOW E&M-EST. PATIENT-LVL III: ICD-10-PCS | Mod: PBBFAC,,, | Performed by: INTERNAL MEDICINE

## 2023-07-19 PROCEDURE — 3074F SYST BP LT 130 MM HG: CPT | Mod: CPTII,S$GLB,, | Performed by: INTERNAL MEDICINE

## 2023-07-19 PROCEDURE — 1159F MED LIST DOCD IN RCRD: CPT | Mod: CPTII,S$GLB,, | Performed by: INTERNAL MEDICINE

## 2023-07-19 PROCEDURE — 85027 COMPLETE CBC AUTOMATED: CPT | Performed by: INTERNAL MEDICINE

## 2023-07-19 PROCEDURE — 99999 PR PBB SHADOW E&M-EST. PATIENT-LVL III: CPT | Mod: PBBFAC,,, | Performed by: INTERNAL MEDICINE

## 2023-07-19 PROCEDURE — 3008F PR BODY MASS INDEX (BMI) DOCUMENTED: ICD-10-PCS | Mod: CPTII,S$GLB,, | Performed by: INTERNAL MEDICINE

## 2023-07-19 PROCEDURE — 80053 COMPREHEN METABOLIC PANEL: CPT | Performed by: INTERNAL MEDICINE

## 2023-07-19 PROCEDURE — 86140 C-REACTIVE PROTEIN: CPT | Performed by: INTERNAL MEDICINE

## 2023-07-19 RX ORDER — PANTOPRAZOLE SODIUM 40 MG/1
40 TABLET, DELAYED RELEASE ORAL DAILY
Qty: 30 TABLET | Refills: 2 | Status: CANCELLED | OUTPATIENT
Start: 2023-07-19

## 2023-07-19 NOTE — PROGRESS NOTES
"Assessment:       1. Generalized abdominal pain          Plan:         Mari was seen today for abdominal pain.    Diagnoses and all orders for this visit:    Generalized abdominal pain    New   Uncontrolled  Signs, symptoms consistent with unclear etiology  history or pyhsical exam do not suggest acute abdomen   DDx includes but not limited to Diverticulitis, gerd, uti,   I provided instruction on supportive care measures   Prior tesing reviewed and new testing was was given   reviewed signs and symptoms that should prompt return to provider or evaluation in the ED  -     CBC Without Differential; Future  -     Comprehensive Metabolic Panel; Future  -     Lipase; Future  -     Urine culture; Future  -     Urinalysis; Future  -     C-REACTIVE PROTEIN; Future    Other orders  The following orders have not been finalized:  -     Cancel: pantoprazole (PROTONIX) 40 MG tablet          Subjective:       Patient ID: Mari Ambrocio is a 63 y.o. female.    Chief Complaint: Abdominal Pain (Pain been going on for about ten days. No fever. No vomiting.)        Interim Hx  Last seen by me in 2022    Concerns today  Abdominal Pain   Onset: 10 days  Frequency: contstant   Duration: constant    Location: generalized   Dull  Scale4  Assoicated symptoms :  lower abdominal cramps, nausea  Denies: melena, hematochezia, fever, abnormal vaginal bleeding, urinary symptoms, vaginal discharge  Improved with:   none  Worsened by: none  Previous episodes   No recent antibitoics  Due for CRC screening         Chronic problems     HPI    Review of Systems   All other systems reviewed and are negative.          Health Maintenance Due   Topic Date Due    Shingles Vaccine (1 of 2) Never done    COVID-19 Vaccine (5 - Pfizer series) 06/16/2022    Colorectal Cancer Screening  04/11/2023         Objective:     /82 (BP Location: Right arm, Patient Position: Sitting, BP Method: Large (Manual))   Pulse 80   Ht 5' 5" (1.651 m)   Wt 83 kg (182 lb " 15.7 oz)   LMP 06/01/2000   SpO2 98%   BMI 30.45 kg/m²     Vitals 7/19/2023 12/28/2022 11/7/2022 10/17/2022 8/17/2022   Height 65 65 65 65 65   Weight (lbs) 182.98 184.3 184.3 186.73 183.2   BMI (kg/m2) 30.4 30.7 30.7 31.1 30.5                Physical Exam  Vitals and nursing note reviewed.   Constitutional:       General: She is not in acute distress.     Appearance: She is well-developed. She is not diaphoretic.   HENT:      Head: Normocephalic.      Nose: Nose normal.   Eyes:      General:         Right eye: No discharge.         Left eye: No discharge.      Conjunctiva/sclera: Conjunctivae normal.      Pupils: Pupils are equal, round, and reactive to light.   Cardiovascular:      Rate and Rhythm: Normal rate and regular rhythm.      Heart sounds: Normal heart sounds. No murmur heard.    No friction rub. No gallop.   Pulmonary:      Effort: Pulmonary effort is normal. No respiratory distress.   Abdominal:      General: Bowel sounds are normal. There is no distension.      Palpations: Abdomen is soft.      Comments: General tenderness,    Musculoskeletal:         General: No deformity. Normal range of motion.      Cervical back: Normal range of motion.   Skin:     General: Skin is warm.   Neurological:      Mental Status: She is alert and oriented to person, place, and time.      Cranial Nerves: No cranial nerve deficit.           Future Appointments   Date Time Provider Department Center   8/24/2023  2:00 PM Mag Louis NP [facility]   10/17/2023  8:20 AM Ger Lim III, MD [facility]         Medication List with Changes/Refills   Current Medications    IBUPROFEN (ADVIL,MOTRIN) 600 MG TABLET    TAKE 1 TABLET BY MOUTH THREE TIMES A DAY   Discontinued Medications    CONJUGATED ESTROGENS (PREMARIN) VAGINAL CREAM    Place 0.5 g vaginally twice a week.    FLUTICASONE PROPIONATE (FLONASE) 50 MCG/ACTUATION NASAL SPRAY    2 sprays (100 mcg total) by Each Nostril route once daily.     "PEN NEEDLE, DIABETIC (BD ULTRA-FINE CAROLYNN PEN NEEDLE) 32 GAUGE X 5/32" NDLE    1 each by Misc.(Non-Drug; Combo Route) route once daily.    SEMAGLUTIDE (OZEMPIC) 0.25 MG OR 0.5 MG(2 MG/1.5 ML) PEN INJECTOR    Inject 0.25 mg into the skin every 7 days.         Disclaimer:  This note has been generated using voice-recognition software. There may be grammatical or spelling errors that have been missed during proof-reading     "

## 2023-07-21 ENCOUNTER — PATIENT MESSAGE (OUTPATIENT)
Dept: INTERNAL MEDICINE | Facility: CLINIC | Age: 63
End: 2023-07-21
Payer: COMMERCIAL

## 2023-07-21 DIAGNOSIS — R11.2 NAUSEA AND VOMITING, UNSPECIFIED VOMITING TYPE: Primary | ICD-10-CM

## 2023-07-21 NOTE — TELEPHONE ENCOUNTER
Future Appointments   Date Time Provider Department Center   8/24/2023  2:00 PM Mag Louis NP [facility] UROGYN [facility]   10/17/2023  8:20 AM Ger Lim III, MD [facility] JULIETA Aquino

## 2023-07-24 ENCOUNTER — TELEPHONE (OUTPATIENT)
Dept: ADMINISTRATIVE | Facility: OTHER | Age: 63
End: 2023-07-24
Payer: COMMERCIAL

## 2023-08-01 ENCOUNTER — PATIENT MESSAGE (OUTPATIENT)
Dept: UROGYNECOLOGY | Facility: CLINIC | Age: 63
End: 2023-08-01
Payer: COMMERCIAL

## 2023-08-02 ENCOUNTER — PATIENT MESSAGE (OUTPATIENT)
Dept: UROGYNECOLOGY | Facility: CLINIC | Age: 63
End: 2023-08-02
Payer: COMMERCIAL

## 2023-08-24 ENCOUNTER — OFFICE VISIT (OUTPATIENT)
Dept: UROGYNECOLOGY | Facility: CLINIC | Age: 63
End: 2023-08-24
Payer: COMMERCIAL

## 2023-08-24 VITALS
HEIGHT: 65 IN | WEIGHT: 183.19 LBS | DIASTOLIC BLOOD PRESSURE: 80 MMHG | BODY MASS INDEX: 30.52 KG/M2 | SYSTOLIC BLOOD PRESSURE: 122 MMHG

## 2023-08-24 DIAGNOSIS — R23.8 VESICULAR RASH: ICD-10-CM

## 2023-08-24 DIAGNOSIS — Z01.419 WELL WOMAN EXAM: Primary | ICD-10-CM

## 2023-08-24 DIAGNOSIS — N95.2 VAGINAL ATROPHY: ICD-10-CM

## 2023-08-24 PROCEDURE — 99396 PR PREVENTIVE VISIT,EST,40-64: ICD-10-PCS | Mod: S$GLB,,, | Performed by: NURSE PRACTITIONER

## 2023-08-24 PROCEDURE — 99999 PR PBB SHADOW E&M-EST. PATIENT-LVL III: ICD-10-PCS | Mod: PBBFAC,,, | Performed by: NURSE PRACTITIONER

## 2023-08-24 PROCEDURE — 1159F MED LIST DOCD IN RCRD: CPT | Mod: CPTII,S$GLB,, | Performed by: NURSE PRACTITIONER

## 2023-08-24 PROCEDURE — 99999 PR PBB SHADOW E&M-EST. PATIENT-LVL III: CPT | Mod: PBBFAC,,, | Performed by: NURSE PRACTITIONER

## 2023-08-24 PROCEDURE — 3008F PR BODY MASS INDEX (BMI) DOCUMENTED: ICD-10-PCS | Mod: CPTII,S$GLB,, | Performed by: NURSE PRACTITIONER

## 2023-08-24 PROCEDURE — 3008F BODY MASS INDEX DOCD: CPT | Mod: CPTII,S$GLB,, | Performed by: NURSE PRACTITIONER

## 2023-08-24 PROCEDURE — 3079F DIAST BP 80-89 MM HG: CPT | Mod: CPTII,S$GLB,, | Performed by: NURSE PRACTITIONER

## 2023-08-24 PROCEDURE — 3074F SYST BP LT 130 MM HG: CPT | Mod: CPTII,S$GLB,, | Performed by: NURSE PRACTITIONER

## 2023-08-24 PROCEDURE — 1159F PR MEDICATION LIST DOCUMENTED IN MEDICAL RECORD: ICD-10-PCS | Mod: CPTII,S$GLB,, | Performed by: NURSE PRACTITIONER

## 2023-08-24 PROCEDURE — 1160F RVW MEDS BY RX/DR IN RCRD: CPT | Mod: CPTII,S$GLB,, | Performed by: NURSE PRACTITIONER

## 2023-08-24 PROCEDURE — 3079F PR MOST RECENT DIASTOLIC BLOOD PRESSURE 80-89 MM HG: ICD-10-PCS | Mod: CPTII,S$GLB,, | Performed by: NURSE PRACTITIONER

## 2023-08-24 PROCEDURE — 3074F PR MOST RECENT SYSTOLIC BLOOD PRESSURE < 130 MM HG: ICD-10-PCS | Mod: CPTII,S$GLB,, | Performed by: NURSE PRACTITIONER

## 2023-08-24 PROCEDURE — 1160F PR REVIEW ALL MEDS BY PRESCRIBER/CLIN PHARMACIST DOCUMENTED: ICD-10-PCS | Mod: CPTII,S$GLB,, | Performed by: NURSE PRACTITIONER

## 2023-08-24 PROCEDURE — 99396 PREV VISIT EST AGE 40-64: CPT | Mod: S$GLB,,, | Performed by: NURSE PRACTITIONER

## 2023-08-24 RX ORDER — VALACYCLOVIR HYDROCHLORIDE 1 G/1
1000 TABLET, FILM COATED ORAL EVERY 12 HOURS
Qty: 20 TABLET | Refills: 0 | Status: SHIPPED | OUTPATIENT
Start: 2023-08-24 | End: 2023-09-03

## 2023-08-24 NOTE — PROGRESS NOTES
08/24/2023    SUBJECTIVE:   63 y.o. female for annual exam.    OPERATIVE NOTE  07/06/2020  Title of Operation:  1)  Robotic-assisted total laparoscopic hysterectomy with bilateral salpingo-oophorectomy  2)  Robotic-assisted sacral colpopexy with polypropylene mesh  3)  Placement of retropubic tension-free midurethral sling, Advantage Fit ([employer])  4)  Cystourethroscopy     Indications for Surgery:     Ohs Peq [id number]      Question 3/21/2020 10:43 AM CDT - Filed by Patient on 3/21/2020   Do you...     Usually experience pressure in the lower abdomen? Symptoms not present   Usually experience heaviness or dullness in the pelvic area? Symptoms not present   Usually have a bulge or something falling out that you can see or feel in your vaginal area? Symptoms present and they bother me somewhat; present slightly past introitus; mostly noted when wiping; no major bother symptoms--has been doing Kegels a lot, which is helping   Ever have to push on the vagina or around the rectum to complete a bowel movement? Symptoms not present   Usually experience a feeling of incomplete bladder emptying? Symptoms not present   Ever have to push up on a bulge in the vaginal area with your fingers to start or complete urination? Symptoms not present   Do you...     Feel you need to strain too hard to have a bowel movement? Symptoms not present   Feel you have not completely emptied your bowels at the end of a bowel movement?  Symptoms not present   Usually lose stool beyond your control if your stool is well formed? Symptoms not present   Usually lose stool beyond your control if your stool is loose? Symptoms not present   Usually lose gas from your rectum beyond your control? Symptoms not present   Usually have pain when you pass your stool? Symptoms not present   Experience a strong sense of urgency and have to rush to the bathroom to have a bowel movement? Symptoms not present   Does part of your bowel ever pass through the  rectum and bulge outside during or after a bowel movement? Symptoms not present   Do you...      Usually experience frequent urination? Symptoms not present   Usually experience urine leakage associated with a feeling of urgency, that is, a strong sensation of needing to go to the bathroom? Symptoms not present   Usually experience urine leakage related to coughing, sneezing or laughing? Symptoms not present   Usually experience small amounts of urine leakage (that is, drops)? Symptoms present but they do not bother me at all   Usually experience difficulty emptying your bladder? Symptoms present but they do not bother me at all; PV to help   Usually experience pain or discomfort in the lower abdomen or genital region? Symptoms not present   POPDI  (range: 0 - 100) 8.33   CRADI (range: 0 - 100) 0   KEYANA (range: 0 - 100) 8.33   TOTAL SCORE  (range: 0 - 300) 16.66   Ohs Peq Urogyn Hpi      Question 3/21/2020 10:56 AM CDT - Filed by Patient on 3/21/2020   General Urogynecology: Are you experiencing the following?     Dysuria (painful urination) No   Nocturia:  waking up at night to empty your bladder  Yes   If you answered yes to the previous question, how many times does this happen per night? 1   Enuresis (urine loss during sleep) No   Dribbling urine after you urinate No   Hematuria (urine appears red) Yes   Type of stream Strong   Urinary frequency: How often a day are you going to the bathroom per day?  Less than 10; Q4h   Urinary Tract Infections: How many Urinary Tract Infections have you had in the past year? I have not had a UTI in the past year   If you have had a UTI in the past year, what treatments have you had so far?  I have not had a UTI in the past year   Urinary Incontinence (General): Are you experiencing the following?     Past consultation for incontinence: Have you ever seen someone for the evaluation of incontinence? No   If you answered yes to the previous question, please select all the  "therapies you have tried.  N/a- I answered no to the previous question   Please note the effectiveness of the therapies.     Need to wear protection to keep clothes dry  No   If you answered yes to the previous question, please mathew the protection you use.  None   If you wear protection, how much wetness is typically on each pad? N/a- I do not wear protection   If you wear protection, how often do you have to change per day, if applicable?  0   Stress Symptoms: Are you experiencing the following?     Leakage of urine with cough, laugh and/or sneeze No   If you answered yes to the previous question, what is the frequency in days, weeks and/or months? Never   Leakage of urine with sex No   Leakage of urine with bending/ lifting No   Leakage of urine with briskly walking or jogging No   If you lose urine for any other reason not previously mentioned, please note it below, if applicable.      Urge Symptoms: Are you experiencing the following?     Urgency ("got to go" feeling) No   Urge: How frequently do you feel an urge to urinate (feeling like you "gotta go" to the bathroom and can't wait) Never   Do you experience a leakage of urine when you have a feeling of urgency?  No   Leakage of urine when unaware No   Past use of anticholinergics (medications used to treat overactive bladder) No   If you answered yes to the previous question, please mathew the anticholinergics you have used:      Have you ever used Mirbetriq (aka Mirabegron)?  No   Prolapse Symptoms: Are you experiencing any of the following?      Falling out/ Bulging/ Heaviness in the vagina Yes   Vaginal/ Abdominal Pain/ Pressure No   Need to strain/ Push to void No   Need to wait on the toilet before you void Yes   Unusual position to urinate (using your hands to push back the vaginal bulge) Yes   Sensation of incomplete emptying No   Past use of pessary device No   If you answered yes to the previous question, please list the devices you have used below.    "   Bowel Symptoms: Are you experiencing any of the following?     Constipation No   Diarrhea  No   Hematochezia (bloody stool) No   Incomplete evacuation of stool No   Involuntary loss of formed stool No   Fecal smearing/urgency No   Involuntary loss of gas No   Vaginal Symptoms: Are you experiencing any of the following?      Abnormal vaginal bleeding  Yes: see below   Vaginal dryness No   Sexually active  Yes   Dyspareunia (painful intercourse) No   Estrogen use  No   Ohs Peq Pelvic Pain Urogyn      Question 3/21/2020 10:56 AM CDT - Filed by Patient on 3/21/2020   Are you experiencing pelvic pain?  No      2)   bleeding/spotting:  --x 1 in 12/2019; mild spotting; pre-coital spotting x 1  --told PCP  --pelvic US 2/21/2020:  Uterus:  Size: 6.8 x 2.9 x 5.1 cm  Masses: There is a small uterine fibroid, intramural, less than 1 cm.  Endometrium: Thickened in this post menopausal patient, measuring 6 mm.  There is a an oval isoechoic mass within the cervix measuring 0.6 x 0.6 x 0.7 cm with smooth contour.  The bilateral ovaries were not seen.  The bilateral adnexa appear normal.  Free Fluid:  Trace of free fluid with septation.     03/25/2020  embx  SMALL FRAGMENTS OF INACTIVE ENDOMETRIUM AND BLOOD CLOT  Cervical polyp  BENIGN INFLAMED ENDOCERVICAL POLYP     Suds  06/10/2020     Urine dipstick: neg.     1.  VOIDING PHASE:       a.  Uroflowmetry:  Prolapse reduction: No  Voided volume:  578 mL   Voiding time:   43 seconds  Max flow:  42 mL/s  Avg flow:   13 mL/s   PVR:   50 mL     The overall configuration of this uroflow study was normal.       b.  Pressure flow:  Prolapse reduction: No  Voided volume:   628 mL  Voiding time:  70 seconds  Peak flow:  49 mL/s   Avg flow:  11 mL/s  Max det pressure:  62  cm H20  Det pressure at max flow: 22 cm H20  Void initiated by detrusor contraction.    Urethral relaxation (EMG):  absent.    PVR (calculated):  0 mL     The overall configuration of this pressure flow study was  prolonged but normal.       2.  FILLING PHASE:  1st desire: 96 mL  Normal desire:  257 mL  Strong desire:  296 mL  Urgency:  413 mL  Compliance (calculated)  approx 150 mL/cm H20  EMG activity during filling:  stable  Detrusor contractions observed: No.       3.  URETHRAL FUNCTION/STORAGE PHASE:     a.  WITH prolapse reduction:  CLPP (150 mL): Negative  at  169 cm H20  VLPP (150 mL): Negative  at  111 cm H20  CLPP (300 mL): Negative  at  167 cm H20  VLPP (300 mL): Negative  at  89 cm H20   CLPP (MAX ):    Negative  at  147 cm H20  VLPP (MAX):     Negative  at  89 cm H20  POS CLPP at max cap once pves catheter removed.      These findings are consistent with Positive urodynamic stress incontinence with cough at max cap once pves catheter removed.     Assessment:  UF normal.  PF prolonged but normal.  Compliance normal.  Max capacity 628 mL.  DO (--).  PROMISE (+).        Cysto  Assessment: Essentially normal cystourethroscopy with mild decrease in urethral coaptation.       Preoperative Diagnosis:  1. Postmenopausal bleeding    2. Cystocele with prolapse    3. Abnormal pelvic ultrasound    4. Rectocele, female    5. Uterovaginal prolapse    6. Cervical polyp    7. Urge urinary incontinence    8.     Urodynamic stress incontinence  9.     Decreased urethral coaptation, mild     Postoperative Diagnosis:  1. Postmenopausal bleeding    2. Cystocele with prolapse    3. Abnormal pelvic ultrasound    4. Rectocele, female    5. Uterovaginal prolapse    6. Cervical polyp    7. Urge urinary incontinence    8.     Urodynamic stress incontinence  9.     Decreased urethral coaptation, mild          Past Medical History:   Diagnosis Date    Ganglion cyst     History of shingles     Hyperlipidemia     Kidney stone     Otitis media     PONV (postoperative nausea and vomiting)     Sciatica     Urinary tract infection        Past Surgical History:   Procedure Laterality Date    COLONOSCOPY N/A 4/11/2018    Procedure: COLONOSCOPY Golytely;   Surgeon: Deya Harrington MD;  Location: [facility];  Service: Endoscopy;  Laterality: N/A;    CYSTOSCOPY N/A 2020    Procedure: CYSTOSCOPY;  Surgeon: Corey Riley MD;  Location: [facility];  Service: OB/GYN;  Laterality: N/A;    GANGLION CYST EXCISION      HYSTERECTOMY      INSERTION OF MIDURETHRAL SLING N/A 2020    Procedure: SLING, MIDURETHRAL;  Surgeon: Corey Riley MD;  Location: [facility];  Service: OB/GYN;  Laterality: N/A;    LAPAROSCOPIC SALPINGO-OOPHORECTOMY N/A 2020    Procedure: SALPINGO-OOPHORECTOMY, LAPAROSCOPIC;  Surgeon: Corey Riley MD;  Location: [facility];  Service: OB/GYN;  Laterality: N/A;    OOPHORECTOMY      ROBOT-ASSISTED LAPAROSCOPIC ABDOMINAL SACROCOLPOPEXY N/A 2020    Procedure: ROBOTIC SACROCOLPOPEXY, ABDOMEN;  Surgeon: Corey Riley MD;  Location: [facility];  Service: OB/GYN;  Laterality: N/A;    ROBOT-ASSISTED LAPAROSCOPIC HYSTERECTOMY N/A 2020    Procedure: ROBOTIC HYSTERECTOMY;  Surgeon: Corey Riley MD;  Location: [facility];  Service: OB/GYN;  Laterality: N/A;    TUBAL LIGATION  1998    urethra widened         Family History   Problem Relation Age of Onset    Cancer Maternal Grandmother         breast    Breast cancer Maternal Grandmother     Dementia Mother     Depression Father     Kidney disease Neg Hx     Thyroid disease Neg Hx        Social History     Socioeconomic History    Marital status:    Tobacco Use    Smoking status: Former     Current packs/day: 0.00     Types: Cigarettes     Quit date: 10/24/2008     Years since quittin.8    Smokeless tobacco: Never   Substance and Sexual Activity    Alcohol use: No    Drug use: No    Sexual activity: Yes     Partners: Male     Social Determinants of Health     Financial Resource Strain: Medium Risk (2022)    Overall Financial Resource Strain (CARDIA)     Difficulty of Paying Living Expenses: Somewhat hard   Food Insecurity: No Food Insecurity (2022)    Hunger Vital Sign      Worried About Running Out of Food in the Last Year: Never true     Ran Out of Food in the Last Year: Never true   Transportation Needs: No Transportation Needs (11/7/2022)    PRAPARE - Transportation     Lack of Transportation (Medical): No     Lack of Transportation (Non-Medical): No   Physical Activity: Sufficiently Active (11/7/2022)    Exercise Vital Sign     Days of Exercise per Week: 5 days     Minutes of Exercise per Session: 40 min   Stress: No Stress Concern Present (11/7/2022)    [place] [employer] - Occupational Stress Questionnaire     Feeling of Stress : Only a little   Social Connections: Unknown (11/7/2022)    Social Connection and Isolation Panel [NHANES]     Frequency of Communication with Friends and Family: More than three times a week     Frequency of Social Gatherings with Friends and Family: Three times a week     Active Member of Clubs or Organizations: Yes     Attends Club or Organization Meetings: More than 4 times per year     Marital Status:    Housing Stability: Low Risk  (11/7/2022)    Housing Stability Vital Sign     Unable to Pay for Housing in the Last Year: No     Number of Places Lived in the Last Year: 1     Unstable Housing in the Last Year: No       Current Outpatient Medications   Medication Sig Dispense Refill    estrogens, conjugated (PREMARIN VAGL) Place vaginally.      ibuprofen (ADVIL,MOTRIN) 600 MG tablet TAKE 1 TABLET BY MOUTH THREE TIMES A DAY 30 tablet 6    valACYclovir (VALTREX) 1000 MG tablet Take 1 tablet (1,000 mg total) by mouth every 12 (twelve) hours. for 10 days 20 tablet 0     No current facility-administered medications for this visit.       Review of patient's allergies indicates:   Allergen Reactions    Bananas [banana]     Cantaloupe     Cucumbers [cucumber fruit extract]     Imogene     Watermelon        Patient's last menstrual period was 06/01/2000.    Well Woman:  Pap test: post hysterectomy 2017, normal/HPV neg.  History of  "abnormal paps: No.  History of STIs:  No  Mammogram: Date of last: 2022  Result: Normal  Colonoscopy: Date of last: .  Result:  Polyps, benign.  Repeat due:  .    DEXA:  Date of last: .  Result:  normal.  Repeat due:  63 yo.        OB History          6    Para   3    Term   3            AB        Living   3         SAB        IAB        Ectopic        Multiple        Live Births                       ROS:  Feeling well.   No dyspnea or chest pain on exertion.    + sharp pain in umbilical area--with + bloating x 6 weeks-- seems to have resolved since stopping ibuprofen    No urinary tract symptoms. + suprapubic pressure in early aM  GYN ROS: no breast pain or new or enlarging lumps on self exam, no vaginal bleeding. No neurological complaints.    OBJECTIVE:   The patient appears well, alert, oriented x 3, in no distress.  /80 (BP Location: Left arm, Patient Position: Sitting, BP Method: Large (Manual))   Ht 5' 5" (1.651 m)   Wt 83.1 kg (183 lb 3.2 oz)   LMP 2000   BMI 30.49 kg/m²   ENT normal.  Neck supple. No adenopathy or thyromegaly. JAYDON.   Normal respiratory effort  Pulse with  regular rate and rhythm.   Abdomen soft without tenderness, guarding, mass or organomegaly. Vesicular healing rash noted on LLQ  Extremities show no edema, normal peripheral pulses.   Neurological is normal, no focal findings.    BREAST EXAM: breasts appear normal, no suspicious masses, no skin or nipple changes or axillary nodes    PELVIC EXAM:   VULVA: normal appearing vulva with no masses, tenderness or lesions,   VAGINA: normal appearing vagina with normal color and discharge, no lesions, atrophic,  no mesh visible/ palpable. Sling path non tender  CERVIX: absebt,   UTERUS: absent,   ADNEXA: no masses,   RECTAL: normal rectal, no masses    ASSESSMENT:   1. Well woman exam        2. Vesicular rash  valACYclovir (VALTREX) 1000 MG tablet      3. Vaginal atrophy              PLAN:   1. Well " woman  -- up to date  --no lifting > 50 pounds without assistance    2. Vaginal atrophy (dryness):  Use 0.5 gram of estrogen cream in vagina twice a week     3. Rx valtrex given incase viral rash worsens or reoccurs    4. RTC 1 year for annual      30 minutes were spent in face to face time with this patient  90 % of this time was spent in counseling and/or coordination of care    Mag RAMSAY [facility]  Division of Female Pelvic Medicine and Reconstructive Surgery  Department of Obstetrics & Gynecology

## 2023-09-13 ENCOUNTER — PATIENT MESSAGE (OUTPATIENT)
Dept: UROGYNECOLOGY | Facility: CLINIC | Age: 63
End: 2023-09-13
Payer: COMMERCIAL

## 2023-09-15 ENCOUNTER — TELEPHONE (OUTPATIENT)
Dept: UROGYNECOLOGY | Facility: CLINIC | Age: 63
End: 2023-09-15
Payer: COMMERCIAL

## 2023-09-15 NOTE — TELEPHONE ENCOUNTER
----- Message from Bo Goldstein sent at 9/15/2023  9:22 AM CDT -----   Name of Who is Calling:     What is the request in detail:  patient request call back in reference to medication/patient  want to know if ready for  Please contact to further discuss and advise      Can the clinic reply by [facility]:     What Number to Call Back if not in [facility]:  [phone]

## 2023-09-15 NOTE — TELEPHONE ENCOUNTER
Informed pt prescription is here and ready for . Pt voiced understanding and stated she's on her way. Call ended.

## 2023-12-04 ENCOUNTER — PATIENT MESSAGE (OUTPATIENT)
Dept: UROGYNECOLOGY | Facility: CLINIC | Age: 63
End: 2023-12-04
Payer: COMMERCIAL

## 2023-12-15 ENCOUNTER — TELEPHONE (OUTPATIENT)
Dept: UROGYNECOLOGY | Facility: CLINIC | Age: 63
End: 2023-12-15
Payer: COMMERCIAL

## 2023-12-15 ENCOUNTER — PATIENT MESSAGE (OUTPATIENT)
Dept: UROGYNECOLOGY | Facility: CLINIC | Age: 63
End: 2023-12-15
Payer: COMMERCIAL

## 2023-12-15 NOTE — TELEPHONE ENCOUNTER
Pt states that her medication comes in to [facility] from [facility]. States that it gets lost in the mail so it comes directly to [facility].  Also told pt that [facility] was out of the office and that I would consult with another on call nurse to see if they could help. Pt stated that she is okay with waiting for [facility] to return to office on Monday. Verbalized understanding and call ended.

## 2024-01-26 RX ORDER — FLUTICASONE PROPIONATE 50 MCG
2 SPRAY, SUSPENSION (ML) NASAL
Qty: 16 ML | Refills: 2 | Status: SHIPPED | OUTPATIENT
Start: 2024-01-26

## 2024-01-26 NOTE — TELEPHONE ENCOUNTER
No care due was identified.  [facility] Embedded Care Due Messages. Reference number: [id number].   1/26/2024 6:14:34 AM CST

## 2024-01-26 NOTE — TELEPHONE ENCOUNTER
Refill Routing Note   Medication(s) are not appropriate for processing by [facility] for the following reason(s):        No active prescription written by provider    [facility] action(s):  Defer               Appointments  past 12m or future 3m with PCP    Date Provider   Last Visit   7/19/2023 Ger Lim III, MD   Next Visit   Visit date not found Ger Lim III, MD   ED visits in past 90 days: 0        Note composed:11:07 AM 01/26/2024

## 2024-02-28 ENCOUNTER — HOSPITAL ENCOUNTER (OUTPATIENT)
Dept: RADIOLOGY | Facility: HOSPITAL | Age: 64
Discharge: HOME | End: 2024-02-28
Attending: INTERNAL MEDICINE
Payer: COMMERCIAL

## 2024-02-28 DIAGNOSIS — Z12.31 ENCOUNTER FOR SCREENING MAMMOGRAM FOR BREAST CANCER: ICD-10-CM

## 2024-02-28 PROCEDURE — 77067 SCR MAMMO BI INCL CAD: CPT | Mod: 26,,, | Performed by: RADIOLOGY

## 2024-02-28 PROCEDURE — 77063 BREAST TOMOSYNTHESIS BI: CPT | Mod: 26,,, | Performed by: RADIOLOGY

## 2024-02-28 PROCEDURE — 77067 SCR MAMMO BI INCL CAD: CPT | Mod: TC

## 2024-03-06 ENCOUNTER — OFFICE VISIT (OUTPATIENT)
Dept: OTOLARYNGOLOGY | Facility: CLINIC | Age: 64
End: 2024-03-06
Payer: COMMERCIAL

## 2024-03-06 VITALS
BODY MASS INDEX: 30.67 KG/M2 | DIASTOLIC BLOOD PRESSURE: 96 MMHG | HEART RATE: 103 BPM | WEIGHT: 184.31 LBS | SYSTOLIC BLOOD PRESSURE: 141 MMHG

## 2024-03-06 DIAGNOSIS — M26.621 ARTHRALGIA OF RIGHT TEMPOROMANDIBULAR JOINT: Primary | ICD-10-CM

## 2024-03-06 DIAGNOSIS — J31.0 CHRONIC RHINITIS: ICD-10-CM

## 2024-03-06 DIAGNOSIS — J34.2 DEVIATED NASAL SEPTUM: ICD-10-CM

## 2024-03-06 PROCEDURE — 3008F BODY MASS INDEX DOCD: CPT | Mod: CPTII,S$GLB,, | Performed by: PHYSICIAN ASSISTANT

## 2024-03-06 PROCEDURE — 99204 OFFICE O/P NEW MOD 45 MIN: CPT | Mod: S$GLB,,, | Performed by: PHYSICIAN ASSISTANT

## 2024-03-06 PROCEDURE — 1159F MED LIST DOCD IN RCRD: CPT | Mod: CPTII,S$GLB,, | Performed by: PHYSICIAN ASSISTANT

## 2024-03-06 PROCEDURE — 3077F SYST BP >= 140 MM HG: CPT | Mod: CPTII,S$GLB,, | Performed by: PHYSICIAN ASSISTANT

## 2024-03-06 PROCEDURE — 3080F DIAST BP >= 90 MM HG: CPT | Mod: CPTII,S$GLB,, | Performed by: PHYSICIAN ASSISTANT

## 2024-03-06 PROCEDURE — 99999 PR PBB SHADOW E&M-EST. PATIENT-LVL III: CPT | Mod: PBBFAC,,, | Performed by: PHYSICIAN ASSISTANT

## 2024-03-06 RX ORDER — METHOCARBAMOL 500 MG/1
500 TABLET, FILM COATED ORAL 4 TIMES DAILY PRN
Qty: 30 TABLET | Refills: 0 | Status: SHIPPED | OUTPATIENT
Start: 2024-03-06 | End: 2024-03-16

## 2024-03-06 NOTE — PATIENT INSTRUCTIONS
Here are some treatment options commonly used for TMJ:    Self-Care Practices: Several self-care measures can help alleviate TMJ symptoms. These include practicing good posture, avoiding hard or chewy foods, applying ice or heat packs to the affected area, and gentle jaw exercises recommended by a healthcare professional.    Medications: Over-the-counter pain relievers such as acetaminophen or nonsteroidal anti-inflammatory drugs (NSAIDs) like ibuprofen can help reduce pain and inflammation associated with TMJ. Muscle relaxants may also be prescribed by a healthcare professional in certain cases.    Oral Splints or Mouthguards: A custom-fitted oral splint or mouthguard can help stabilize the jaw and relieve pressure on the TMJ. These devices are usually worn at night and are designed to improve jaw alignment and reduce grinding or clenching habits.    Physical Therapy: Physical therapy techniques like ultrasound, stretching exercises, and jaw muscle strengthening exercises can be beneficial for some TMJ patients. A trained therapist can guide you through these exercises and provide additional treatments such as massage or heat therapy.    Stress Management: Stress and anxiety can contribute to TMJ symptoms. Incorporating stress management techniques like relaxation exercises, meditation, or counseling can help reduce muscle tension and improve overall well-being.    Dental Procedures: In some cases, dental treatments may be recommended. These may include orthodontic interventions to correct bite alignment issues, dental restorations to improve the bite, or in rare cases, surgery for severe TMJ problems that don't respond to other treatments.         LARYNGOPHARYNGEAL REFLUX  (SILENT OR ATYPICAL REFLUX)    LPR is a very challenging disease as it includes a vast range of symptoms and difficult to diagnose.      SYMPTOMS  If you have any of the following symptoms you MAY have laryngopharyngeal reflux (LPR):   "  1. Hoarseness  2. Sore throat  4. throat clearing, sometimes clearing thick mucous  5. chronic cough, especially cough that wakes you up from sleep  6.  "postnasal drip" without the need to blow your nose  7. Globus sensation, like the sensation of something being stuck in the throat  8.  Red, swollen or irritated larynx (voice box)    HOW  Many people with LPR do not have symptoms of heartburn. Compared to the esophagus, the voice box and the back of the throat are significantly more sensitive to the effects of acid and digestive enzymes on surrounding tissue. Acid passing quickly through the esophagus does not have a chance to irritate the area for too long.  However acid that pools in the throat or voice box can cause prolonged irritation resulting in the symptoms of LPR.    DIAGNOSIS  A common procedure performed by an ENT is called flexible laryngoscopy.  A thin tube is inserted through the nose in order to visualize the larynx (the voice box). This method can detect abnormalities in the voice box ranging from polyps to laryngeal cancer.  This can also reveal signs of LPR, but is not 100% reliable.      If symptoms persist despite medical treatment listed below, further testing is needed.  Three commonly used tests are: a swallowing study; a direct look at the stomach and esophagus through an endoscope, and; an esophageal pH test.  Further testing is usually coordinated with a gastroenterologist.     TREATMENT  Lifestyle changes  1. Do not smoke.  Smoking will worsen reflux.    2. Avoid eating at least 2-3 hours prior to bedtime.  Try to avoid very large meals at night.    4. Weight loss.  For patient's with recent weight gain, shedding a few pounds is all that is required to improve reflux.    5. Avoid reflux triggers. These can worsen acid in the stomach or even cause the esophagus muscles to relax: caffeine, soft drinks, acidic foods (tomato, lots of fruit) mints, alcoholic beverages, particularly at " night, cheese, fried foods, spicy foods, eggs, and chocolate.    6. Sleep with the head of bed elevated at least 6 inches.    Consider reading the book Dropping Acid by Juan Diego Mohamud MD.  This has information to help choose meals with less acid.     Other Treatments:  All natural remedies include Reflux Gourmet (and Reflux Raft) which create a temporary protective barrier in your stomach to prevent reflux into your esophagus. This can be an effective therapy without the side effects of normal acid reducing medications and was also developed by laryngologists (LPR specialists).   Speech Therapy: education and techniques aimed at helping you control the cough     Medications  Take over-the-counter (OTC) medications, including antacids, such as Gaviscon Advance (others include Tums®, Maalox®, or Mylanta) as needed  Prescription and OTC stomach acid reducers: H2 blockers such as famotidine (Pepcid®), cimetidine (Tagamet®), ranitidine (Zantac®) OR proton pump inhibitors (PPIs), such as omeprazole (Prilosec®), pantoprazole (Protonix®), and esomeprazole (Nexium®).  Most patients will begin to notice some relief in their symptoms about 2-4 weeks after starting an acid reducing medication; however it is generally recommended the medication should be continued for at least 2 months. If the symptoms completely resolve, the medication can then be tapered.  Some people will remain symptom free while others may have relapses which required treatment again.

## 2024-03-06 NOTE — PROGRESS NOTES
"Subjective:     HPI: Mari Ambrocio is a 63 y.o. female who was self-referred for  ear pain .    Patient reports constant, sharp right sided ear pain ("sharp") associated with right sided headache and jaw pain x 3 weeks. Patient was seen by PCP a week ago and was started on Amoxicillin and found no improvement. She reports jaw pain is worse with chewing or talking. She admits to clenching jaw and grinding her teeth at night. Patient does use a mouth guard while asleep, but only recently restarted.   She was seen by a dentist recently and told it was not a tooth problem or TMJ.  Patient denies dysphagia, odynophagia.     Patient also reports chronic post nasal drip > 1 year. She admits to throat clearing. Reports symptoms are worse when lying down. She denies cough, nasal congestion, rhinorrhea, discolored mucus, or sinus pressure. States last sinus infection requiring antibiotics was November 2022.     Current sinonasal medications include none at present.  Reported number of sinus infections requiring antibiotics per year 2  She does not recall previously having allergy testing.  She denies a history of asthma.  She denies a history of reflux symptoms.    She denies a diagnosis of obstructive sleep apnea.   She does not recall a prior history of nasal trauma.  She has not had sinonasal surgery.    She has not had a tonsillectomy.  She is not a tobacco smoker. Former  She has not had SOB/wheezing from NSAID use.         %  ETDQ-7 score:: (P) 3.9    Past Medical/Past Surgical History  Past Medical History:   Diagnosis Date    Ganglion cyst     History of shingles     Hyperlipidemia     Kidney stone     Otitis media     PONV (postoperative nausea and vomiting)     Sciatica     Urinary tract infection      She has a past surgical history that includes Ganglion cyst excision; urethra widened; Colonoscopy (N/A, 4/11/2018); Tubal ligation (1998); Robot-assisted laparoscopic hysterectomy (N/A, 7/6/2020); Robot-assisted " laparoscopic abdominal sacrocolpopexy (N/A, 7/6/2020); Laparoscopic salpingo-oophorectomy (N/A, 7/6/2020); Insertion of midurethral sling (N/A, 7/6/2020); Cystoscopy (N/A, 7/6/2020); Hysterectomy; and Oophorectomy.    Family History/Social History  Her family history includes Breast cancer in her maternal grandmother; Cancer in her maternal grandmother; Dementia in her mother; Depression in her father.  She reports that she quit smoking about 15 years ago. Her smoking use included cigarettes. She has never used smokeless tobacco. She reports that she does not drink alcohol and does not use drugs.    Allergies/Immunizations  She is allergic to bananas [banana], cantaloupe, cucumbers [cucumber fruit extract], walnut, and watermelon.  Immunization History   Administered Date(s) Administered    COVID-19, MRNA, LN-S, PF (Pfizer) (Gray Cap) 04/21/2022    COVID-19, MRNA, LN-S, PF (Pfizer) (Purple Cap) 03/26/2021, 04/18/2021, 12/09/2021    COVID-19, mRNA, LNP-S, PF (Moderna 2023)Ages 12+ 09/23/2023    Influenza 10/01/2018    Influenza - Quadrivalent - MDCK 09/30/2020    Influenza - Quadrivalent - MDCK - PF 09/26/2019, 09/30/2020    Influenza - Quadrivalent - PF *Preferred* (6 months and older) 10/09/2016    Influenza Split 10/24/2012, 10/24/2013    Tdap 01/03/2017        Medications   fluticasone propionate  ibuprofen  methocarbamoL Tab  PREMARIN VAGL     Review of Systems     Constitutional: Negative for chills and fever.      HENT: Positive for ear pain and postnasal drip.  Negative for runny nose, sinus infection, sinus pressure, sore throat, dental problems and trouble swallowing.      Respiratory:  Negative for cough.      Gastrointestinal:  Negative for acid reflux.     Neurological: Negative for headaches.      Psychiatric: Negative for nervous/anxious.            Objective:     BP (!) 141/96 (BP Location: Right arm, Patient Position: Sitting)   Pulse 103   Wt 83.6 kg (184 lb 4.9 oz)   LMP 06/01/2000   BMI 30.67  kg/m²        Constitutional:   She is oriented to person, place, and time. She appears well-developed and well-nourished. She appears alert. She is active. Normal speech.      Head:  Normocephalic and atraumatic. Head is with TMJ tenderness.     Ears:    Right Ear: No drainage or tenderness. Tympanic membrane is not scarred, not perforated, not erythematous, not retracted and not bulging. No middle ear effusion.   Left Ear: No drainage or tenderness. Tympanic membrane is not scarred, not perforated, not erythematous, not retracted and not bulging.  No middle ear effusion.    erythema of AU EAC but no signs of infections    Nose:  Septal deviation (to the left) present. No mucosal edema, rhinorrhea or sinus tenderness. No turbinate hypertrophy.  Right sinus exhibits no maxillary sinus tenderness and no frontal sinus tenderness. Left sinus exhibits no maxillary sinus tenderness and no frontal sinus tenderness.     Mouth/Throat  Oropharynx clear and moist without lesions or asymmetry, abnormal uvula midline, lips, teeth, and gums normal and oropharynx normal. No posterior oropharyngeal edema or posterior oropharyngeal erythema. Tonsils present, +1.    Patient unable to fully open mouth due to right jaw pain.       Neck:  Thyroid normal.     She has no cervical adenopathy.     Cardiovascular:    Normal rate, regular rhythm and rate and rhythm, heart sounds, and pulses normal.              Pulmonary/Chest:   Effort normal and effort and breath sounds normal.     Psychiatric:   She has a normal mood and affect. Her speech is normal and behavior is normal.     Neurological:   She is alert and oriented to person, place, and time.       Procedure    None    Data Reviewed  I personally reviewed the chart, including any outside records, and pertinent data below:    I reviewed the following notes: Internal medicine    WBC (K/uL)   Date Value   07/19/2023 7.48     Eosinophil % (%)   Date Value   07/07/2020 0.0     Eos # (K/uL)  "  Date Value   07/07/2020 0.0     Platelets (K/uL)   Date Value   07/19/2023 252     Glucose (mg/dL)   Date Value   07/19/2023 78     No results found for: "IGE"    No sinus imaging available.    Assessment & Plan:     1. Arthralgia of right temporomandibular joint     -Ear exam WNL, no signs of infection.    Discussed with patient the etiology of TMJ syndrome and management strategies.    "Jaw rest" (soft foods, smaller bites, no crunchy foods, gum or ice chewing) x2 weeks    ice pack on jaw joint and stress reduction/ behavioral management.    Robaxin prn   Continue mouth guard use nightly  methocarbamoL (ROBAXIN) 500 MG Tab; Take 1 tablet (500 mg total) by mouth 4 (four) times daily as needed (jaw pain).  Dispense: 30 tablet; Refill: 0    2. Chronic rhinitis  3. Deviated nasal septum  - Trial nasal saline rinse  - Advised patient to avoid caffeine, chocolate, or acidic foods for relief of post nasal drip.  - discussed LPR and provided information regarding condition    She will Follow up in about 3 weeks (around 3/27/2024) for re-evaluate post treatment, possible laryngoscopy.  I had a discussion with the patient regarding her condition and the further workup and management options.    All questions were answered, and the patient is in agreement with the above.     Disclaimer:  This note may have been prepared utilizing voice recognition software which may result in occasional typographical errors in the text such as sound alike words.   If further clarification is needed, please contact the ENT department of [facility].  "

## 2024-05-15 ENCOUNTER — PATIENT MESSAGE (OUTPATIENT)
Dept: UROGYNECOLOGY | Facility: CLINIC | Age: 64
End: 2024-05-15
Payer: COMMERCIAL

## 2024-05-16 ENCOUNTER — PATIENT MESSAGE (OUTPATIENT)
Dept: ADMINISTRATIVE | Facility: HOSPITAL | Age: 64
End: 2024-05-16
Payer: COMMERCIAL

## 2024-05-16 ENCOUNTER — PATIENT OUTREACH (OUTPATIENT)
Dept: ADMINISTRATIVE | Facility: HOSPITAL | Age: 64
End: 2024-05-16
Payer: COMMERCIAL

## 2024-05-16 DIAGNOSIS — Z12.11 SPECIAL SCREENING FOR MALIGNANT NEOPLASMS, COLON: Primary | ICD-10-CM

## 2024-06-25 DIAGNOSIS — Z00.00 ENCOUNTER FOR MEDICARE ANNUAL WELLNESS EXAM: ICD-10-CM

## 2024-06-26 ENCOUNTER — OFFICE VISIT (OUTPATIENT)
Dept: FAMILY MEDICINE | Facility: CLINIC | Age: 64
End: 2024-06-26
Payer: MEDICARE

## 2024-06-26 ENCOUNTER — PATIENT OUTREACH (OUTPATIENT)
Dept: ADMINISTRATIVE | Facility: OTHER | Age: 64
End: 2024-06-26
Payer: MEDICARE

## 2024-06-26 VITALS
HEIGHT: 65 IN | BODY MASS INDEX: 30.12 KG/M2 | OXYGEN SATURATION: 98 % | DIASTOLIC BLOOD PRESSURE: 82 MMHG | WEIGHT: 180.75 LBS | HEART RATE: 75 BPM | SYSTOLIC BLOOD PRESSURE: 126 MMHG

## 2024-06-26 DIAGNOSIS — Z01.818 PRE-OP EVALUATION: Primary | ICD-10-CM

## 2024-06-26 DIAGNOSIS — R68.89 OTHER GENERAL SYMPTOMS AND SIGNS: ICD-10-CM

## 2024-06-26 DIAGNOSIS — R79.9 ABNORMAL FINDING OF BLOOD CHEMISTRY, UNSPECIFIED: ICD-10-CM

## 2024-06-26 DIAGNOSIS — Z00.00 ANNUAL PHYSICAL EXAM: Primary | ICD-10-CM

## 2024-06-26 PROCEDURE — 1101F PT FALLS ASSESS-DOCD LE1/YR: CPT | Mod: CPTII,S$GLB,, | Performed by: STUDENT IN AN ORGANIZED HEALTH CARE EDUCATION/TRAINING PROGRAM

## 2024-06-26 PROCEDURE — 1160F RVW MEDS BY RX/DR IN RCRD: CPT | Mod: CPTII,S$GLB,, | Performed by: STUDENT IN AN ORGANIZED HEALTH CARE EDUCATION/TRAINING PROGRAM

## 2024-06-26 PROCEDURE — 99214 OFFICE O/P EST MOD 30 MIN: CPT | Mod: S$GLB,,, | Performed by: STUDENT IN AN ORGANIZED HEALTH CARE EDUCATION/TRAINING PROGRAM

## 2024-06-26 PROCEDURE — 3074F SYST BP LT 130 MM HG: CPT | Mod: CPTII,S$GLB,, | Performed by: STUDENT IN AN ORGANIZED HEALTH CARE EDUCATION/TRAINING PROGRAM

## 2024-06-26 PROCEDURE — 3008F BODY MASS INDEX DOCD: CPT | Mod: CPTII,S$GLB,, | Performed by: STUDENT IN AN ORGANIZED HEALTH CARE EDUCATION/TRAINING PROGRAM

## 2024-06-26 PROCEDURE — 1159F MED LIST DOCD IN RCRD: CPT | Mod: CPTII,S$GLB,, | Performed by: STUDENT IN AN ORGANIZED HEALTH CARE EDUCATION/TRAINING PROGRAM

## 2024-06-26 PROCEDURE — 3288F FALL RISK ASSESSMENT DOCD: CPT | Mod: CPTII,S$GLB,, | Performed by: STUDENT IN AN ORGANIZED HEALTH CARE EDUCATION/TRAINING PROGRAM

## 2024-06-26 PROCEDURE — 3079F DIAST BP 80-89 MM HG: CPT | Mod: CPTII,S$GLB,, | Performed by: STUDENT IN AN ORGANIZED HEALTH CARE EDUCATION/TRAINING PROGRAM

## 2024-06-26 PROCEDURE — 99999 PR PBB SHADOW E&M-EST. PATIENT-LVL III: CPT | Mod: PBBFAC,,, | Performed by: STUDENT IN AN ORGANIZED HEALTH CARE EDUCATION/TRAINING PROGRAM

## 2024-06-26 PROCEDURE — 1157F ADVNC CARE PLAN IN RCRD: CPT | Mod: CPTII,S$GLB,, | Performed by: STUDENT IN AN ORGANIZED HEALTH CARE EDUCATION/TRAINING PROGRAM

## 2024-06-26 NOTE — PROGRESS NOTES
CHW - Outreach Attempt    Community Health Worker left a In Basket message for 1st attempt to contact patient regarding: SDOH  Community Health Worker to attempt to contact patient on: 7/10  CHW - Case Closure    This Community Health Worker spoke to patient via telephone today.   Pt/Caregiver reported: Pt denied needing any assistance at this time. Pt only needed assistance with changing PCP from Dr. Lim to Dr. Moody, CHW completed the change per pts request. SDOH completed verified updated by CHW  Pt/Caregiver denied any additional needs at this time and agrees with episode closure at this time.  Provided patient with Community Health Worker's contact information and encouraged him/her to contact this Community Health Worker if additional needs arise.

## 2024-06-26 NOTE — PROGRESS NOTES
Progress Note  [facility]    Subjective:     Mari Ambrocio is a 64 y.o. year old female with PMHx of uterovaginal prolapse s/p repair who presents to clinic for pre-op clearance.     Pt having cataract done on July 9th. Not taking anticoagulants. Non= smoker. No problems with anesthesia in the past. No benzo use. No opioid use. No history of HTN. No history of T2DM, well controlled on current regimen.      Patient Active Problem List    Diagnosis Date Noted    Muscle spasm 09/14/2020    Muscle weakness 09/14/2020    Postmenopausal bleeding 03/25/2020    Abnormal pelvic ultrasound 03/25/2020    Rectocele, female 03/25/2020    Uterovaginal prolapse 03/25/2020    Cervical polyp 03/25/2020    Urge urinary incontinence 03/25/2020    Cystocele with prolapse 01/29/2019    Calf cramp 01/29/2019    Allergic contact dermatitis 01/29/2019    History of herpes zoster 01/29/2019    Screening for colon cancer 04/11/2018        Review of patient's allergies indicates:   Allergen Reactions    Bananas [banana]     Cantaloupe     Cucumbers [cucumber fruit extract]     [place]     Watermelon         Past Medical History:   Diagnosis Date    Ganglion cyst     History of shingles     Hyperlipidemia     Kidney stone     Otitis media     PONV (postoperative nausea and vomiting)     Sciatica     Urinary tract infection       Past Surgical History:   Procedure Laterality Date    COLONOSCOPY N/A 4/11/2018    Procedure: COLONOSCOPY Golytely;  Surgeon: Deya Harrington MD;  Location: [facility];  Service: Endoscopy;  Laterality: N/A;    CYSTOSCOPY N/A 7/6/2020    Procedure: CYSTOSCOPY;  Surgeon: Corey Riely MD;  Location: [facility] [place] OR;  Service: OB/GYN;  Laterality: N/A;    GANGLION CYST EXCISION      HYSTERECTOMY      INSERTION OF MIDURETHRAL SLING N/A 7/6/2020    Procedure: SLING, MIDURETHRAL;  Surgeon: Corey Riley MD;  Location: [facility] [place] OR;  Service: OB/GYN;  Laterality: N/A;    LAPAROSCOPIC  "SALPINGO-OOPHORECTOMY N/A 7/6/2020    Procedure: SALPINGO-OOPHORECTOMY, LAPAROSCOPIC;  Surgeon: Corey Riley MD;  Location: [facility];  Service: OB/GYN;  Laterality: N/A;    OOPHORECTOMY      ROBOT-ASSISTED LAPAROSCOPIC ABDOMINAL SACROCOLPOPEXY N/A 7/6/2020    Procedure: ROBOTIC SACROCOLPOPEXY, ABDOMEN;  Surgeon: Corey Riley MD;  Location: [facility] OR;  Service: OB/GYN;  Laterality: N/A;    ROBOT-ASSISTED LAPAROSCOPIC HYSTERECTOMY N/A 7/6/2020    Procedure: ROBOTIC HYSTERECTOMY;  Surgeon: Corey Riley MD;  Location: [facility] OR;  Service: OB/GYN;  Laterality: N/A;    TUBAL LIGATION  1998    urethra widened        Family History   Problem Relation Name Age of Onset    Cancer Maternal Grandmother          breast    Breast cancer Maternal Grandmother      Dementia Mother      Depression Father      Kidney disease Neg Hx      Thyroid disease Neg Hx        Social History     Tobacco Use    Smoking status: Former     Current packs/day: 0.00     Types: Cigarettes     Quit date: 10/24/2008     Years since quitting: 15.6    Smokeless tobacco: Never   Substance Use Topics    Alcohol use: No        Objective:     Vitals:    06/26/24 0922   BP: 126/82   BP Location: Right arm   Patient Position: Sitting   BP Method: Large (Manual)   Pulse: 75   SpO2: 98%   Weight: 82 kg (180 lb 12.4 oz)   Height: 5' 5" (1.651 m)     BMI: 30.08    Gen: No apparent distress, well nourished and developed, appears stated age  CV: RRR, S1 and S2 present, no LE edema  Resp: CTAB, normal respiratory effort      Assessment/Plan:     Mari Ambrocio is a 64 y.o. year old female who presents to clinic for pre-op clearance    1. Pre-op evaluation  Pt medically optimized for cataract surgery at this time. Paperwork completed.      Follow-up: PRN    I spent a total of 30 minutes on the day of the visit.This includes face to face time and non-face to face time preparing to see the patient (eg, review of tests), obtaining and/or reviewing separately obtained " history, documenting clinical information in the electronic or other health record, independently interpreting results and communicating results to the patient/family/caregiver, or care coordinator.      Carlos Moody, DO  Family Medicine

## 2024-07-11 ENCOUNTER — CLINICAL SUPPORT (OUTPATIENT)
Dept: ENDOSCOPY | Facility: HOSPITAL | Age: 64
End: 2024-07-11
Attending: INTERNAL MEDICINE
Payer: MEDICARE

## 2024-07-11 ENCOUNTER — TELEPHONE (OUTPATIENT)
Dept: ENDOSCOPY | Facility: HOSPITAL | Age: 64
End: 2024-07-11
Payer: MEDICARE

## 2024-07-11 DIAGNOSIS — Z78.0 MENOPAUSE: ICD-10-CM

## 2024-07-11 DIAGNOSIS — Z12.11 SPECIAL SCREENING FOR MALIGNANT NEOPLASMS, COLON: ICD-10-CM

## 2024-07-11 NOTE — PLAN OF CARE
Called pt. To schedule Colonoscopy. Pt. Had eye surgery last week and is schedule for other Eye in 3 weeks. Pt. To call back after Post op clearance. New PAT made for pt. To get a call back.

## 2024-07-12 ENCOUNTER — HOSPITAL ENCOUNTER (OUTPATIENT)
Dept: RADIOLOGY | Facility: HOSPITAL | Age: 64
Discharge: HOME | End: 2024-07-12
Attending: STUDENT IN AN ORGANIZED HEALTH CARE EDUCATION/TRAINING PROGRAM
Payer: MEDICARE

## 2024-07-12 DIAGNOSIS — Z78.0 MENOPAUSE: ICD-10-CM

## 2024-07-12 PROCEDURE — 77080 DXA BONE DENSITY AXIAL: CPT | Mod: TC

## 2024-07-12 PROCEDURE — 77080 DXA BONE DENSITY AXIAL: CPT | Mod: 26,,, | Performed by: INTERNAL MEDICINE

## 2024-07-17 ENCOUNTER — PATIENT MESSAGE (OUTPATIENT)
Dept: FAMILY MEDICINE | Facility: CLINIC | Age: 64
End: 2024-07-17
Payer: MEDICARE

## 2024-07-17 DIAGNOSIS — M85.80 OSTEOPENIA, UNSPECIFIED LOCATION: Primary | ICD-10-CM

## 2024-08-08 ENCOUNTER — TELEPHONE (OUTPATIENT)
Dept: ENDOSCOPY | Facility: HOSPITAL | Age: 64
End: 2024-08-08
Payer: MEDICARE

## 2024-08-08 VITALS — BODY MASS INDEX: 29.99 KG/M2 | WEIGHT: 180 LBS | HEIGHT: 65 IN

## 2024-08-08 DIAGNOSIS — Z12.11 SPECIAL SCREENING FOR MALIGNANT NEOPLASMS, COLON: Primary | ICD-10-CM

## 2024-08-08 DIAGNOSIS — Z86.010 HISTORY OF COLON POLYPS: ICD-10-CM

## 2024-08-08 RX ORDER — POLYETHYLENE GLYCOL 3350, SODIUM SULFATE ANHYDROUS, SODIUM BICARBONATE, SODIUM CHLORIDE, POTASSIUM CHLORIDE 236; 22.74; 6.74; 5.86; 2.97 G/4L; G/4L; G/4L; G/4L; G/4L
4 POWDER, FOR SOLUTION ORAL ONCE
Qty: 4000 ML | Refills: 0 | Status: SHIPPED | OUTPATIENT
Start: 2024-08-08 | End: 2024-08-08

## 2024-08-09 ENCOUNTER — ANESTHESIA EVENT (OUTPATIENT)
Dept: ENDOSCOPY | Facility: HOSPITAL | Age: 64
End: 2024-08-09
Payer: MEDICARE

## 2024-08-09 NOTE — ANESTHESIA PREPROCEDURE EVALUATION
08/09/2024  Mari Ambrocio is a 64 y.o., female.  Past Medical History:   Diagnosis Date    Ganglion cyst     History of shingles     Hyperlipidemia     Kidney stone     Otitis media     PONV (postoperative nausea and vomiting)     Sciatica     Urinary tract infection      Past Surgical History:   Procedure Laterality Date    COLONOSCOPY N/A 04/11/2018    Procedure: COLONOSCOPY Golytely;  Surgeon: Deya Harrington MD;  Location: [facility];  Service: Endoscopy;  Laterality: N/A;    CYSTOSCOPY N/A 07/06/2020    Procedure: CYSTOSCOPY;  Surgeon: Corey Riley MD;  Location: [facility];  Service: OB/GYN;  Laterality: N/A;    GANGLION CYST EXCISION      HYSTERECTOMY      INSERTION OF MIDURETHRAL SLING N/A 07/06/2020    Procedure: SLING, MIDURETHRAL;  Surgeon: Corey Riley MD;  Location: [facility];  Service: OB/GYN;  Laterality: N/A;    LAPAROSCOPIC SALPINGO-OOPHORECTOMY N/A 07/06/2020    Procedure: SALPINGO-OOPHORECTOMY, LAPAROSCOPIC;  Surgeon: Corey Riley MD;  Location: [facility];  Service: OB/GYN;  Laterality: N/A;    OOPHORECTOMY      ROBOT-ASSISTED LAPAROSCOPIC ABDOMINAL SACROCOLPOPEXY N/A 07/06/2020    Procedure: ROBOTIC SACROCOLPOPEXY, ABDOMEN;  Surgeon: Corey Riley MD;  Location: [facility];  Service: OB/GYN;  Laterality: N/A;    ROBOT-ASSISTED LAPAROSCOPIC HYSTERECTOMY N/A 07/06/2020    Procedure: ROBOTIC HYSTERECTOMY;  Surgeon: Corey Riley MD;  Location: [facility];  Service: OB/GYN;  Laterality: N/A;    TUBAL LIGATION  1998    urethra widened           Pre-op Assessment    I have reviewed the Patient Summary Reports.     I have reviewed the Nursing Notes. I have reviewed the NPO Status.   I have reviewed the Medications.     Review of Systems  Anesthesia Hx:    PONV              Social:  Non-Smoker, Alcohol Use       Hematology/Oncology:  Hematology Normal   Oncology Normal                                    EENT/Dental:  EENT/Dental Normal           Cardiovascular:                hyperlipidemia                             Pulmonary:  Pulmonary Normal                       Renal/:   renal calculi               Hepatic/GI:  Hepatic/GI Normal                 Musculoskeletal:  Musculoskeletal Normal                Endocrine:  Endocrine Normal            Dermatological:  Skin Normal    Psych:  Psychiatric Normal                  Physical Exam    Airway:  Mallampati: II     Anesthesia Plan  Type of Anesthesia, risks & benefits discussed:    Anesthesia Type: Gen Natural Airway  Intra-op Monitoring Plan: Standard ASA Monitors  Induction:  IV  Informed Consent: Informed consent signed with the Patient and all parties understand the risks and agree with anesthesia plan.  All questions answered.   ASA Score: 2  Day of Surgery Review of History & Physical: H&P Update referred to the surgeon/provider.    Ready For Surgery From Anesthesia Perspective.     .

## 2024-08-11 ENCOUNTER — PATIENT MESSAGE (OUTPATIENT)
Dept: UROGYNECOLOGY | Facility: CLINIC | Age: 64
End: 2024-08-11
Payer: MEDICARE

## 2024-08-12 ENCOUNTER — HOSPITAL ENCOUNTER (OUTPATIENT)
Facility: HOSPITAL | Age: 64
Discharge: HOME | End: 2024-08-12
Attending: INTERNAL MEDICINE | Admitting: INTERNAL MEDICINE
Payer: MEDICARE

## 2024-08-12 ENCOUNTER — ANESTHESIA (OUTPATIENT)
Dept: ENDOSCOPY | Facility: HOSPITAL | Age: 64
End: 2024-08-12
Payer: MEDICARE

## 2024-08-12 VITALS
RESPIRATION RATE: 16 BRPM | TEMPERATURE: 99 F | HEART RATE: 68 BPM | SYSTOLIC BLOOD PRESSURE: 158 MMHG | DIASTOLIC BLOOD PRESSURE: 72 MMHG | OXYGEN SATURATION: 99 %

## 2024-08-12 DIAGNOSIS — N95.2 VAGINAL ATROPHY: Primary | ICD-10-CM

## 2024-08-12 DIAGNOSIS — Z86.010 HISTORY OF COLON POLYPS: Primary | ICD-10-CM

## 2024-08-12 PROCEDURE — 88305 TISSUE EXAM BY PATHOLOGIST: CPT | Mod: 26,,, | Performed by: PATHOLOGY

## 2024-08-12 PROCEDURE — 94761 N-INVAS EAR/PLS OXIMETRY MLT: CPT

## 2024-08-12 PROCEDURE — 63600175 PHARM REV CODE 636 W HCPCS: Performed by: NURSE ANESTHETIST, CERTIFIED REGISTERED

## 2024-08-12 PROCEDURE — 37000008 HC ANESTHESIA 1ST 15 MINUTES: Performed by: INTERNAL MEDICINE

## 2024-08-12 PROCEDURE — 88305 TISSUE EXAM BY PATHOLOGIST: CPT | Mod: 59 | Performed by: PATHOLOGY

## 2024-08-12 PROCEDURE — 27201089 HC SNARE, DISP (ANY): Performed by: INTERNAL MEDICINE

## 2024-08-12 PROCEDURE — 45385 COLONOSCOPY W/LESION REMOVAL: CPT | Mod: PT | Performed by: INTERNAL MEDICINE

## 2024-08-12 PROCEDURE — 25000003 PHARM REV CODE 250: Performed by: NURSE ANESTHETIST, CERTIFIED REGISTERED

## 2024-08-12 PROCEDURE — 37000009 HC ANESTHESIA EA ADD 15 MINS: Performed by: INTERNAL MEDICINE

## 2024-08-12 PROCEDURE — 99900035 HC TECH TIME PER 15 MIN (STAT)

## 2024-08-12 PROCEDURE — 25000003 PHARM REV CODE 250: Performed by: INTERNAL MEDICINE

## 2024-08-12 PROCEDURE — 45385 COLONOSCOPY W/LESION REMOVAL: CPT | Mod: PT,,, | Performed by: INTERNAL MEDICINE

## 2024-08-12 RX ORDER — SODIUM CHLORIDE 9 MG/ML
INJECTION, SOLUTION INTRAVENOUS CONTINUOUS
Status: DISCONTINUED | OUTPATIENT
Start: 2024-08-12 | End: 2024-08-12 | Stop reason: HOSPADM

## 2024-08-12 RX ORDER — CONJUGATED ESTROGENS 0.62 MG/G
1 CREAM VAGINAL
Qty: 1 APPLICATOR | Refills: 0 | Status: SHIPPED | OUTPATIENT
Start: 2024-08-12 | End: 2025-08-12

## 2024-08-12 RX ORDER — PROPOFOL 10 MG/ML
VIAL (ML) INTRAVENOUS CONTINUOUS PRN
Status: DISCONTINUED | OUTPATIENT
Start: 2024-08-12 | End: 2024-08-12

## 2024-08-12 RX ORDER — PROPOFOL 10 MG/ML
VIAL (ML) INTRAVENOUS
Status: DISCONTINUED | OUTPATIENT
Start: 2024-08-12 | End: 2024-08-12

## 2024-08-12 RX ORDER — LIDOCAINE HYDROCHLORIDE 20 MG/ML
INJECTION INTRAVENOUS
Status: DISCONTINUED | OUTPATIENT
Start: 2024-08-12 | End: 2024-08-12

## 2024-08-12 RX ADMIN — PROPOFOL 175 MCG/KG/MIN: 10 INJECTION, EMULSION INTRAVENOUS at 02:08

## 2024-08-12 RX ADMIN — LIDOCAINE HYDROCHLORIDE 60 MG: 20 INJECTION INTRAVENOUS at 02:08

## 2024-08-12 RX ADMIN — PROPOFOL 100 MG: 10 INJECTION, EMULSION INTRAVENOUS at 02:08

## 2024-08-12 RX ADMIN — SODIUM CHLORIDE: 0.9 INJECTION, SOLUTION INTRAVENOUS at 02:08

## 2024-08-12 NOTE — PLAN OF CARE
Pt in preop bay 6, VSS, and IV inserted. Pt denies any open wounds on body or the use of any weight loss injections. Pt needs procedural consents signed, otherwise ready to roll.

## 2024-08-12 NOTE — PLAN OF CARE
Pt vss. Iv removed with cath tip intact.  Pt given d/c instructions and wheeled to personal vehicle via w/c.

## 2024-08-12 NOTE — H&P
Short Stay Endoscopy History and Physical    PCP - Carlos Moody DO    Procedure - Colonoscopy  Sedation: GA  ASA - per anesthesia  Mallampati - per anesthesia  History of Anesthesia problems - no  Family history Anesthesia problems -  no     HPI:  This is a 64 y.o. female here for evaluation of : History of colon polyps    Reflux - no  Dysphagia - no  Abdominal pain - no  Diarrhea - no    ROS:  Constitutional: No fevers, chills, No weight loss  ENT: No allergies  CV: No chest pain  Pulm: No cough, No shortness of breath  Ophtho: No vision changes  GI: see HPI  Medical History:  has a past medical history of Ganglion cyst, History of shingles, Hyperlipidemia, Kidney stone, Otitis media, PONV (postoperative nausea and vomiting), Sciatica, and Urinary tract infection.    Surgical History:  has a past surgical history that includes Ganglion cyst excision; urethra widened; Colonoscopy (N/A, 04/11/2018); Tubal ligation (1998); Robot-assisted laparoscopic hysterectomy (N/A, 07/06/2020); Robot-assisted laparoscopic abdominal sacrocolpopexy (N/A, 07/06/2020); Laparoscopic salpingo-oophorectomy (N/A, 07/06/2020); Insertion of midurethral sling (N/A, 07/06/2020); Cystoscopy (N/A, 07/06/2020); Hysterectomy; and Oophorectomy.    Family History: family history includes Alcohol abuse in her father; Breast cancer in her maternal grandmother; Cancer in her maternal grandmother; Dementia in her mother; Depression in her father.. Otherwise no colon cancer, inflammatory bowel disease, or GI malignancies.    Social History:  reports that she quit smoking about 15 years ago. Her smoking use included cigarettes. She has never used smokeless tobacco. She reports that she does not drink alcohol and does not use drugs.    Review of patient's allergies indicates:   Allergen Reactions    Bananas [banana]     Cantaloupe     Cucumbers [cucumber fruit extract]     [place]     Watermelon        Medications:   Medications Prior to Admission    Medication Sig Dispense Refill Last Dose    conjugated estrogens (PREMARIN) vaginal cream Place 1 g vaginally twice a week. Patient has not been seen by this clinic in over 1 year. Needs appointment for refills. 1 applicator 0        Objective Findings:    Vital Signs: Per nursing notes.    Physical Exam:  General Appearance: Well appearing in no acute distress  Head:   Normocephalic, without obvious abnormality  Eyes:    No scleral icterus  Airway: Open  Neck: No restriction in mobility  Lungs: CTA bilaterally in anterior and posterior fields, no wheezes, no crackles.  Heart:  Regular rate and rhythm, S1, S2 normal, no murmurs heard  Abdomen: Soft, non tender, non distended      Labs:  Lab Results   Component Value Date    WBC 7.48 07/19/2023    HGB 15.0 07/19/2023    HCT 46.1 07/19/2023     07/19/2023    CHOL 160 07/07/2021    TRIG 105 07/07/2021    HDL 49 07/07/2021    ALT 15 07/19/2023    AST 17 07/19/2023     07/19/2023    K 4.6 07/19/2023     07/19/2023    CREATININE 0.8 07/19/2023    BUN 17 07/19/2023    CO2 23 07/19/2023    TSH 1.470 02/01/2018    HGBA1C 5.4 07/07/2021         I have explained the risks and benefits of endoscopy procedures to the patient including but not limited to bleeding, perforation, infection, and death.    Thank you so much for allowing me to participate in the care of Mari Lolly Loya MD

## 2024-08-12 NOTE — TRANSFER OF CARE
Anesthesia Transfer of Care Note    Patient: Mari Ambrocio    Procedure(s) Performed: Procedure(s) (LRB):  COLONOSCOPY (N/A)    Patient location: PACU    Anesthesia Type: general    Transport from OR: Transported from OR on 6-10 L/min O2 by face mask with adequate spontaneous ventilation    Post pain: adequate analgesia    Post vital signs: stable    Level of consciousness: sedated    Nausea/Vomiting: no nausea/vomiting    Complications: none    Transfer of care protocol was followed      Last vitals: Visit Vitals  /87 (Patient Position: Lying)   Pulse 83   Temp 36.8 °C (98.2 °F) (Temporal)   Resp 16   LMP 06/01/2000   SpO2 98%   Breastfeeding No

## 2024-08-12 NOTE — PROVATION PATIENT INSTRUCTIONS
Discharge Summary/Instructions after an Endoscopic Procedure  Patient Name: Mari Ambrocio  Patient MRN: [id number]  Patient YOB: 1960 Monday, August 12, 2024  Sukumar Loya MD  Dear patient,  As a result of recent federal legislation (The Federal Cures Act), you may   receive lab or pathology results from your procedure in your [facility]   account before your physician is able to contact you. Your physician or   their representative will relay the results to you with their   recommendations at their soonest availability.  Thank you,  RESTRICTIONS:  During your procedure today, you received medications for sedation.  These   medications may affect your judgment, balance and coordination.  Therefore,   for 24 hours, you have the following restrictions:   - DO NOT drive a car, operate machinery, make legal/financial decisions,   sign important papers or drink alcohol.    ACTIVITY:  Today: no heavy lifting, straining or running due to procedural   sedation/anesthesia.  The following day: return to full activity including work.  DIET:  Eat and drink normally unless instructed otherwise.     TREATMENT FOR COMMON SIDE EFFECTS:  - Mild abdominal pain, nausea, belching, bloating or excessive gas:  rest,   eat lightly and use a heating pad.  - Sore Throat: treat with throat lozenges and/or gargle with warm salt   water.  - Because air was used during the procedure, expelling large amounts of air   from your rectum or belching is normal.  - If a bowel prep was taken, you may not have a bowel movement for 1-3 days.    This is normal.  SYMPTOMS TO WATCH FOR AND REPORT TO YOUR PHYSICIAN:  1. Abdominal pain or bloating, other than gas cramps.  2. Chest pain.  3. Back pain.  4. Signs of infection such as: chills or fever occurring within 24 hours   after the procedure.  5. Rectal bleeding, which would show as bright red, maroon, or black stools.   (A tablespoon of blood from the rectum is not serious, especially if    hemorrhoids are present.)  6. Vomiting.  7. Weakness or dizziness.  GO DIRECTLY TO THE NEAREST EMERGENCY ROOM IF YOU HAVE ANY OF THE FOLLOWING:      Difficulty breathing              Chills and/or fever over 101 F   Persistent vomiting and/or vomiting blood   Severe abdominal pain   Severe chest pain   Black, tarry stools   Bleeding- more than one tablespoon   Any other symptom or condition that you feel may need urgent attention  Your doctor recommends these additional instructions:  If any biopsies were taken, your doctors clinic will contact you in 1 to 2   weeks with any results.  - Patient has a contact number available for emergencies.  The signs and   symptoms of potential delayed complications were discussed with the   patient.  Return to normal activities tomorrow.  Written discharge   instructions were provided to the patient.   - Discharge patient to home.   - Resume previous diet.   - Continue present medications.   - Await pathology results.   - Repeat colonoscopy in 5 years for surveillance.   For questions, problems or results please call your physician - Sukumar Loya MD at Work:  [phone].  [facility], EMERGENCY ROOM PHONE NUMBER: [phone]  IF A COMPLICATION OR EMERGENCY SITUATION ARISES AND YOU ARE UNABLE TO REACH   YOUR PHYSICIAN - GO DIRECTLY TO THE EMERGENCY ROOM.  Sukumar Loya MD  8/12/2024 3:08:08 PM  This report has been verified and signed electronically.  Dear patient,  As a result of recent federal legislation (The Federal Cures Act), you may   receive lab or pathology results from your procedure in your [facility]   account before your physician is able to contact you. Your physician or   their representative will relay the results to you with their   recommendations at their soonest availability.  Thank you,  PROVATION

## 2024-08-13 NOTE — ANESTHESIA POSTPROCEDURE EVALUATION
Anesthesia Post Evaluation    Patient: Mari Ambrocio    Procedure(s) Performed: Procedure(s) (LRB):  COLONOSCOPY (N/A)    Final Anesthesia Type: general      Patient location during evaluation: PACU  Patient participation: Yes- Able to Participate  Level of consciousness: awake and alert  Post-procedure vital signs: reviewed and stable  Pain management: adequate  Airway patency: patent    PONV status at discharge: No PONV  Anesthetic complications: no      Cardiovascular status: stable  Respiratory status: spontaneous ventilation  Hydration status: euvolemic  Follow-up not needed.          Vitals Value Taken Time   /72 08/12/24 1524   Temp 37.1 °C (98.8 °F) 08/12/24 1505   Pulse 68 08/12/24 1524   Resp 16 08/12/24 1524   SpO2 99 % 08/12/24 1524         No case tracking events are documented in the log.      Pain/Kristina Score: Kristina Score: 10 (8/12/2024  3:20 PM)

## 2024-08-14 ENCOUNTER — TELEPHONE (OUTPATIENT)
Dept: GASTROENTEROLOGY | Facility: CLINIC | Age: 64
End: 2024-08-14
Payer: MEDICARE

## 2024-08-14 ENCOUNTER — PATIENT MESSAGE (OUTPATIENT)
Dept: GASTROENTEROLOGY | Facility: CLINIC | Age: 64
End: 2024-08-14
Payer: MEDICARE

## 2024-08-14 LAB
FINAL PATHOLOGIC DIAGNOSIS: NORMAL
GROSS: NORMAL
Lab: NORMAL

## 2024-08-14 NOTE — TELEPHONE ENCOUNTER
----- Message from Sukumar Loya MD sent at 8/14/2024  1:22 PM CDT -----  Please call and notify patient, the colon polyps were benign.

## 2024-08-21 ENCOUNTER — LAB VISIT (OUTPATIENT)
Dept: LAB | Facility: HOSPITAL | Age: 64
End: 2024-08-21
Attending: STUDENT IN AN ORGANIZED HEALTH CARE EDUCATION/TRAINING PROGRAM
Payer: MEDICARE

## 2024-08-21 ENCOUNTER — PATIENT MESSAGE (OUTPATIENT)
Dept: FAMILY MEDICINE | Facility: CLINIC | Age: 64
End: 2024-08-21
Payer: MEDICARE

## 2024-08-21 ENCOUNTER — PATIENT MESSAGE (OUTPATIENT)
Dept: GASTROENTEROLOGY | Facility: CLINIC | Age: 64
End: 2024-08-21
Payer: MEDICARE

## 2024-08-21 DIAGNOSIS — Z00.00 ANNUAL PHYSICAL EXAM: ICD-10-CM

## 2024-08-21 DIAGNOSIS — R79.9 ABNORMAL FINDING OF BLOOD CHEMISTRY, UNSPECIFIED: ICD-10-CM

## 2024-08-21 DIAGNOSIS — R68.89 OTHER GENERAL SYMPTOMS AND SIGNS: ICD-10-CM

## 2024-08-21 LAB
ALBUMIN SERPL BCP-MCNC: 3.7 G/DL (ref 3.5–5.2)
ALP SERPL-CCNC: 86 U/L (ref 55–135)
ALT SERPL W/O P-5'-P-CCNC: 12 U/L (ref 10–44)
ANION GAP SERPL CALC-SCNC: 7 MMOL/L (ref 8–16)
AST SERPL-CCNC: 16 U/L (ref 10–40)
BASOPHILS # BLD AUTO: 0.05 K/UL (ref 0–0.2)
BASOPHILS NFR BLD: 0.8 % (ref 0–1.9)
BILIRUB SERPL-MCNC: 0.5 MG/DL (ref 0.1–1)
BUN SERPL-MCNC: 13 MG/DL (ref 8–23)
CALCIUM SERPL-MCNC: 9.5 MG/DL (ref 8.7–10.5)
CHLORIDE SERPL-SCNC: 109 MMOL/L (ref 95–110)
CHOLEST SERPL-MCNC: 167 MG/DL (ref 120–199)
CHOLEST/HDLC SERPL: 3.7 {RATIO} (ref 2–5)
CO2 SERPL-SCNC: 26 MMOL/L (ref 23–29)
CREAT SERPL-MCNC: 0.8 MG/DL (ref 0.5–1.4)
DIFFERENTIAL METHOD BLD: NORMAL
EOSINOPHIL # BLD AUTO: 0.3 K/UL (ref 0–0.5)
EOSINOPHIL NFR BLD: 3.8 % (ref 0–8)
ERYTHROCYTE [DISTWIDTH] IN BLOOD BY AUTOMATED COUNT: 13 % (ref 11.5–14.5)
EST. GFR  (NO RACE VARIABLE): >60 ML/MIN/1.73 M^2
GLUCOSE SERPL-MCNC: 93 MG/DL (ref 70–110)
HCT VFR BLD AUTO: 44.4 % (ref 37–48.5)
HDLC SERPL-MCNC: 45 MG/DL (ref 40–75)
HDLC SERPL: 26.9 % (ref 20–50)
HGB BLD-MCNC: 14.2 G/DL (ref 12–16)
IMM GRANULOCYTES # BLD AUTO: 0.03 K/UL (ref 0–0.04)
IMM GRANULOCYTES NFR BLD AUTO: 0.5 % (ref 0–0.5)
LDLC SERPL CALC-MCNC: 98.4 MG/DL (ref 63–159)
LYMPHOCYTES # BLD AUTO: 1.7 K/UL (ref 1–4.8)
LYMPHOCYTES NFR BLD: 26.3 % (ref 18–48)
MCH RBC QN AUTO: 29 PG (ref 27–31)
MCHC RBC AUTO-ENTMCNC: 32 G/DL (ref 32–36)
MCV RBC AUTO: 91 FL (ref 82–98)
MONOCYTES # BLD AUTO: 0.5 K/UL (ref 0.3–1)
MONOCYTES NFR BLD: 8.2 % (ref 4–15)
NEUTROPHILS # BLD AUTO: 4 K/UL (ref 1.8–7.7)
NEUTROPHILS NFR BLD: 60.4 % (ref 38–73)
NONHDLC SERPL-MCNC: 122 MG/DL
NRBC BLD-RTO: 0 /100 WBC
PLATELET # BLD AUTO: 230 K/UL (ref 150–450)
PMV BLD AUTO: 12.4 FL (ref 9.2–12.9)
POTASSIUM SERPL-SCNC: 4.6 MMOL/L (ref 3.5–5.1)
PROT SERPL-MCNC: 6.6 G/DL (ref 6–8.4)
RBC # BLD AUTO: 4.89 M/UL (ref 4–5.4)
SODIUM SERPL-SCNC: 142 MMOL/L (ref 136–145)
TRIGL SERPL-MCNC: 118 MG/DL (ref 30–150)
TSH SERPL DL<=0.005 MIU/L-ACNC: 1.6 UIU/ML (ref 0.4–4)
WBC # BLD AUTO: 6.55 K/UL (ref 3.9–12.7)

## 2024-08-21 PROCEDURE — 85025 COMPLETE CBC W/AUTO DIFF WBC: CPT | Performed by: STUDENT IN AN ORGANIZED HEALTH CARE EDUCATION/TRAINING PROGRAM

## 2024-08-21 PROCEDURE — 80061 LIPID PANEL: CPT | Performed by: STUDENT IN AN ORGANIZED HEALTH CARE EDUCATION/TRAINING PROGRAM

## 2024-08-21 PROCEDURE — 36415 COLL VENOUS BLD VENIPUNCTURE: CPT | Mod: PO | Performed by: STUDENT IN AN ORGANIZED HEALTH CARE EDUCATION/TRAINING PROGRAM

## 2024-08-21 PROCEDURE — 84443 ASSAY THYROID STIM HORMONE: CPT | Performed by: STUDENT IN AN ORGANIZED HEALTH CARE EDUCATION/TRAINING PROGRAM

## 2024-08-21 PROCEDURE — 80053 COMPREHEN METABOLIC PANEL: CPT | Performed by: STUDENT IN AN ORGANIZED HEALTH CARE EDUCATION/TRAINING PROGRAM

## 2024-08-26 ENCOUNTER — OFFICE VISIT (OUTPATIENT)
Dept: FAMILY MEDICINE | Facility: CLINIC | Age: 64
End: 2024-08-26
Payer: MEDICARE

## 2024-08-26 VITALS
DIASTOLIC BLOOD PRESSURE: 90 MMHG | WEIGHT: 181.19 LBS | SYSTOLIC BLOOD PRESSURE: 132 MMHG | OXYGEN SATURATION: 97 % | HEART RATE: 67 BPM | HEIGHT: 65 IN | BODY MASS INDEX: 30.19 KG/M2

## 2024-08-26 VITALS
WEIGHT: 180.75 LBS | DIASTOLIC BLOOD PRESSURE: 72 MMHG | OXYGEN SATURATION: 98 % | BODY MASS INDEX: 30.12 KG/M2 | HEART RATE: 71 BPM | SYSTOLIC BLOOD PRESSURE: 112 MMHG | HEIGHT: 65 IN

## 2024-08-26 DIAGNOSIS — Z00.00 ENCOUNTER FOR PREVENTIVE HEALTH EXAMINATION: Primary | ICD-10-CM

## 2024-08-26 DIAGNOSIS — M85.80 OSTEOPENIA, UNSPECIFIED LOCATION: ICD-10-CM

## 2024-08-26 DIAGNOSIS — Z01.419 WELL WOMAN EXAM WITH ROUTINE GYNECOLOGICAL EXAM: Primary | ICD-10-CM

## 2024-08-26 DIAGNOSIS — Z86.59 HISTORY OF ANXIETY: ICD-10-CM

## 2024-08-26 DIAGNOSIS — H91.90 HEARING LOSS, UNSPECIFIED HEARING LOSS TYPE, UNSPECIFIED LATERALITY: ICD-10-CM

## 2024-08-26 DIAGNOSIS — N95.2 VAGINAL ATROPHY: ICD-10-CM

## 2024-08-26 DIAGNOSIS — Z12.83 SKIN EXAM, SCREENING FOR CANCER: ICD-10-CM

## 2024-08-26 PROBLEM — Z12.11 SCREENING FOR COLON CANCER: Status: RESOLVED | Noted: 2018-04-11 | Resolved: 2024-08-26

## 2024-08-26 PROCEDURE — 1160F RVW MEDS BY RX/DR IN RCRD: CPT | Mod: CPTII,S$GLB,, | Performed by: NURSE PRACTITIONER

## 2024-08-26 PROCEDURE — 3288F FALL RISK ASSESSMENT DOCD: CPT | Mod: CPTII,S$GLB,, | Performed by: NURSE PRACTITIONER

## 2024-08-26 PROCEDURE — 3288F FALL RISK ASSESSMENT DOCD: CPT | Mod: CPTII,S$GLB,, | Performed by: STUDENT IN AN ORGANIZED HEALTH CARE EDUCATION/TRAINING PROGRAM

## 2024-08-26 PROCEDURE — 99999 PR PBB SHADOW E&M-EST. PATIENT-LVL V: CPT | Mod: PBBFAC,,, | Performed by: NURSE PRACTITIONER

## 2024-08-26 PROCEDURE — 1159F MED LIST DOCD IN RCRD: CPT | Mod: CPTII,S$GLB,, | Performed by: NURSE PRACTITIONER

## 2024-08-26 PROCEDURE — 99397 PER PM REEVAL EST PAT 65+ YR: CPT | Mod: S$GLB,,, | Performed by: STUDENT IN AN ORGANIZED HEALTH CARE EDUCATION/TRAINING PROGRAM

## 2024-08-26 PROCEDURE — 1101F PT FALLS ASSESS-DOCD LE1/YR: CPT | Mod: CPTII,S$GLB,, | Performed by: NURSE PRACTITIONER

## 2024-08-26 PROCEDURE — 3074F SYST BP LT 130 MM HG: CPT | Mod: CPTII,S$GLB,, | Performed by: NURSE PRACTITIONER

## 2024-08-26 PROCEDURE — 1160F RVW MEDS BY RX/DR IN RCRD: CPT | Mod: CPTII,S$GLB,, | Performed by: STUDENT IN AN ORGANIZED HEALTH CARE EDUCATION/TRAINING PROGRAM

## 2024-08-26 PROCEDURE — 3078F DIAST BP <80 MM HG: CPT | Mod: CPTII,S$GLB,, | Performed by: NURSE PRACTITIONER

## 2024-08-26 PROCEDURE — 1123F ACP DISCUSS/DSCN MKR DOCD: CPT | Mod: CPTII,S$GLB,, | Performed by: STUDENT IN AN ORGANIZED HEALTH CARE EDUCATION/TRAINING PROGRAM

## 2024-08-26 PROCEDURE — 99999 PR PBB SHADOW E&M-EST. PATIENT-LVL III: CPT | Mod: PBBFAC,,, | Performed by: STUDENT IN AN ORGANIZED HEALTH CARE EDUCATION/TRAINING PROGRAM

## 2024-08-26 PROCEDURE — 1123F ACP DISCUSS/DSCN MKR DOCD: CPT | Mod: CPTII,S$GLB,, | Performed by: NURSE PRACTITIONER

## 2024-08-26 PROCEDURE — 3080F DIAST BP >= 90 MM HG: CPT | Mod: CPTII,S$GLB,, | Performed by: STUDENT IN AN ORGANIZED HEALTH CARE EDUCATION/TRAINING PROGRAM

## 2024-08-26 PROCEDURE — 1101F PT FALLS ASSESS-DOCD LE1/YR: CPT | Mod: CPTII,S$GLB,, | Performed by: STUDENT IN AN ORGANIZED HEALTH CARE EDUCATION/TRAINING PROGRAM

## 2024-08-26 PROCEDURE — 3008F BODY MASS INDEX DOCD: CPT | Mod: CPTII,S$GLB,, | Performed by: STUDENT IN AN ORGANIZED HEALTH CARE EDUCATION/TRAINING PROGRAM

## 2024-08-26 PROCEDURE — G0402 INITIAL PREVENTIVE EXAM: HCPCS | Mod: S$GLB,,, | Performed by: NURSE PRACTITIONER

## 2024-08-26 PROCEDURE — 3075F SYST BP GE 130 - 139MM HG: CPT | Mod: CPTII,S$GLB,, | Performed by: STUDENT IN AN ORGANIZED HEALTH CARE EDUCATION/TRAINING PROGRAM

## 2024-08-26 PROCEDURE — 1159F MED LIST DOCD IN RCRD: CPT | Mod: CPTII,S$GLB,, | Performed by: STUDENT IN AN ORGANIZED HEALTH CARE EDUCATION/TRAINING PROGRAM

## 2024-08-26 RX ORDER — CHOLECALCIFEROL (VITAMIN D3) 125 MCG
CAPSULE ORAL
COMMUNITY

## 2024-08-26 RX ORDER — CALCIUM CARBONATE 600 MG
600 TABLET ORAL 2 TIMES DAILY WITH MEALS
COMMUNITY

## 2024-08-26 RX ORDER — VIT C/E/ZN/COPPR/LUTEIN/ZEAXAN 250MG-90MG
1000 CAPSULE ORAL DAILY
COMMUNITY

## 2024-08-26 RX ORDER — CONJUGATED ESTROGENS 0.62 MG/G
0.5 CREAM VAGINAL
Qty: 30 G | Refills: 3 | Status: SHIPPED | OUTPATIENT
Start: 2024-08-26 | End: 2025-08-26

## 2024-08-26 RX ORDER — ASCORBIC ACID 250 MG
250 TABLET ORAL DAILY
COMMUNITY

## 2024-08-26 NOTE — PATIENT INSTRUCTIONS
Counseling and Referral of Other Preventative  (Italic type indicates deductible and co-insurance are waived)    Patient Name: Mari Ambrocio  Today's Date: 8/26/2024    Health Maintenance       Date Due Completion Date    Shingles Vaccine (1 of 2) Never done ---    RSV Vaccine (Age 60+ and Pregnant patients) (1 - 1-dose 60+ series) Never done ---    COVID-19 Vaccine (6 - 2023-24 season) 01/23/2024 9/23/2023    Pneumococcal Vaccines (Age 65+) (1 of 1 - PCV) Never done ---    Hemoglobin A1c (Diabetic Prevention Screening) 07/07/2024 7/7/2021    Influenza Vaccine (1) 09/01/2024 9/26/2023    Mammogram 02/28/2025 2/28/2024    DEXA Scan 07/12/2026 7/12/2024    TETANUS VACCINE 01/03/2027 1/3/2017    Colorectal Cancer Screening 08/12/2029 8/12/2024    Lipid Panel 08/21/2029 8/21/2024        No orders of the defined types were placed in this encounter.    The following information is provided to all patients.  This information is to help you find resources for any of the problems found today that may be affecting your health:                  Living healthy guide: www.[facility].gov      Understanding Diabetes: www.diabetes.org      Eating healthy: www.cdc.gov/healthyweight      [facility] home safety checklist: www.cdc.gov/steadi/patient.html      Agency on Aging: www.goea.[place].[facility]      Alcoholics anonymous (AA): www.aa.org      Physical Activity: www.bobby.nih.gov/cv7inoa      Tobacco use: www.quitwithusla.org

## 2024-08-26 NOTE — PROGRESS NOTES
History & Physical  [facility]      SUBJECTIVE:     History of Present Illness:  Patient is a 64 y.o. female presents to clinic to establish care. Current diagnoses include uterovaginal prolapse s/p repair.    Doing well with premarin cream twice weekly.     Last pap smear- s/p hysterectomy  Last mammogram- 2024  Last colonoscopy- 2024  Last HgbA1C- 5.4% in 2021  Last lipid panel- LDL 98.4 in 2024  DEXA Scan- osteopenia 2024    Smoker- former    Review of patient's allergies indicates:   Allergen Reactions    Bananas [banana]     Cantaloupe     Cucumbers [cucumber fruit extract]     Hinckley     Watermelon        Past Medical History:   Diagnosis Date    Ganglion cyst     History of shingles     Hyperlipidemia     Kidney stone     Otitis media     PONV (postoperative nausea and vomiting)     Sciatica     Urinary tract infection      Past Surgical History:   Procedure Laterality Date    CATARACT EXTRACTION W/ INTRAOCULAR LENS IMPLANT Bilateral 07/09/2024 7/9/24 right eye; 7/23/[facility]    COLONOSCOPY N/A 04/11/2018    Procedure: COLONOSCOPY Golytely;  Surgeon: Deya Harrington MD;  Location: [facility] ENDO;  Service: Endoscopy;  Laterality: N/A;    COLONOSCOPY N/A 08/12/2024    Procedure: COLONOSCOPY;  Surgeon: Sukumar Loya MD;  Location: [facility] ENDOSCOPY;  Service: Endoscopy;  Laterality: N/A;  Hill/PEG/portal/ had clearance from cataract surgery faxed to [facility]  8-9 pre call completed [facility]    CYSTOSCOPY N/A 07/06/2020    Procedure: CYSTOSCOPY;  Surgeon: Corey Riley MD;  Location: [facility] OR;  Service: OB/GYN;  Laterality: N/A;    GANGLION CYST EXCISION      HYSTERECTOMY      INSERTION OF MIDURETHRAL SLING N/A 07/06/2020    Procedure: SLING, MIDURETHRAL;  Surgeon: Corey Riley MD;  Location: [facility] OR;  Service: OB/GYN;  Laterality: N/A;    LAPAROSCOPIC SALPINGO-OOPHORECTOMY N/A 07/06/2020    Procedure: SALPINGO-OOPHORECTOMY,  "LAPAROSCOPIC;  Surgeon: Corey Riley MD;  Location: [facility];  Service: OB/GYN;  Laterality: N/A;    OOPHORECTOMY      ROBOT-ASSISTED LAPAROSCOPIC ABDOMINAL SACROCOLPOPEXY N/A 2020    Procedure: ROBOTIC SACROCOLPOPEXY, ABDOMEN;  Surgeon: Corey Riley MD;  Location: [facility] OR;  Service: OB/GYN;  Laterality: N/A;    ROBOT-ASSISTED LAPAROSCOPIC HYSTERECTOMY N/A 2020    Procedure: ROBOTIC HYSTERECTOMY;  Surgeon: Corey Riley MD;  Location: [facility] OR;  Service: OB/GYN;  Laterality: N/A;    TUBAL LIGATION      urethra widened       Family History   Problem Relation Name Age of Onset    Dementia Mother      Depression Father Luis F Kinney, 58             Alcohol abuse Father Luis F Kinney, 58             Suicide Father Luis F Kinney, 58     No Known Problems Sister 1     Hyperlipidemia Brother 1     Heart attack Brother 1     Fibromyalgia Daughter 3     Cancer Maternal Grandmother Grandma Nelly         breast    Breast cancer Maternal Grandmother Grandma Nelly     Kidney disease Neg Hx      Thyroid disease Neg Hx       Social History     Tobacco Use    Smoking status: Former     Current packs/day: 0.00     Average packs/day: 0.9 packs/day for 18.8 years (17.6 ttl pk-yrs)     Types: Cigarettes     Start date:      Quit date: 10/24/2008     Years since quitting: 15.8    Smokeless tobacco: Never   Substance Use Topics    Alcohol use: No    Drug use: Not Currently     Types: Marijuana        OBJECTIVE:     Vital Signs (Most Recent)  Vitals:    24 1013   BP: (!) 132/90   BP Location: Left arm   Patient Position: Sitting   BP Method: Medium (Manual)   Pulse: 67   SpO2: 97%   Weight: 82.2 kg (181 lb 3.5 oz)   Height: 5' 5" (1.651 m)     BMI: 30.16    Physical Exam:  Gen: Well nourished and developed, NAD, appears stated age  CV: RRR, S1 and S2 present, no murmurs, no LE edema, radial and dorsalis pedis pulses equal and present bilaterally  Resp: breathing comfortably on room air, CTAB, no " wheezes or crackles  Abd: Soft, non-distended, non-tender  Skin: No rashes or abnormal skin lesions, no apparent jaundice or bruising  Neuro: A&Ox4, spontaneous movements, gait smooth aguila wnl  Psych: Logical thought process, answers questions appropriately  Breast: Bilateral breasts without any abnormal skin changes or nipple discharge. Palpation of breasts without tenderness or masses. No axillary or cervical adenopathy.   GYN: External genitalia wnl, no apparent masses or lesions, bimanual examination performed and tolerated by the patient. Speculum examination performed and tolerated by the patient. No abnormal vaginal discharge appreciated, tissue well moisturized.      Breast and pelvic examination performed by Carlos Moody DO in the presence of a female chaperone.         ASSESSMENT/PLAN:   64 y.o.female presents to clinic to establish care/annual exam    1. Well woman exam with routine gynecological exam  UTD on HM. Doing well     2. Vaginal atrophy  Well controlled on current regimen.    - conjugated estrogens (PREMARIN) vaginal cream; Place 0.5 g vaginally twice a week. Patient has not been seen by this clinic in over 1 year. Needs appointment for refills.  Dispense: 30 g; Refill: 3     Follow-up: 1 year for annual or sooner PRN      Carlos Moody DO  Family Medicine

## 2024-08-26 NOTE — PROGRESS NOTES
"  Mari Ambrocio presented for a  Medicare AWV and comprehensive Health Risk Assessment today. The following components were reviewed and updated:    Medical history  Family History  Social history  Allergies and Current Medications  Health Risk Assessment  Health Maintenance  Care Team         ** See Completed Assessments for Annual Wellness Visit within the encounter summary.**         The following assessments were completed:  Living Situation  CAGE  Depression Screening  Timed Get Up and Go  Whisper Test  Cognitive Function Screening    Nutrition Screening  ADL Screening  PAQ Screening      Opioid documentation for eAWV      Patient does not have a current opioid prescription.        Review for Substance Use Disorders: Patient does not use substance        Current Outpatient Medications:     ascorbic acid, vitamin C, (VITAMIN C) 250 MG tablet, Take 250 mg by mouth once daily., Disp: , Rfl:     biotin 5,000 mcg TbDL, Take by mouth., Disp: , Rfl:     calcium carbonate (CALCIUM 600) 600 mg calcium (1,500 mg) Tab, Take 600 mg by mouth 2 (two) times daily with meals., Disp: , Rfl:     cholecalciferol, vitamin D3, (VITAMIN D3) 25 mcg (1,000 unit) capsule, Take 1,000 Units by mouth once daily., Disp: , Rfl:     conjugated estrogens (PREMARIN) vaginal cream, Place 1 g vaginally twice a week. Patient has not been seen by this clinic in over 1 year. Needs appointment for refills., Disp: 1 applicator, Rfl: 0       Vitals:    08/26/24 0942   BP: 112/72   Pulse: 71   SpO2: 98%   Weight: 82 kg (180 lb 12.4 oz)   Height: 5' 5" (1.651 m)   PainSc: 0-No pain      Physical Exam  Vitals reviewed.   Constitutional:       General: She is not in acute distress.     Appearance: Normal appearance. She is not ill-appearing.   HENT:      Head: Normocephalic and atraumatic.      Mouth/Throat:      Mouth: Mucous membranes are moist.   Eyes:      General: No scleral icterus.        Right eye: No discharge.         Left eye: No discharge.      " Extraocular Movements: Extraocular movements intact.      Conjunctiva/sclera: Conjunctivae normal.   Cardiovascular:      Rate and Rhythm: Normal rate.   Pulmonary:      Effort: Pulmonary effort is normal. No respiratory distress.   Musculoskeletal:      Cervical back: Normal range of motion.   Skin:     General: Skin is warm and dry.   Neurological:      Mental Status: She is alert and oriented to person, place, and time.   Psychiatric:         Mood and Affect: Mood normal.         Behavior: Behavior normal. Behavior is cooperative.         Cognition and Memory: Cognition and memory normal.               Diagnoses and health risks identified today and associated recommendations/orders:    1. Encounter for preventive health examination  - Chart reviewed. Problem list updated. Discussed current medical diagnosis, current medications, medical/surgical/family/social history; updated provider list; documented vital signs; identified any cognitive impairment; and updated risk factor list. Addressed any outstanding health maintenance. Provided patient with personalized health advice. Continue to follow up with PCP and any specialists.     2. Osteopenia, unspecified location  Chronic; stable on current treatment plan; follow up with PCP  - taking calcium and vit d supplements     3. Hearing loss, unspecified hearing loss type, unspecified laterality  - hearing loss reported, referral placed   - Ambulatory referral/consult to ENT; Future  - Ambulatory referral/consult to Audiology; Future    4. Skin exam, screening for cancer  - requesting routine skin exam for skin cancer screening; referral placed  - Ambulatory referral/consult to Dermatology; Future    5. Vaginal atrophy  Chronic; stable on current treatment plan; follow up with PCP  - taking premarin    6. History of anxiety  - hx anxiety, no anxiety reported today, no medication for anxiety     7. BMI 30.0-30.9,adult  - Recommendation for healthy diet and increasing  exercise as tolerated with goal of 150min/week . Recommend weight loss        Provided Mari with a 5-10 year written screening schedule and personal prevention plan. Recommendations were developed using the USPSTF age appropriate recommendations. Education, counseling, and referrals were provided as needed. After Visit Summary printed and given to patient which includes a list of additional screenings\tests needed.    Follow up in about 1 year (around 8/26/2025) for your next medicare wellness visit.    Yolanda Dhaliwal, FNP-C    Advance Care Planning I offered to discuss advanced care planning, including how to pick a person who would make decisions for you if you were unable to make them for yourself, called a health care power of , and what kind of decisions you might make such as use of life sustaining treatments such as ventilators and tube feeding when faced with a life limiting illness recorded on a living will that they will need to know. (How you want to be cared for as you near the end of your natural life)     X  Patient has advanced directives on file, which we reviewed, and they do not wish to make changes.

## 2024-10-07 ENCOUNTER — CLINICAL SUPPORT (OUTPATIENT)
Dept: OTOLARYNGOLOGY | Facility: CLINIC | Age: 64
End: 2024-10-07
Payer: MEDICARE

## 2024-10-07 ENCOUNTER — OFFICE VISIT (OUTPATIENT)
Dept: OTOLARYNGOLOGY | Facility: CLINIC | Age: 64
End: 2024-10-07
Payer: MEDICARE

## 2024-10-07 VITALS
WEIGHT: 182.44 LBS | DIASTOLIC BLOOD PRESSURE: 90 MMHG | HEART RATE: 70 BPM | BODY MASS INDEX: 30.36 KG/M2 | SYSTOLIC BLOOD PRESSURE: 139 MMHG

## 2024-10-07 DIAGNOSIS — H90.3 SENSORINEURAL HEARING LOSS (SNHL) OF BOTH EARS: Primary | Chronic | ICD-10-CM

## 2024-10-07 DIAGNOSIS — H90.3 SENSORINEURAL HEARING LOSS (SNHL) OF BOTH EARS: ICD-10-CM

## 2024-10-07 DIAGNOSIS — J31.0 CHRONIC RHINITIS: Chronic | ICD-10-CM

## 2024-10-07 DIAGNOSIS — J34.2 NASAL SEPTAL DEVIATION: Chronic | ICD-10-CM

## 2024-10-07 PROCEDURE — 99214 OFFICE O/P EST MOD 30 MIN: CPT | Mod: S$GLB,,, | Performed by: OTOLARYNGOLOGY

## 2024-10-07 PROCEDURE — 92557 COMPREHENSIVE HEARING TEST: CPT | Mod: S$GLB,,, | Performed by: AUDIOLOGIST

## 2024-10-07 PROCEDURE — 1157F ADVNC CARE PLAN IN RCRD: CPT | Mod: CPTII,S$GLB,, | Performed by: OTOLARYNGOLOGY

## 2024-10-07 PROCEDURE — 3075F SYST BP GE 130 - 139MM HG: CPT | Mod: CPTII,S$GLB,, | Performed by: OTOLARYNGOLOGY

## 2024-10-07 PROCEDURE — 1160F RVW MEDS BY RX/DR IN RCRD: CPT | Mod: CPTII,S$GLB,, | Performed by: OTOLARYNGOLOGY

## 2024-10-07 PROCEDURE — 1159F MED LIST DOCD IN RCRD: CPT | Mod: CPTII,S$GLB,, | Performed by: OTOLARYNGOLOGY

## 2024-10-07 PROCEDURE — 1101F PT FALLS ASSESS-DOCD LE1/YR: CPT | Mod: CPTII,S$GLB,, | Performed by: OTOLARYNGOLOGY

## 2024-10-07 PROCEDURE — 3288F FALL RISK ASSESSMENT DOCD: CPT | Mod: CPTII,S$GLB,, | Performed by: OTOLARYNGOLOGY

## 2024-10-07 PROCEDURE — 99999 PR PBB SHADOW E&M-EST. PATIENT-LVL I: CPT | Mod: PBBFAC,,, | Performed by: AUDIOLOGIST

## 2024-10-07 PROCEDURE — 3008F BODY MASS INDEX DOCD: CPT | Mod: CPTII,S$GLB,, | Performed by: OTOLARYNGOLOGY

## 2024-10-07 PROCEDURE — 3080F DIAST BP >= 90 MM HG: CPT | Mod: CPTII,S$GLB,, | Performed by: OTOLARYNGOLOGY

## 2024-10-07 PROCEDURE — 99999 PR PBB SHADOW E&M-EST. PATIENT-LVL III: CPT | Mod: PBBFAC,,, | Performed by: OTOLARYNGOLOGY

## 2024-10-07 PROCEDURE — 92567 TYMPANOMETRY: CPT | Mod: S$GLB,,, | Performed by: AUDIOLOGIST

## 2024-10-07 RX ORDER — AZELASTINE 1 MG/ML
1 SPRAY, METERED NASAL 2 TIMES DAILY
Qty: 30 ML | Refills: 3 | Status: SHIPPED | OUTPATIENT
Start: 2024-10-07 | End: 2025-10-07

## 2024-10-07 NOTE — PROGRESS NOTES
Mari Ambrocio was seen today in the clinic for an audiologic evaluation.  Her main complaint was difficulty hearing in background noise.    Tympanometry revealed a Type A tympanogram for both ears.  Audiogram results revealed a mild sloping to severe sensorineural hearing loss bilaterally.  Speech reception thresholds were obtained at 20dB for both ears and speech discrimination scores were 84% for the right ear and 72% for the left ear.    Recommendations:  Otologic evaluation  Annual evaluation  Hearing aid consult  Hearing protection in noise

## 2024-10-07 NOTE — PROGRESS NOTES
Chief Complaint   Patient presents with    Hearing Loss     Hearing loss in both ears over a few years   .     HPI:  Mari Ambrocio is a very pleasant 64 y.o. female here to see me today for the first time for evaluation of hearing loss.  She reports hearing loss that has been gradually progressing over the last few years.  She has not noted any difference in hearing between the ears, with both  ear being the worse hearing ear.  She has not noted any tinnitus in either ear.  She has not had any recent issues with ear pain or ear drainage.  She  has not had any previous otologic surgery.  She admits to any history of significant loud noise exposure. History of TMJ arthralgia but his has been doing better as of recently. She has severe bout in March of this year.         Past Medical History:   Diagnosis Date    Ganglion cyst     History of shingles     Hyperlipidemia     Kidney stone     Otitis media     PONV (postoperative nausea and vomiting)     Sciatica     Urinary tract infection      Social History     Socioeconomic History    Marital status:    Tobacco Use    Smoking status: Former     Current packs/day: 0.00     Average packs/day: 0.9 packs/day for 18.8 years (17.6 ttl pk-yrs)     Types: Cigarettes     Start date: 1978     Quit date: 10/24/2008     Years since quitting: 15.9    Smokeless tobacco: Never   Substance and Sexual Activity    Alcohol use: No    Drug use: Not Currently     Types: Marijuana    Sexual activity: Yes     Partners: Male     Birth control/protection: Post-menopausal, See Surgical Hx     Social Drivers of Health     Financial Resource Strain: Low Risk  (8/26/2024)    Overall Financial Resource Strain (CARDIA)     Difficulty of Paying Living Expenses: Not hard at all   Food Insecurity: No Food Insecurity (8/26/2024)    Hunger Vital Sign     Worried About Running Out of Food in the Last Year: Never true     Ran Out of Food in the Last Year: Never true   Recent Concern: Food  Insecurity - Food Insecurity Present (6/24/2024)    Hunger Vital Sign     Worried About Running Out of Food in the Last Year: Sometimes true     Ran Out of Food in the Last Year: Patient declined   Transportation Needs: No Transportation Needs (8/26/2024)    PRAPARE - Transportation     Lack of Transportation (Medical): No     Lack of Transportation (Non-Medical): No   Physical Activity: Sufficiently Active (8/26/2024)    Exercise Vital Sign     Days of Exercise per Week: 7 days     Minutes of Exercise per Session: 120 min   Stress: No Stress Concern Present (8/26/2024)    [employer] - Occupational Stress Questionnaire     Feeling of Stress : Only a little   Housing Stability: Low Risk  (8/26/2024)    Housing Stability Vital Sign     Unable to Pay for Housing in the Last Year: No     Homeless in the Last Year: No     Past Surgical History:   Procedure Laterality Date    CATARACT EXTRACTION W/ INTRAOCULAR LENS IMPLANT Bilateral 07/09/2024 7/9/24 right eye; [facility]    COLONOSCOPY N/A 04/11/2018    Procedure: COLONOSCOPY Golytely;  Surgeon: Deya Harrington MD;  Location: [facility] ENDO;  Service: Endoscopy;  Laterality: N/A;    COLONOSCOPY N/A 08/12/2024    Procedure: COLONOSCOPY;  Surgeon: Sukumar Loya MD;  Location: [facility] ENDOSCOPY;  Service: Endoscopy;  Laterality: N/A;  Hill/PEG/portal/ had clearance from cataract surgery faxed to [facility]  8-9 pre call completed [facility]    CYSTOSCOPY N/A 07/06/2020    Procedure: CYSTOSCOPY;  Surgeon: Corey Riley MD;  Location: [facility] OR;  Service: OB/GYN;  Laterality: N/A;    GANGLION CYST EXCISION      HYSTERECTOMY      INSERTION OF MIDURETHRAL SLING N/A 07/06/2020    Procedure: SLING, MIDURETHRAL;  Surgeon: Corey Riley MD;  Location: [facility] OR;  Service: OB/GYN;  Laterality: N/A;    LAPAROSCOPIC SALPINGO-OOPHORECTOMY N/A 07/06/2020    Procedure: SALPINGO-OOPHORECTOMY, LAPAROSCOPIC;  Surgeon: Corey  MORENO Riley MD;  Location: [facility] OR;  Service: OB/GYN;  Laterality: N/A;    OOPHORECTOMY      ROBOT-ASSISTED LAPAROSCOPIC ABDOMINAL SACROCOLPOPEXY N/A 2020    Procedure: ROBOTIC SACROCOLPOPEXY, ABDOMEN;  Surgeon: Corey Riley MD;  Location: [facility] OR;  Service: OB/GYN;  Laterality: N/A;    ROBOT-ASSISTED LAPAROSCOPIC HYSTERECTOMY N/A 2020    Procedure: ROBOTIC HYSTERECTOMY;  Surgeon: Corey Riley MD;  Location: [facility] OR;  Service: OB/GYN;  Laterality: N/A;    TUBAL LIGATION      urethra widened       Family History   Problem Relation Name Age of Onset    Dementia Mother      Depression Father Luis F Kinney, 58             Alcohol abuse Father Luis F Kinney, 58             Suicide Father Luis F Kinney, 58     No Known Problems Sister 1     Hyperlipidemia Brother 1     Heart attack Brother 1     Fibromyalgia Daughter 3     Cancer Maternal Grandmother Grandma Nelly         breast    Breast cancer Maternal Grandmother Grandma Nelly     Kidney disease Neg Hx      Thyroid disease Neg Hx           Review of Systems  General: negative for chills, fever or weight loss  Psychological: negative for mood changes or depression  Ophthalmic: negative for blurry vision, photophobia or eye pain  ENT: see HPI  Respiratory: no cough, shortness of breath, or wheezing  Cardiovascular: no chest pain or dyspnea on exertion  Gastrointestinal: no abdominal pain, change in bowel habits, or black/ bloody stools  Musculoskeletal: negative for gait disturbance or muscular weakness  Neurological: no syncope or seizures; no ataxia  Dermatological: negative for puritis,  rash and jaundice  Hematologic/lymphatic: no easy bruising, no new lumps or bumps      Physical Exam:    Vitals:    10/07/24 1305   BP: (!) 139/90   Pulse: 70       Constitutional: Well appearing / communicating without difficutly.  NAD.  Eyes: EOM I Bilaterally  Head/Face: Normocephalic.  Negative paranasal sinus pressure/tenderness.  Salivary glands WNL.   House Brackmann I Bilaterally.    Right Ear: Auricle normal appearance. External Auditory Canal within normal limits no lesions or masses,TM w/o masses/lesions/perforations. TM mobility noted.   Left Ear: Auricle normal appearance. External Auditory Canal within normal limits no lesions or masses,TM w/o masses/lesions/perforations. TM mobility noted.  Rinne Air conduction >bone conduction bilaterally, Solares midline.   Nose: + nasal septal deviation. Inferior Turbinates 3+ bilaterally. No septal perforation. No masses/lesions. External nasal skin appears normal without masses/lesions.  Oral Cavity: Gingiva/lips within normal limits.  Dentition/gingiva healthy appearing. Mucus membranes moist. Floor of mouth soft, no masses palpated. Oral Tongue mobile. Hard Palate appears normal.    Oropharynx: Base of tongue appears normal. No masses/lesions noted. Tonsillar fossa/pharyngeal wall without lesions. Posterior oropharynx WNL.  Soft palate without masses. Midline uvula.   Neck/Lymphatic: No LAD I-VI bilaterally.  No thyromegaly.  No masses noted on exam.      Diagnostic studies:  Audiogram interpreted personally by me and discussed in detail with the patient today. Findings mild to severe SNHL; Type A tympanograms bilaterally.         Assessment:    ICD-10-CM ICD-9-CM    1. Sensorineural hearing loss (SNHL) of both ears  H90.3 389.18 Ambulatory referral/consult to ENT      2. Chronic rhinitis  J31.0 472.0       3. Nasal septal deviation  J34.2 470         The primary encounter diagnosis was Sensorineural hearing loss (SNHL) of both ears. Diagnoses of Chronic rhinitis and Nasal septal deviation were also pertinent to this visit.      Plan:  No orders of the defined types were placed in this encounter.          We reviewed the patient's recent audiogram and hearing loss in detail.  We also discussed that she is a good candidate for hearing aids, if and when she the patient is motivated.    We also discussed the use hearing  protection when exposed to loud noise, including lawn equipment. Recommend audiogram in 1 year.   Continue saline rinses BID  Start astelin 1 spray per nostril BID    Thank you kindly for allowing me to participate in the patient's care.       Sally Mo MD

## 2024-10-20 ENCOUNTER — PATIENT MESSAGE (OUTPATIENT)
Dept: OTOLARYNGOLOGY | Facility: CLINIC | Age: 64
End: 2024-10-20
Payer: MEDICARE

## 2024-11-13 ENCOUNTER — OFFICE VISIT (OUTPATIENT)
Dept: DERMATOLOGY | Facility: CLINIC | Age: 64
End: 2024-11-13
Payer: MEDICARE

## 2024-11-13 DIAGNOSIS — D18.01 CHERRY ANGIOMA: ICD-10-CM

## 2024-11-13 DIAGNOSIS — Z12.83 SKIN EXAM, SCREENING FOR CANCER: ICD-10-CM

## 2024-11-13 DIAGNOSIS — L82.1 SEBORRHEIC KERATOSES: ICD-10-CM

## 2024-11-13 DIAGNOSIS — L81.1 MELASMA: Primary | ICD-10-CM

## 2024-11-13 DIAGNOSIS — D22.9 MULTIPLE BENIGN NEVI: ICD-10-CM

## 2024-11-13 PROCEDURE — 1159F MED LIST DOCD IN RCRD: CPT | Mod: CPTII,S$GLB,, | Performed by: STUDENT IN AN ORGANIZED HEALTH CARE EDUCATION/TRAINING PROGRAM

## 2024-11-13 PROCEDURE — 1157F ADVNC CARE PLAN IN RCRD: CPT | Mod: CPTII,S$GLB,, | Performed by: STUDENT IN AN ORGANIZED HEALTH CARE EDUCATION/TRAINING PROGRAM

## 2024-11-13 PROCEDURE — 99999 PR PBB SHADOW E&M-EST. PATIENT-LVL III: CPT | Mod: PBBFAC,,, | Performed by: STUDENT IN AN ORGANIZED HEALTH CARE EDUCATION/TRAINING PROGRAM

## 2024-11-13 PROCEDURE — 1101F PT FALLS ASSESS-DOCD LE1/YR: CPT | Mod: CPTII,S$GLB,, | Performed by: STUDENT IN AN ORGANIZED HEALTH CARE EDUCATION/TRAINING PROGRAM

## 2024-11-13 PROCEDURE — 3288F FALL RISK ASSESSMENT DOCD: CPT | Mod: CPTII,S$GLB,, | Performed by: STUDENT IN AN ORGANIZED HEALTH CARE EDUCATION/TRAINING PROGRAM

## 2024-11-13 PROCEDURE — 1160F RVW MEDS BY RX/DR IN RCRD: CPT | Mod: CPTII,S$GLB,, | Performed by: STUDENT IN AN ORGANIZED HEALTH CARE EDUCATION/TRAINING PROGRAM

## 2024-11-13 PROCEDURE — 99203 OFFICE O/P NEW LOW 30 MIN: CPT | Mod: S$GLB,,, | Performed by: STUDENT IN AN ORGANIZED HEALTH CARE EDUCATION/TRAINING PROGRAM

## 2024-11-13 NOTE — PROGRESS NOTES
Subjective:      Patient ID:  Mari Ambrocio is a 64 y.o. female who presents for No chief complaint on file.    Mari Ambrocio is a 64 y.o. female who presents for: FBSE screening exam for skin cancer.    New patient    The patient has the following lesions of concern:  Location: x2 spots on back   Duration: years   Symptoms: none   Relieving factors/Previous treatments: none     Patient with new area of concern:   Location: melasma on arms- potentially caused by premarin pt has been taking for 3 years started at the same time  Previous treatments: none     Pertinent history:  History of blistering sunburns: Yes  History of tanning bed use: No  Family history of melanoma: No  Personal history of mole removal: No  Personal history of skin cancer: No         Review of Systems   Skin:  Positive for activity-related sunscreen use. Negative for daily sunscreen use and recent sunburn.   Hematologic/Lymphatic: Does not bruise/bleed easily.       Objective:   Physical Exam   Constitutional: She appears well-developed and well-nourished. No distress.   Neurological: She is alert and oriented to person, place, and time. She is not disoriented.   Psychiatric: She has a normal mood and affect.   Skin:   Areas Examined (abnormalities noted in diagram):   Scalp / Hair Palpated and Inspected  Head / Face Inspection Performed  Neck Inspection Performed  Chest / Axilla Inspection Performed  Abdomen Inspection Performed  Genitals / Buttocks / Groin Inspection Performed  Back Inspection Performed  RUE Inspected  LUE Inspection Performed  RLE Inspected  LLE Inspection Performed  Nails and Digits Inspection Performed                 Diagram Legend     Erythematous scaling macule/papule c/w actinic keratosis       Vascular papule c/w angioma      Pigmented verrucoid papule/plaque c/w seborrheic keratosis      Yellow umbilicated papule c/w sebaceous hyperplasia      Irregularly shaped tan macule c/w lentigo     1-2 mm smooth white papules  consistent with Milia      Movable subcutaneous cyst with punctum c/w epidermal inclusion cyst      Subcutaneous movable cyst c/w pilar cyst      Firm pink to brown papule c/w dermatofibroma      Pedunculated fleshy papule(s) c/w skin tag(s)      Evenly pigmented macule c/w junctional nevus     Mildly variegated pigmented, slightly irregular-bordered macule c/w mildly atypical nevus      Flesh colored to evenly pigmented papule c/w intradermal nevus       Pink pearly papule/plaque c/w basal cell carcinoma      Erythematous hyperkeratotic cursted plaque c/w SCC      Surgical scar with no sign of skin cancer recurrence      Open and closed comedones      Inflammatory papules and pustules      Verrucoid papule consistent consistent with wart     Erythematous eczematous patches and plaques     Dystrophic onycholytic nail with subungual debris c/w onychomycosis     Umbilicated papule    Erythematous-base heme-crusted tan verrucoid plaque consistent with inflamed seborrheic keratosis     Erythematous Silvery Scaling Plaque c/w Psoriasis     See annotation      Assessment / Plan:        Melasma  Gomez feathery hyperpigmented patches on BL dorsal forearms > upper arms exacerbated in summer months most c/w melasma of arms  Discussed ingredients that help with hyperpigmentation: retinoids/retinol, azelaic acid, chemical peels   Start gold bond retinol body lotion daily at night  Start azelaic acid cream 10% the ordinary BID  Most important sunscreen SPF 30+, tinted, every day in morning    Seborrheic keratoses  These are benign inherited growths without a malignant potential. Reassurance given to patient. No treatment is necessary.     Cherry angioma  This is a benign vascular lesion. Reassurance given. No treatment required.     Multiple benign nevi  Reassurance  ABCDE handout provided    Skin exam, screening for cancer  Total body skin examination performed today including at least 12 points as noted in physical examination.  No lesions suspicious for malignancy noted.    Recommend daily sun protection/avoidance, use of at least SPF 30, broad spectrum sunscreen (OTC drug), skin self examinations, and routine physician surveillance to optimize early detection    RTC  1 yr, sooner prn

## 2025-02-04 NOTE — PRE ADMISSION SCREENING
Labs sent to dr andrew   [de-identified] : modify activities use elastic sleeve for structural support try OTC meds ice as needed try topical lidocaine for pain control reviewed current medications used by this patient home exercises for functional return low dose csi  RE:  PAXTON MENA   Acct #- [id number]   Attention:  Nurse Reviewer /Medical Director    Based on my patient's condition, I strongly believe that the MRI R knee is medically.necessary.   The patient has failed oral meds, injections and PT and conservative treatment in combination or by themselves and therefore needs the MRI.   The MRI will dictate further treatment t recommendations.

## 2025-02-28 ENCOUNTER — HOSPITAL ENCOUNTER (OUTPATIENT)
Dept: RADIOLOGY | Facility: HOSPITAL | Age: 65
Discharge: HOME | End: 2025-02-28
Attending: STUDENT IN AN ORGANIZED HEALTH CARE EDUCATION/TRAINING PROGRAM
Payer: MEDICARE

## 2025-02-28 VITALS — BODY MASS INDEX: 30.32 KG/M2 | HEIGHT: 65 IN | WEIGHT: 182 LBS

## 2025-02-28 DIAGNOSIS — N95.2 VAGINAL ATROPHY: ICD-10-CM

## 2025-02-28 DIAGNOSIS — Z12.31 ENCOUNTER FOR SCREENING MAMMOGRAM FOR BREAST CANCER: ICD-10-CM

## 2025-02-28 PROCEDURE — 77063 BREAST TOMOSYNTHESIS BI: CPT | Mod: TC

## 2025-02-28 PROCEDURE — 77063 BREAST TOMOSYNTHESIS BI: CPT | Mod: 26,,, | Performed by: RADIOLOGY

## 2025-02-28 PROCEDURE — 77067 SCR MAMMO BI INCL CAD: CPT | Mod: 26,,, | Performed by: RADIOLOGY

## 2025-03-01 RX ORDER — CONJUGATED ESTROGENS 0.62 MG/G
0.5 CREAM VAGINAL
Qty: 30 G | Refills: 1 | Status: SHIPPED | OUTPATIENT
Start: 2025-03-03 | End: 2026-04-27

## 2025-03-01 NOTE — TELEPHONE ENCOUNTER
No care due was identified.  [facility] Embedded Care Due Messages. Reference number: [id number].   2/28/2025 7:57:11 PM CST

## 2025-03-01 NOTE — TELEPHONE ENCOUNTER
Refill Authorization Note     Refill Decision Note   Mari Ambrocio  is requesting a refill authorization.  Brief Assessment and Rationale for Refill:  Approve     Medication Therapy Plan:       Medication Reconciliation Completed: No   Comments:     No Care Gaps recommended.     Note composed:8:38 AM 03/01/2025

## 2025-03-05 ENCOUNTER — RESULTS FOLLOW-UP (OUTPATIENT)
Dept: FAMILY MEDICINE | Facility: CLINIC | Age: 65
End: 2025-03-05

## 2025-04-14 RX ORDER — AZELASTINE 1 MG/ML
1 SPRAY, METERED NASAL 2 TIMES DAILY
Qty: 30 ML | Refills: 3 | Status: SHIPPED | OUTPATIENT
Start: 2025-04-14 | End: 2026-04-14

## 2025-05-06 ENCOUNTER — OFFICE VISIT (OUTPATIENT)
Dept: FAMILY MEDICINE | Facility: CLINIC | Age: 65
End: 2025-05-06
Payer: MEDICARE

## 2025-05-06 VITALS
DIASTOLIC BLOOD PRESSURE: 86 MMHG | HEIGHT: 65 IN | OXYGEN SATURATION: 97 % | WEIGHT: 184.94 LBS | HEART RATE: 77 BPM | SYSTOLIC BLOOD PRESSURE: 138 MMHG | BODY MASS INDEX: 30.81 KG/M2

## 2025-05-06 DIAGNOSIS — G89.29 CHRONIC PAIN OF BOTH KNEES: Primary | ICD-10-CM

## 2025-05-06 DIAGNOSIS — M25.562 CHRONIC PAIN OF BOTH KNEES: Primary | ICD-10-CM

## 2025-05-06 DIAGNOSIS — M25.561 CHRONIC PAIN OF BOTH KNEES: Primary | ICD-10-CM

## 2025-05-06 DIAGNOSIS — M25.561 PATELLOFEMORAL ARTHRALGIA OF RIGHT KNEE: ICD-10-CM

## 2025-05-06 DIAGNOSIS — S83.207S TEAR OF LEFT MENISCUS AS CURRENT INJURY, SEQUELA: ICD-10-CM

## 2025-05-06 PROCEDURE — 1101F PT FALLS ASSESS-DOCD LE1/YR: CPT | Mod: CPTII,S$GLB,, | Performed by: STUDENT IN AN ORGANIZED HEALTH CARE EDUCATION/TRAINING PROGRAM

## 2025-05-06 PROCEDURE — 1157F ADVNC CARE PLAN IN RCRD: CPT | Mod: CPTII,S$GLB,, | Performed by: STUDENT IN AN ORGANIZED HEALTH CARE EDUCATION/TRAINING PROGRAM

## 2025-05-06 PROCEDURE — 3008F BODY MASS INDEX DOCD: CPT | Mod: CPTII,S$GLB,, | Performed by: STUDENT IN AN ORGANIZED HEALTH CARE EDUCATION/TRAINING PROGRAM

## 2025-05-06 PROCEDURE — 3079F DIAST BP 80-89 MM HG: CPT | Mod: CPTII,S$GLB,, | Performed by: STUDENT IN AN ORGANIZED HEALTH CARE EDUCATION/TRAINING PROGRAM

## 2025-05-06 PROCEDURE — 1160F RVW MEDS BY RX/DR IN RCRD: CPT | Mod: CPTII,S$GLB,, | Performed by: STUDENT IN AN ORGANIZED HEALTH CARE EDUCATION/TRAINING PROGRAM

## 2025-05-06 PROCEDURE — 3288F FALL RISK ASSESSMENT DOCD: CPT | Mod: CPTII,S$GLB,, | Performed by: STUDENT IN AN ORGANIZED HEALTH CARE EDUCATION/TRAINING PROGRAM

## 2025-05-06 PROCEDURE — 99999 PR PBB SHADOW E&M-EST. PATIENT-LVL IV: CPT | Mod: PBBFAC,,, | Performed by: STUDENT IN AN ORGANIZED HEALTH CARE EDUCATION/TRAINING PROGRAM

## 2025-05-06 PROCEDURE — 99214 OFFICE O/P EST MOD 30 MIN: CPT | Mod: S$GLB,,, | Performed by: STUDENT IN AN ORGANIZED HEALTH CARE EDUCATION/TRAINING PROGRAM

## 2025-05-06 PROCEDURE — 1125F AMNT PAIN NOTED PAIN PRSNT: CPT | Mod: CPTII,S$GLB,, | Performed by: STUDENT IN AN ORGANIZED HEALTH CARE EDUCATION/TRAINING PROGRAM

## 2025-05-06 PROCEDURE — G2211 COMPLEX E/M VISIT ADD ON: HCPCS | Mod: S$GLB,,, | Performed by: STUDENT IN AN ORGANIZED HEALTH CARE EDUCATION/TRAINING PROGRAM

## 2025-05-06 PROCEDURE — 1159F MED LIST DOCD IN RCRD: CPT | Mod: CPTII,S$GLB,, | Performed by: STUDENT IN AN ORGANIZED HEALTH CARE EDUCATION/TRAINING PROGRAM

## 2025-05-06 PROCEDURE — 3075F SYST BP GE 130 - 139MM HG: CPT | Mod: CPTII,S$GLB,, | Performed by: STUDENT IN AN ORGANIZED HEALTH CARE EDUCATION/TRAINING PROGRAM

## 2025-05-06 NOTE — PROGRESS NOTES
Progress Note  [facility]    Subjective:     Mari Ambrocio is a 64 y.o. year old female with current diagnoses of uterovaginal prolapse s/p repair who presents to clinic for knee pain.     Has b/l knee pain. R >L. Has had swelling in the R for over a year. Knee pain wakes her from sleep. No inciting trauma or injury. Prior meniscus injury in the L knee and PT resolved pain. Dances a couple of times a week and turning will aggravate it. Sitting is fine but standing after prolonged sitting hurts. Walks 2 miles a day to prevent it from getting stiff. Pain in the anterior inferior medial portion. Does have some cracking. Has hx GERD from NSAIDs. Has been taking advil and tylenol to help her sleep at night which takes the adge off but still painful. Heat helps some.     Patient Active Problem List    Diagnosis Date Noted    History of anxiety 08/26/2024    Vaginal atrophy 08/26/2024    Osteopenia 07/17/2024    Muscle spasm 09/14/2020    Muscle weakness 09/14/2020    Postmenopausal bleeding 03/25/2020    Abnormal pelvic ultrasound 03/25/2020    Rectocele, female 03/25/2020    Uterovaginal prolapse 03/25/2020    Cervical polyp 03/25/2020    Urge urinary incontinence 03/25/2020    Cystocele with prolapse 01/29/2019    Calf cramp 01/29/2019    Allergic contact dermatitis 01/29/2019    History of herpes zoster 01/29/2019        Review of patient's allergies indicates:   Allergen Reactions    Bananas [banana]     Cantaloupe     Cucumbers [cucumber fruit extract]     [place]     Watermelon         Past Medical History:   Diagnosis Date    Ganglion cyst     History of shingles     Hyperlipidemia     Kidney stone     Otitis media     PONV (postoperative nausea and vomiting)     Sciatica     Urinary tract infection       Past Surgical History:   Procedure Laterality Date    CATARACT EXTRACTION W/ INTRAOCULAR LENS IMPLANT Bilateral 07/09/2024 7/9/24 right eye; 7/23/24 [facility]    COLONOSCOPY N/A 2018    Procedure: COLONOSCOPY Gol[place];  Surgeon: Deya Harrington MD;  Location: [facility] ENDO;  Service: Endoscopy;  Laterality: N/A;    COLONOSCOPY N/A 2024    Procedure: COLONOSCOPY;  Surgeon: Sukumar Loya MD;  Location: [facility] ENDOSCOPY;  Service: Endoscopy;  Laterality: N/A;  Hill/PEG/portal/ had clearance from cataract surgery faxed to [facility] [place]  8-9 pre call completed [facility]    CYSTOSCOPY N/A 2020    Procedure: CYSTOSCOPY;  Surgeon: Corey Riley MD;  Location: [facility];  Service: OB/GYN;  Laterality: N/A;    GANGLION CYST EXCISION      HYSTERECTOMY      INSERTION OF MIDURETHRAL SLING N/A 2020    Procedure: SLING, MIDURETHRAL;  Surgeon: Corey Riley MD;  Location: [facility];  Service: OB/GYN;  Laterality: N/A;    LAPAROSCOPIC SALPINGO-OOPHORECTOMY N/A 2020    Procedure: SALPINGO-OOPHORECTOMY, LAPAROSCOPIC;  Surgeon: Corey Riley MD;  Location: [facility];  Service: OB/GYN;  Laterality: N/A;    OOPHORECTOMY      ROBOT-ASSISTED LAPAROSCOPIC ABDOMINAL SACROCOLPOPEXY N/A 2020    Procedure: ROBOTIC SACROCOLPOPEXY, ABDOMEN;  Surgeon: Corey Riley MD;  Location: [facility];  Service: OB/GYN;  Laterality: N/A;    ROBOT-ASSISTED LAPAROSCOPIC HYSTERECTOMY N/A 2020    Procedure: ROBOTIC HYSTERECTOMY;  Surgeon: Corey Riley MD;  Location: [facility];  Service: OB/GYN;  Laterality: N/A;    TUBAL LIGATION  1998    urethra widened        Family History   Problem Relation Name Age of Onset    Dementia Mother      Depression Father Luis F Kinney, 58             Alcohol abuse Father Luis F Kinney, 58             Suicide Father Luis F Gregoria, 58     No Known Problems Sister 1     Hyperlipidemia Brother 1     Heart attack Brother 1     Fibromyalgia Daughter 3     Cancer Maternal Grandmother Grandma Villegas         breast    Breast cancer Maternal Grandmother Grandma Villegas     Kidney disease Neg Hx      Thyroid disease Neg Hx       "  Social History     Tobacco Use    Smoking status: Former     Current packs/day: 0.00     Average packs/day: 0.9 packs/day for 18.8 years (17.6 ttl pk-yrs)     Types: Cigarettes     Start date:      Quit date: 10/24/2008     Years since quittin.5    Smokeless tobacco: Never   Substance Use Topics    Alcohol use: No        Objective:     Vitals:    25 1044   BP: 138/86   BP Location: Right arm   Patient Position: Sitting   Pulse: 77   SpO2: 97%   Weight: 83.9 kg (184 lb 15.5 oz)   Height: 5' 5" (1.651 m)     BMI: 30.78    Gen: No apparent distress, well nourished and developed, appears stated age  CV: RRR, S1 and S2 present, no LE edema  Resp: CTAB, normal respiratory effort  MSK: TTP medial joint line on L knee. Negative thessaly, Yassine, anterior/posterior drawer b/l. Mild edema of R knee. No TTP of R knee or hips b/l.   Neuro: Muscle strength 5/5 in LE b/l    Assessment/Plan:     Mari Ambrocio is a 64 y.o. year old female who presents to clinic for knee pain    1. Chronic pain of both knees (Primary)  Exam reassuring, anticipate improvement with conservative management. Will have patient RTC tomorrow for knee injection as time prohibited doing this today. Pt verbalized understanding and was in agreement with the plan.    - Ambulatory Referral/Consult to Physical Therapy    2. Patellofemoral arthralgia of right knee  As above  - Ambulatory Referral/Consult to Physical Therapy    3. Tear of left meniscus as current injury, sequela  As above  - Ambulatory Referral/Consult to Physical Therapy     Follow-up: tomorrow for knee injection      Carlos Moody,   Family Medicine        "

## 2025-05-07 ENCOUNTER — OFFICE VISIT (OUTPATIENT)
Dept: FAMILY MEDICINE | Facility: CLINIC | Age: 65
End: 2025-05-07
Payer: MEDICARE

## 2025-05-07 VITALS
WEIGHT: 187.63 LBS | HEART RATE: 72 BPM | DIASTOLIC BLOOD PRESSURE: 80 MMHG | OXYGEN SATURATION: 98 % | BODY MASS INDEX: 31.26 KG/M2 | SYSTOLIC BLOOD PRESSURE: 134 MMHG | HEIGHT: 65 IN

## 2025-05-07 DIAGNOSIS — G89.29 CHRONIC PAIN OF RIGHT KNEE: Primary | ICD-10-CM

## 2025-05-07 DIAGNOSIS — M25.561 CHRONIC PAIN OF RIGHT KNEE: Primary | ICD-10-CM

## 2025-05-07 PROCEDURE — 99999 PR PBB SHADOW E&M-EST. PATIENT-LVL IV: CPT | Mod: PBBFAC,,, | Performed by: STUDENT IN AN ORGANIZED HEALTH CARE EDUCATION/TRAINING PROGRAM

## 2025-05-07 RX ORDER — LIDOCAINE HYDROCHLORIDE 10 MG/ML
4 INJECTION, SOLUTION INFILTRATION; PERINEURAL
Status: DISCONTINUED | OUTPATIENT
Start: 2025-05-07 | End: 2025-05-07 | Stop reason: HOSPADM

## 2025-05-07 RX ORDER — TRIAMCINOLONE ACETONIDE 40 MG/ML
40 INJECTION, SUSPENSION INTRA-ARTICULAR; INTRAMUSCULAR
Status: DISCONTINUED | OUTPATIENT
Start: 2025-05-07 | End: 2025-05-07 | Stop reason: HOSPADM

## 2025-05-07 RX ADMIN — TRIAMCINOLONE ACETONIDE 40 MG: 40 INJECTION, SUSPENSION INTRA-ARTICULAR; INTRAMUSCULAR at 08:05

## 2025-05-07 RX ADMIN — LIDOCAINE HYDROCHLORIDE 4 ML: 10 INJECTION, SOLUTION INFILTRATION; PERINEURAL at 08:05

## 2025-05-07 NOTE — PROGRESS NOTES
Large Joint Aspiration/Injection: R knee    Date/Time: 5/7/2025 8:00 AM    Performed by: Carlos Moody DO  Authorized by: Carlos Moody, DO    Consent Done?:  Yes (Written)  Indications:  Pain  Timeout: prior to procedure the correct patient, procedure, and site was verified    Local anesthetic:  Lidocaine spray    Details:  Needle Size:  25 G  Ultrasonic Guidance for needle placement?: No    Approach:  Anterolateral  Location:  Knee  Site:  R knee  Medications:  4 mL LIDOcaine HCL 10 mg/ml (1%) 10 mg/mL (1 %); 40 mg triamcinolone acetonide 40 mg/mL  Patient tolerance:  Patient tolerated the procedure well with no immediate complications     Carlos Moody DO  Family Medicine

## 2025-05-13 ENCOUNTER — OFFICE VISIT (OUTPATIENT)
Dept: FAMILY MEDICINE | Facility: CLINIC | Age: 65
End: 2025-05-13
Payer: MEDICARE

## 2025-05-13 VITALS — OXYGEN SATURATION: 98 % | WEIGHT: 184.75 LBS | HEIGHT: 65 IN | HEART RATE: 76 BPM | BODY MASS INDEX: 30.78 KG/M2

## 2025-05-13 DIAGNOSIS — G89.29 CHRONIC PAIN OF LEFT KNEE: Primary | ICD-10-CM

## 2025-05-13 DIAGNOSIS — M25.562 CHRONIC PAIN OF LEFT KNEE: Primary | ICD-10-CM

## 2025-05-13 PROCEDURE — 99999 PR PBB SHADOW E&M-EST. PATIENT-LVL IV: CPT | Mod: PBBFAC,,, | Performed by: STUDENT IN AN ORGANIZED HEALTH CARE EDUCATION/TRAINING PROGRAM

## 2025-05-13 RX ORDER — LIDOCAINE HYDROCHLORIDE 10 MG/ML
4 INJECTION, SOLUTION INFILTRATION; PERINEURAL
Status: DISCONTINUED | OUTPATIENT
Start: 2025-05-13 | End: 2025-05-13 | Stop reason: HOSPADM

## 2025-05-13 RX ORDER — TRIAMCINOLONE ACETONIDE 40 MG/ML
40 INJECTION, SUSPENSION INTRA-ARTICULAR; INTRAMUSCULAR
Status: DISCONTINUED | OUTPATIENT
Start: 2025-05-13 | End: 2025-05-13 | Stop reason: HOSPADM

## 2025-05-13 RX ADMIN — TRIAMCINOLONE ACETONIDE 40 MG: 40 INJECTION, SUSPENSION INTRA-ARTICULAR; INTRAMUSCULAR at 08:05

## 2025-05-13 RX ADMIN — LIDOCAINE HYDROCHLORIDE 4 ML: 10 INJECTION, SOLUTION INFILTRATION; PERINEURAL at 08:05

## 2025-05-13 NOTE — PROGRESS NOTES
Large Joint Aspiration/Injection: L knee    Date/Time: 5/13/2025 8:20 AM    Performed by: Carlos Moody DO  Authorized by: Carlos Moody, DO    Consent Done?:  Yes (Written)  Indications:  Pain  Timeout: prior to procedure the correct patient, procedure, and site was verified      Local anesthesia used?: Yes    Local anesthetic:  Lidocaine spray    Details:  Needle Size:  25 G  Ultrasonic Guidance for needle placement?: No    Approach:  Anterolateral  Location:  Knee  Site:  L knee  Medications:  4 mL LIDOcaine HCL 10 mg/ml (1%) 10 mg/mL (1 %); 40 mg triamcinolone acetonide 40 mg/mL  Patient tolerance:  Patient tolerated the procedure well with no immediate complications    Carlos Moody DO  Family Medicine

## 2025-05-14 ENCOUNTER — CLINICAL SUPPORT (OUTPATIENT)
Dept: REHABILITATION | Facility: HOSPITAL | Age: 65
End: 2025-05-14
Attending: STUDENT IN AN ORGANIZED HEALTH CARE EDUCATION/TRAINING PROGRAM
Payer: MEDICARE

## 2025-05-14 DIAGNOSIS — G89.29 CHRONIC PAIN OF BOTH KNEES: Primary | ICD-10-CM

## 2025-05-14 DIAGNOSIS — M25.561 CHRONIC PAIN OF BOTH KNEES: Primary | ICD-10-CM

## 2025-05-14 DIAGNOSIS — M25.562 CHRONIC PAIN OF BOTH KNEES: Primary | ICD-10-CM

## 2025-05-14 DIAGNOSIS — M25.561 PATELLOFEMORAL ARTHRALGIA OF RIGHT KNEE: ICD-10-CM

## 2025-05-14 DIAGNOSIS — S83.207S TEAR OF LEFT MENISCUS AS CURRENT INJURY, SEQUELA: ICD-10-CM

## 2025-05-14 PROCEDURE — 97161 PT EVAL LOW COMPLEX 20 MIN: CPT | Mod: PN

## 2025-05-14 PROCEDURE — 97530 THERAPEUTIC ACTIVITIES: CPT | Mod: PN

## 2025-05-14 NOTE — PROGRESS NOTES
Outpatient Rehab    Physical Therapy Evaluation    Patient Name: Mari Ambrocio  MRN: [id number]  YOB: 1960  Encounter Date: 5/14/2025    Therapy Diagnosis:   Encounter Diagnoses   Name Primary?    Chronic pain of both knees Yes    Patellofemoral arthralgia of right knee     Tear of left meniscus as current injury, sequela      Physician: Carlos Moody DO    Physician Orders: Eval and Treat  Medical Diagnosis: Chronic pain of both knees  Patellofemoral arthralgia of right knee  Tear of left meniscus as current injury, sequela    Visit # / Visits Authorized:  1 / 1  Insurance Authorization Period: 5/6/2025 to 5/6/2026  Date of Evaluation: 5/14/2025  Plan of Care Certification: 5/14/2025 to 7/11/25    Time In: 0805   Time Out: 0900  Total Time (in minutes): 55   Total Billable Time (in minutes): 55    Intake Outcome Measure for FOTO Survey    Therapist reviewed FOTO scores for Mari Ambrocio on 5/14/2025.   FOTO report - see Media section or FOTO account episode details.     Intake Score: 34%    Precautions:       Subjective   History of Present Illness  Mari is a 64 y.o. female who reports to physical therapy with a chief concern of B knee pain.     The patient reports a medical diagnosis of M25.561,M25.562,G89.29 (ICD-10-CM) - Chronic pain of both knees  M25.561 (ICD-10-CM) - Patellofemoral arthralgia of right knee  S83.207S (ICD-10-CM) - Tear of left meniscus as current injury, sequela.            Dominant Hand: Right  History of Present Condition/Illness: Bilateral knee pain for past 10+ years, did have a right knee injury 5 years ago where she received therapy for, and right knee typically worst than left knee. Received Bilateral knee injections over the past 2 weeks, and knees are feeling much better. Took a fall last week landing on right knee but still is doing much better. 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 Pain used to wake her at night.     Pain     Patient reports a current pain level of 4/10. Pain at best is reported as 3/10. Pain at worst is reported as 6/10.   Location: B knees  Clinical Progression (since onset): Improved  Pain Qualities: Aching, Discomfort, Dull, Tightness  Pain-Relieving Factors: Activity modification, Heat, Medications - over-the-counter, Sitting, Stretching, Physical therapy, Rest  Pain-Aggravating Factors: Cooking, Kneeling, Lifting, Running, Squatting, Stair climbing, Standing         Employment  Patient does not report that: Does the patient's condition impact their ability to work?  Employment Status: Retired          Past Medical History/Physical Systems Review:   Mari Ambrocio  has a past medical history of Ganglion cyst, History of shingles, Hyperlipidemia, Kidney stone, Otitis media, PONV (postoperative nausea and vomiting), Sciatica, and Urinary tract infection.    Mari Ambrocio  has a past surgical history that includes Ganglion cyst excision; urethra widened; Colonoscopy (N/A, 04/11/2018); Tubal ligation (1998); Robot-assisted laparoscopic hysterectomy (N/A, 07/06/2020); Robot-assisted laparoscopic abdominal sacrocolpopexy (N/A, 07/06/2020); Laparoscopic salpingo-oophorectomy (N/A, 07/06/2020); Insertion of midurethral sling (N/A, 07/06/2020); Cystoscopy (N/A, 07/06/2020); Hysterectomy; Oophorectomy; Colonoscopy (N/A, 08/12/2024); and Cataract extraction w/ intraocular lens implant (Bilateral, 07/09/2024).    Mari has a current medication list which includes the following prescription(s): ascorbic acid (vitamin c), azelastine, biotin, calcium carbonate, cholecalciferol (vitamin d3), and premarin.    Review of patient's allergies indicates:   Allergen Reactions    Bananas [banana]     Cantaloupe     Cucumbers [cucumber fruit extract]     [place]     Watermelon         Objective   Knee Observations            Lower Extremity Sensation  General Lumbar/Lower Extremity  Sensation  Intact: Right and Left  Right Lumbar/Lower Extremity Sensation  Intact: Light Touch, Sharp/Dull Discrimination, Static Two Point Discrimination, Dynamic Two Point Discrimination, Kinesthesia, and Proprioception  Right Lumbar/Lower Extremity Sensation Stocking Glove Pattern: No    Left Lumbar/Lower Extremity Sensation  Intact: Light Touch, Static Two Point Discrimination, Dynamic Two Point Discrimination, Sharp/Dull Discrimination, Kinesthesia, and Proprioception  Left Lumbar/Lower Extremity Sensation Stocking Glove Pattern: No              Knee Range of Motion   Right Knee   Active (deg) Passive (deg) Pain   Flexion 129 130     Extension 0 0         Left Knee   Active (deg) Passive (deg) Pain   Flexion 130 131     Extension 0 0                        Hip Strength - Planes of Motion   Right Strength Right Pain Left Strength Left  Pain   Flexion (L2) 4-   4-     Extension 4   4-     ABduction 4   4     ADduction 4+   4+     Internal Rotation 4+   4+     External Rotation 4   4         Knee Strength   Right Strength Right Pain Left Strength Left  Pain   Flexion (S2) 4   4     Prone Flexion 4   4     Extension (L3) 4   4            Ankle/Foot Strength - Planes of Motion   Right Strength Right Pain Left Strength Left  Pain   Dorsiflexion (L4) 5   5     Plantar Flexion (S1) 4+   4+     Inversion           Eversion           Great Toe Flexion           Great Toe Extension (L5)           Lesser Toes Flexion           Lesser Toes Extension                  Knee Special Tests  Knee Ligament Tests  Negative: Right Anterior Drawer, Left Anterior Drawer, Right Posterior Drawer, and Left Posterior Drawer  Positive: Left Varus Stress at 0 Degrees and Left Varus Stress at 30 Degrees  Negative: Right Valgus Stress at 0 Degrees, Left Valgus Stress at 0 Degrees, Right Varus Stress at 0 Degrees, Right Valgus Stress at 30 Degrees, Left Valgus Stress at 30 Degrees, and Right Varus Stress at 30 Degrees       Knee Meniscal  Tests  Positive: Right Lateral Yassine  Negative: Left Lateral Yassine, Right Medial Yassine, and Left Medial Yassine                  Knee Patellar Screening       Right Patellar Mobility  Hypomobile: Superior  Left Patellar Mobility  Hypomobile: Superior           Transfers Assessment  Sit to Stand Assistance: Independent  Chair to Bed Assistance: Independent  Bed to Chair Assistance: Independent  Car Transfer Assistance: Independent    Bed Mobility Assessment  Rolling Assistance: Independent  Sidelying to Sit Assistance: Independent  Sit to Sidelying Assistance: Independent  Scooting to Edge of Bed Assistance: Independent      Ambulation Assistance Required  Surface With  Assistive Device Without Assistive Device Details   Level   Independent      Uneven   Independent     Curb           Gait Analysis  Base of Support: Normal                   Treatment:  Therapeutic Activity  TA 1: Bridges: 10x DL  TA 2: SLR: 10x B  TA 3: SL hip abd: 10x B  TA 4: LAQ: 10x right red TB  TA 5: Education    Time Entry(in minutes):  PT Evaluation (Low) Time Entry: 45  Therapeutic Activity Time Entry: 10    Assessment & Plan   Assessment  Mari presents with a condition of Low complexity.   Presentation of Symptoms: Stable  Will Comorbidities Impact Care: Yes       Functional Limitations: Activity tolerance, Transfers, Participating in leisure activities, Pain with ADLs/IADLs, Performing household chores, Range of motion, Functional mobility, Disrupted sleep pattern, Carrying objects, Completing work/school activities, Decreased ambulation distance/endurance, Getting off the floor, Participating in sports  Impairments: Abnormal gait, Abnormal or restricted range of motion, Activity intolerance, Impaired balance, Impaired physical strength, Lack of appropriate home exercise program, Pain with functional activity    Patient Goal for Therapy (PT): TO get back to walking without knee pain  Prognosis: Good  Assessment Details: Pt is a  64 year old female diagnosed with Chronic pain of both knees, Patellofemoral arthralgia of right knee, and Tear of left meniscus as current injury, sequela presenting to OPPT. Pt presents with decreased Bilateral hip strength, pain with transitions to stand after prolonged sitting, right patello-femoral mal-tracking, right knee meniscal pathology (low grade), and pain with squatting and knee activities.    Plan  From a physical therapy perspective, the patient would benefit from: Skilled Rehab Services    Planned therapy interventions include: Therapeutic exercise, Therapeutic activities, Neuromuscular re-education, Manual therapy, ADLs/IADLs, Gait training, Orthotic management and training, and Wound care.    Planned modalities to include: Electrical stimulation - attended, Electrical stimulation - passive/unattended, Mechanical traction, Cryotherapy (cold pack), and Thermotherapy (hot pack).        Visit Frequency: 2 times Per Week for 8 Weeks.       This plan was discussed with Patient.   Discussion participants: Agreed Upon Plan of Care             Patient's spiritual, cultural, and educational needs considered and patient agreeable to plan of care and goals.     Education  Education was done with Patient. The patient's learning style includes Listening. The patient Demonstrates understanding.         Rehab process and prognosis, Importance of home exercise program, Avoidance of concordant pain(s), and Rest as needed       Goals:   Active       Changing body position       Patient will demonstrate moving sit to stand 20x without knee pain.       Start:  05/14/25    Expected End:  07/11/25               Functional outcome       Patient will show a significant change in FOTO patient-reported outcome tool to demonstrate subjective improvement       Start:  05/14/25    Expected End:  07/11/25            Patient stated goal: TO get back to walking without knee pain        Start:  05/14/25    Expected End:  07/11/25             Patient will demonstrate independence in home program for support of progression       Start:  05/14/25    Expected End:  07/11/25               Pain       Patient will report pain of 1/10 demonstrating a reduction of overall pain at rest and for ADLs.       Start:  05/14/25    Expected End:  07/11/25            Patient will report a 2 point reduction in pain while performing stairs and sit to stand after prolonged sitting.       Start:  05/14/25    Expected End:  07/11/25               Strength       Patient will achieve bilateral hip strength of 5/5       Start:  05/14/25    Expected End:  07/11/25            Patient will achieve bilateral knee strength of 5/5       Start:  05/14/25    Expected End:  07/11/25                Guicho Lopez, PT, DPT

## 2025-05-19 ENCOUNTER — CLINICAL SUPPORT (OUTPATIENT)
Dept: REHABILITATION | Facility: HOSPITAL | Age: 65
End: 2025-05-19
Payer: MEDICARE

## 2025-05-19 DIAGNOSIS — M62.81 MUSCLE WEAKNESS: Primary | ICD-10-CM

## 2025-05-19 PROBLEM — M25.562 KNEE PAIN, BILATERAL: Status: ACTIVE | Noted: 2025-05-19

## 2025-05-19 PROBLEM — M25.561 KNEE PAIN, BILATERAL: Status: ACTIVE | Noted: 2025-05-19

## 2025-05-19 PROCEDURE — 97530 THERAPEUTIC ACTIVITIES: CPT | Mod: PN

## 2025-05-19 PROCEDURE — 97112 NEUROMUSCULAR REEDUCATION: CPT | Mod: PN

## 2025-05-19 NOTE — PROGRESS NOTES
Outpatient Rehab    Physical Therapy Visit    Patient Name: Mari Ambrocio  MRN: [id number]  YOB: 1960  Encounter Date: 5/19/2025    Therapy Diagnosis:   Encounter Diagnosis   Name Primary?    Muscle weakness Yes     Physician: Carlos Moody DO    Physician Orders: Eval and Treat  Medical Diagnosis: Chronic pain of both knees  Patellofemoral arthralgia of right knee  Tear of left meniscus as current injury, sequela    Visit # / Visits Authorized:  1 / 20  Insurance Authorization Period: 5/14/2025 to 12/31/2025  Date of Evaluation: 5/14/2025  Plan of Care Certification: 5/14/2025 to 7/11/2025      PT/PTA: PT   Number of PTA visits since last PT visit:0  Time In: 1005   Time Out: 1100  Total Time (in minutes): 55   Total Billable Time (in minutes): 55    FOTO:  Intake Score:  %  Survey Score 2:  %  Survey Score 3:  %    Precautions:       Subjective   Cut the grass this weekend and did some walking today. Has cut the length of her walking distance and knees are doing alright. Pain did not wake her last night..  Pain reported as 6/10. B knees    Objective            Treatment:  Balance/Neuromuscular Re-Education  NMR 1: quad sets: 12x10'' holds, towel under knees  NMR 2: SLR: 3x10 1# B  NMR 3: SL hip abed: 3x10 1#  NMR 4: SAQ: 10x10'' holds, 8#  NMR 5: SL clams: 20x3'' holds, red TB  NMR 6: LAQ: 2x15 red TB, B  Therapeutic Activity  TA 1: Bridges: 2x15 DL  TA 2: Leg press: 3x10 DL 6 plates    Time Entry(in minutes):  Neuromuscular Re-Education Time Entry: 40  Therapeutic Activity Time Entry: 15    Assessment & Plan   Assessment: Minimal to no pain throughout the session, working primarily on quad and lateral hip strength, and easing into any single leg WB activites. Edcuation on her to conitnue to spread out her WB activities at home and keep her recreational walking to under 0.5 miles if possible for now.       Patient will continue to benefit from skilled outpatient physical therapy to address the  deficits listed in the problem list box on initial evaluation, provide pt/family education and to maximize pt's level of independence in the home and community environment.     Patient's spiritual, cultural, and educational needs considered and patient agreeable to plan of care and goals.           Plan:      Goals:   Active       Changing body position       Patient will demonstrate moving sit to stand 20x without knee pain.       Start:  05/14/25    Expected End:  07/11/25               Functional outcome       Patient will show a significant change in FOTO patient-reported outcome tool to demonstrate subjective improvement       Start:  05/14/25    Expected End:  07/11/25            Patient stated goal: TO get back to walking without knee pain        Start:  05/14/25    Expected End:  07/11/25            Patient will demonstrate independence in home program for support of progression       Start:  05/14/25    Expected End:  07/11/25               Pain       Patient will report pain of 1/10 demonstrating a reduction of overall pain at rest and for ADLs.       Start:  05/14/25    Expected End:  07/11/25            Patient will report a 2 point reduction in pain while performing stairs and sit to stand after prolonged sitting.       Start:  05/14/25    Expected End:  07/11/25               Strength       Patient will achieve bilateral hip strength of 5/5       Start:  05/14/25    Expected End:  07/11/25            Patient will achieve bilateral knee strength of 5/5       Start:  05/14/25    Expected End:  07/11/25                Guicho Lopez, PT, DPT

## 2025-05-21 ENCOUNTER — CLINICAL SUPPORT (OUTPATIENT)
Dept: REHABILITATION | Facility: HOSPITAL | Age: 65
End: 2025-05-21
Payer: MEDICARE

## 2025-05-21 DIAGNOSIS — M62.81 MUSCLE WEAKNESS: Primary | ICD-10-CM

## 2025-05-21 DIAGNOSIS — M25.562 ACUTE PAIN OF BOTH KNEES: ICD-10-CM

## 2025-05-21 DIAGNOSIS — M25.561 ACUTE PAIN OF BOTH KNEES: ICD-10-CM

## 2025-05-21 PROCEDURE — 97112 NEUROMUSCULAR REEDUCATION: CPT | Mod: PN

## 2025-05-21 PROCEDURE — 97530 THERAPEUTIC ACTIVITIES: CPT | Mod: PN

## 2025-05-21 NOTE — PROGRESS NOTES
Outpatient Rehab    Physical Therapy Visit    Patient Name: Mari Ambrocio  MRN: [id number]  YOB: 1960  Encounter Date: 5/21/2025    Therapy Diagnosis:   Encounter Diagnoses   Name Primary?    Muscle weakness Yes    Acute pain of both knees      Physician: Carlos Moody DO    Physician Orders: Eval and Treat  Medical Diagnosis: Chronic pain of both knees  Patellofemoral arthralgia of right knee  Tear of left meniscus as current injury, sequela    Visit # / Visits Authorized:  2 / 20  Insurance Authorization Period: 5/14/2025 to 12/31/2025  Date of Evaluation: 5/14/2025  Plan of Care Certification: 5/14/2025 to 7/11/2025      PT/PTA: PT   Number of PTA visits since last PT visit:0  Time In: 1005   Time Out: 1100  Total Time (in minutes): 55   Total Billable Time (in minutes): 55    FOTO:  Intake Score:  %  Survey Score 2:  %  Survey Score 3:  %    Precautions:       Subjective   Knees are feeling a bit better, has not cut the grass yet this week, and doing some of exercises at home..  Pain reported as 5/10. B knees R >L    Objective            Treatment:  Balance/Neuromuscular Re-Education  NMR 1: quad sets: 12x10'' holds, towel under knees - d/c next  NMR 2: SLR: 2x20 1.5# B  NMR 3: SL hip abd: 3x10 1.5#  NMR 4: SL clams: 2x15x3'' holds, red TB  NMR 5: LAQ: 2x20 greenTB, B  Therapeutic Activity  TA 1: Bridges: 2x15 DL  TA 2: Leg press: 2x20 DL 6 plates    Time Entry(in minutes):  Neuromuscular Re-Education Time Entry: 40  Therapeutic Activity Time Entry: 15    Assessment & Plan   Assessment: Focusing on quad strength, minimal to no pain througout session, holding on single leg WB activities for now, and advanced all hip and quad resistance today. She still struggles with obtaining fo a good bridge height.       Patient will continue to benefit from skilled outpatient physical therapy to address the deficits listed in the problem list box on initial evaluation, provide pt/family education and to  maximize pt's level of independence in the home and community environment.     Patient's spiritual, cultural, and educational needs considered and patient agreeable to plan of care and goals.           Plan:      Goals:   Active       Changing body position       Patient will demonstrate moving sit to stand 20x without knee pain.       Start:  05/14/25    Expected End:  07/11/25               Functional outcome       Patient will show a significant change in FOTO patient-reported outcome tool to demonstrate subjective improvement       Start:  05/14/25    Expected End:  07/11/25            Patient stated goal: TO get back to walking without knee pain        Start:  05/14/25    Expected End:  07/11/25            Patient will demonstrate independence in home program for support of progression       Start:  05/14/25    Expected End:  07/11/25               Pain       Patient will report pain of 1/10 demonstrating a reduction of overall pain at rest and for ADLs.       Start:  05/14/25    Expected End:  07/11/25            Patient will report a 2 point reduction in pain while performing stairs and sit to stand after prolonged sitting.       Start:  05/14/25    Expected End:  07/11/25               Strength       Patient will achieve bilateral hip strength of 5/5       Start:  05/14/25    Expected End:  07/11/25            Patient will achieve bilateral knee strength of 5/5       Start:  05/14/25    Expected End:  07/11/25                Guicho Lopez, PT, DPT

## 2025-05-26 ENCOUNTER — CLINICAL SUPPORT (OUTPATIENT)
Dept: REHABILITATION | Facility: HOSPITAL | Age: 65
End: 2025-05-26
Payer: MEDICARE

## 2025-05-26 DIAGNOSIS — M62.81 MUSCLE WEAKNESS: Primary | ICD-10-CM

## 2025-05-26 PROCEDURE — 97112 NEUROMUSCULAR REEDUCATION: CPT | Mod: PN

## 2025-05-26 PROCEDURE — 97530 THERAPEUTIC ACTIVITIES: CPT | Mod: PN

## 2025-05-26 NOTE — PROGRESS NOTES
Outpatient Rehab    Physical Therapy Visit    Patient Name: Mari Ambrocio  MRN: [id number]  YOB: 1960  Encounter Date: 5/26/2025    Therapy Diagnosis:   Encounter Diagnosis   Name Primary?    Muscle weakness Yes     Physician: Carlos Moody DO    Physician Orders: Eval and Treat  Medical Diagnosis: Chronic pain of both knees  Patellofemoral arthralgia of right knee  Tear of left meniscus as current injury, sequela    Visit # / Visits Authorized:  4 / 20  Insurance Authorization Period: 5/14/2025 to 12/31/2025  Date of Evaluation: 5/14/2025  Plan of Care Certification: 5/14/2025 to 7/11/2025      PT/PTA: PT   Number of PTA visits since last PT visit:0  Time In: 1005   Time Out: 1100  Total Time (in minutes): 55   Total Billable Time (in minutes): 55    FOTO:  Intake Score:  %  Survey Score 2:  %  Survey Score 3:  %    Precautions:       Subjective   Doing well, having less knee pain with each..  Pain reported as 3/10. B knees R >L    Objective            Treatment:  Balance/Neuromuscular Re-Education  NMR 1: quad sets: 12x10'' holds, towel under knees  NMR 2: SLR: 3x10 2# B  NMR 3: SL hip abd: 3x10 2#  NMR 4: SAQ: 10x10'' holds, 8#  NMR 5: SL clams: 20x3'' holds, red TB  NMR 6: LAQ: 2x15 green TB, B  Therapeutic Activity  TA 1: Bridges: 2x15 DL  TA 2: Leg press: 3x10 DL 6 plates    Time Entry(in minutes):  Neuromuscular Re-Education Time Entry: 40  Therapeutic Activity Time Entry: 15    Assessment & Plan   Assessment: No limitations or pain in therapy. Progressing in quad strength. She still struggles with a high bridge, may benefit from some lumbar facet closing mobility training.       Patient will continue to benefit from skilled outpatient physical therapy to address the deficits listed in the problem list box on initial evaluation, provide pt/family education and to maximize pt's level of independence in the home and community environment.     Patient's spiritual, cultural, and educational  needs considered and patient agreeable to plan of care and goals.           Plan:      Goals:   Active       Changing body position       Patient will demonstrate moving sit to stand 20x without knee pain.       Start:  05/14/25    Expected End:  07/11/25               Functional outcome       Patient will show a significant change in FOTO patient-reported outcome tool to demonstrate subjective improvement       Start:  05/14/25    Expected End:  07/11/25            Patient stated goal: TO get back to walking without knee pain        Start:  05/14/25    Expected End:  07/11/25            Patient will demonstrate independence in home program for support of progression       Start:  05/14/25    Expected End:  07/11/25               Pain       Patient will report pain of 1/10 demonstrating a reduction of overall pain at rest and for ADLs.       Start:  05/14/25    Expected End:  07/11/25            Patient will report a 2 point reduction in pain while performing stairs and sit to stand after prolonged sitting.       Start:  05/14/25    Expected End:  07/11/25               Strength       Patient will achieve bilateral hip strength of 5/5       Start:  05/14/25    Expected End:  07/11/25            Patient will achieve bilateral knee strength of 5/5       Start:  05/14/25    Expected End:  07/11/25                Guicho Lopez, PT, DPT

## 2025-05-28 ENCOUNTER — CLINICAL SUPPORT (OUTPATIENT)
Dept: REHABILITATION | Facility: HOSPITAL | Age: 65
End: 2025-05-28
Payer: MEDICARE

## 2025-05-28 DIAGNOSIS — M25.562 ACUTE PAIN OF BOTH KNEES: ICD-10-CM

## 2025-05-28 DIAGNOSIS — M62.81 MUSCLE WEAKNESS: Primary | ICD-10-CM

## 2025-05-28 DIAGNOSIS — M25.561 ACUTE PAIN OF BOTH KNEES: ICD-10-CM

## 2025-05-28 PROCEDURE — 97530 THERAPEUTIC ACTIVITIES: CPT | Mod: PN,CQ

## 2025-05-28 PROCEDURE — 97112 NEUROMUSCULAR REEDUCATION: CPT | Mod: PN,CQ

## 2025-05-28 NOTE — PROGRESS NOTES
Outpatient Rehab    Physical Therapy Visit    Patient Name: Mari Ambrocio  MRN: [id number]  YOB: 1960  Encounter Date: 5/28/2025    Therapy Diagnosis:   Encounter Diagnoses   Name Primary?    Muscle weakness Yes    Acute pain of both knees      Physician: Carlos Moody DO    Physician Orders: Eval and Treat  Medical Diagnosis: Chronic pain of both knees  Patellofemoral arthralgia of right knee  Tear of left meniscus as current injury, sequela    Visit # / Visits Authorized:  4 / 20  Insurance Authorization Period: 5/14/2025 to 12/31/2025  Date of Evaluation: 5/14/2025  Plan of Care Certification: 5/14/2025 to 7/11/2025      PT/PTA: PTA   Number of PTA visits since last PT visit:1  Time In: 0800   Time Out: 0900  Total Time (in minutes): 60   Total Billable Time (in minutes): 38    FOTO:  Intake Score:  %  Survey Score 2:  %  Survey Score 3:  %    Precautions:       Subjective   B knee pain.  Pain reported as 5/10. B knees R >L    Objective            Treatment:  Balance/Neuromuscular Re-Education  NMR 2: SLR: 3x20 1.5# B  NMR 3: SL hip abd: 3x10 1.5#  NMR 4: SL clams: 2x15x3'' holds, red TB  NMR 6: Cybex LAQ 30 pl  Therapeutic Activity  TA 1: Bridges: 2x15 DL  TA 2: Leg press: 2x20 DL 8 plates, 3x10 SL ea 4 plates  TA 3: SL hip abd: 10x B    Time Entry(in minutes):  Neuromuscular Re-Education Time Entry: 28  Therapeutic Activity Time Entry: 10    Assessment & Plan   Assessment: Session focusing on quad strengthening/ ROM within tolerance. Added SL leg press today for continued weight resisted activities. Verbal instructions provided with hip abduction in sidelying for proper hip alignment (technique)  Evaluation/Treatment Tolerance: Patient tolerated treatment well    The patient will continue to benefit from skilled outpatient physical therapy in order to address the deficits listed in the problem list on the initial evaluation, provide patient and family education, and maximize the patients level  of independence in the home and community environments.     The patient's spiritual, cultural, and educational needs were considered, and the patient is agreeable to the plan of care and goals.           Plan: cont to progress PT, advance resistance as appropriate    Goals:   Active       Changing body position       Patient will demonstrate moving sit to stand 20x without knee pain.       Start:  05/14/25    Expected End:  07/11/25               Functional outcome       Patient will show a significant change in FOTO patient-reported outcome tool to demonstrate subjective improvement       Start:  05/14/25    Expected End:  07/11/25            Patient stated goal: TO get back to walking without knee pain        Start:  05/14/25    Expected End:  07/11/25            Patient will demonstrate independence in home program for support of progression       Start:  05/14/25    Expected End:  07/11/25               Pain       Patient will report pain of 1/10 demonstrating a reduction of overall pain at rest and for ADLs.       Start:  05/14/25    Expected End:  07/11/25            Patient will report a 2 point reduction in pain while performing stairs and sit to stand after prolonged sitting.       Start:  05/14/25    Expected End:  07/11/25               Strength       Patient will achieve bilateral hip strength of 5/5       Start:  05/14/25    Expected End:  07/11/25            Patient will achieve bilateral knee strength of 5/5       Start:  05/14/25    Expected End:  07/11/25                Raffaele Suarez PTA

## 2025-06-02 ENCOUNTER — CLINICAL SUPPORT (OUTPATIENT)
Dept: REHABILITATION | Facility: HOSPITAL | Age: 65
End: 2025-06-02
Payer: MEDICARE

## 2025-06-02 DIAGNOSIS — M25.561 ACUTE PAIN OF BOTH KNEES: ICD-10-CM

## 2025-06-02 DIAGNOSIS — M62.81 MUSCLE WEAKNESS: Primary | ICD-10-CM

## 2025-06-02 DIAGNOSIS — M25.562 ACUTE PAIN OF BOTH KNEES: ICD-10-CM

## 2025-06-02 PROCEDURE — 97112 NEUROMUSCULAR REEDUCATION: CPT | Mod: PN,CQ

## 2025-06-02 PROCEDURE — 97530 THERAPEUTIC ACTIVITIES: CPT | Mod: PN,CQ

## 2025-06-04 ENCOUNTER — CLINICAL SUPPORT (OUTPATIENT)
Dept: REHABILITATION | Facility: HOSPITAL | Age: 65
End: 2025-06-04
Payer: MEDICARE

## 2025-06-04 DIAGNOSIS — M25.562 ACUTE PAIN OF BOTH KNEES: ICD-10-CM

## 2025-06-04 DIAGNOSIS — M62.81 MUSCLE WEAKNESS: Primary | ICD-10-CM

## 2025-06-04 DIAGNOSIS — M25.561 ACUTE PAIN OF BOTH KNEES: ICD-10-CM

## 2025-06-04 PROCEDURE — 97112 NEUROMUSCULAR REEDUCATION: CPT | Mod: PN,CQ

## 2025-06-04 PROCEDURE — 97530 THERAPEUTIC ACTIVITIES: CPT | Mod: PN,CQ

## 2025-06-09 ENCOUNTER — CLINICAL SUPPORT (OUTPATIENT)
Dept: REHABILITATION | Facility: HOSPITAL | Age: 65
End: 2025-06-09
Payer: MEDICARE

## 2025-06-09 DIAGNOSIS — M62.81 MUSCLE WEAKNESS: Primary | ICD-10-CM

## 2025-06-09 PROCEDURE — 97530 THERAPEUTIC ACTIVITIES: CPT | Mod: PN

## 2025-06-09 PROCEDURE — 97112 NEUROMUSCULAR REEDUCATION: CPT | Mod: PN

## 2025-06-11 ENCOUNTER — CLINICAL SUPPORT (OUTPATIENT)
Dept: REHABILITATION | Facility: HOSPITAL | Age: 65
End: 2025-06-11
Payer: MEDICARE

## 2025-06-11 DIAGNOSIS — M25.562 ACUTE PAIN OF BOTH KNEES: ICD-10-CM

## 2025-06-11 DIAGNOSIS — M62.81 MUSCLE WEAKNESS: Primary | ICD-10-CM

## 2025-06-11 DIAGNOSIS — M25.561 ACUTE PAIN OF BOTH KNEES: ICD-10-CM

## 2025-06-11 PROCEDURE — 97530 THERAPEUTIC ACTIVITIES: CPT | Mod: PN,CQ

## 2025-06-11 PROCEDURE — 97112 NEUROMUSCULAR REEDUCATION: CPT | Mod: PN,CQ

## 2025-06-11 NOTE — PROGRESS NOTES
"  Outpatient Rehab    Physical Therapy Visit    Patient Name: Mari Ambrocio  MRN: [id number]  YOB: 1960  Encounter Date: 6/11/2025    Therapy Diagnosis:   Encounter Diagnoses   Name Primary?    Muscle weakness Yes    Acute pain of both knees      Physician: Carlos Moody DO    Physician Orders: Eval and Treat  Medical Diagnosis: Chronic pain of both knees  Patellofemoral arthralgia of right knee  Tear of left meniscus as current injury, sequela  Surgical Diagnosis: Not applicable for this Episode   Surgical Date: Not applicable for this Episode    Visit # / Visits Authorized:  10 / 20  Insurance Authorization Period: 5/14/2025 to 12/31/2025  Date of Evaluation: 5/14/2025  Plan of Care Certification: 5/14/2025 to 7/11/2025      PT/PTA: PTA   Number of PTA visits since last PT visit:1  Time In: 1000   Time Out: 1101  Total Time (in minutes): 61   Total Billable Time (in minutes): 38    FOTO:  Intake Score:  %  Survey Score 2:  %  Survey Score 3:  %    Precautions:       Subjective   "they aren't that bad right now"..  Pain reported as 3/10. B knees 1/10 L,    3/10 R knee    Objective            Treatment:  Balance/Neuromuscular Re-Education  NMR 1: quad sets: 12x10'' holds, towel under knees  NMR 2: SLR: 2x20 2# B  NMR 3: SL hip abd: 2x15 2# B  NMR 4: SL clams: 2x15x3'' holds, red TB  NMR 5: LAQ: 3x15 greenTB  NMR 6: Cybex LAQ 30 pl 3x10 B  Therapeutic Activity  TA 1: Bridges: 2x15 DL  TA 2: Leg press: 2x20 DL 8 plates, 3x10 SL ea 4 plates  TA 3: lateral step downs: 2x12 B, 4 inch step  TA 4: LAQ: 10x2 right red TB  TA 5: stationary cycle x 7 min level 2    Time Entry(in minutes):  Neuromuscular Re-Education Time Entry: 8  Therapeutic Activity Time Entry: 30    Assessment & Plan   Assessment: Pt completed today's session focusing on B LE strengthening/ ROM. Progressing well with standing/ resistive activities today.   Evaluation/Treatment Tolerance: Patient tolerated treatment well    The patient will " continue to benefit from skilled outpatient physical therapy in order to address the deficits listed in the problem list on the initial evaluation, provide patient and family education, and maximize the patients level of independence in the home and community environments.     The patient's spiritual, cultural, and educational needs were considered, and the patient is agreeable to the plan of care and goals.           Plan: cont to progress PT, advance LE strengthening as tolerated.    Goals:   Active       Changing body position       Patient will demonstrate moving sit to stand 20x without knee pain.       Start:  05/14/25    Expected End:  07/11/25               Functional outcome       Patient will show a significant change in FOTO patient-reported outcome tool to demonstrate subjective improvement       Start:  05/14/25    Expected End:  07/11/25            Patient stated goal: TO get back to walking without knee pain        Start:  05/14/25    Expected End:  07/11/25            Patient will demonstrate independence in home program for support of progression       Start:  05/14/25    Expected End:  07/11/25               Pain       Patient will report pain of 1/10 demonstrating a reduction of overall pain at rest and for ADLs.       Start:  05/14/25    Expected End:  07/11/25            Patient will report a 2 point reduction in pain while performing stairs and sit to stand after prolonged sitting.       Start:  05/14/25    Expected End:  07/11/25               Strength       Patient will achieve bilateral hip strength of 5/5       Start:  05/14/25    Expected End:  07/11/25            Patient will achieve bilateral knee strength of 5/5       Start:  05/14/25    Expected End:  07/11/25                Raffaele Suarez PTA

## 2025-06-11 NOTE — PROGRESS NOTES
Outpatient Rehab    Physical Therapy Visit    Patient Name: Mari Ambrocio  MRN: [id number]  YOB: 1960  Encounter Date: 6/9/2025    Therapy Diagnosis:   Encounter Diagnosis   Name Primary?    Muscle weakness Yes     Physician: Carlos Moody DO    Physician Orders: Eval and Treat  Medical Diagnosis: Chronic pain of both knees  Patellofemoral arthralgia of right knee  Tear of left meniscus as current injury, sequela    Visit # / Visits Authorized:  7 / 20  Insurance Authorization Period: 5/14/2025 to 12/31/2025  Date of Evaluation: 5/14/2025  Plan of Care Certification: 5/14/2025 to 7/11/2025      PT/PTA: PT   Number of PTA visits since last PT visit:0  Time In: 1005   Time Out: 1100  Total Time (in minutes): 55   Total Billable Time (in minutes): 55    FOTO:  Intake Score:  %  Survey Score 2:  %  Survey Score 3:  %    Precautions:       Subjective   SOme low grade B knee pain. Staying active with walking 2 miles a day, cutting grass, and planning other activities..  Pain reported as 4/10. B knees R>L    Objective            Treatment:  Balance/Neuromuscular Re-Education  NMR 2: SLR: 2x20 2# B  NMR 3: SL hip abd: 2x15 2# B  NMR 4: SL clams: 2x15x3'' holds, red TB  NMR 5: LAQ: 3x15 greenTB  Therapeutic Activity  TA 1: Bridges: 2x15 DL  TA 2: Leg press: 2x20 DL 8 plates, 3x10 SL ea 4 plates  TA 3: lateral step downs: 2x12 B, 4 inch step    Time Entry(in minutes):  Neuromuscular Re-Education Time Entry: 30  Therapeutic Activity Time Entry: 25    Assessment & Plan   Assessment: Education on decreasing walking and standing activities to further improve knee pain to make way for more quad strengthening. Progressing overall, and advanced to staniding SL knee loading without pain.       The patient will continue to benefit from skilled outpatient physical therapy in order to address the deficits listed in the problem list on the initial evaluation, provide patient and family education, and maximize the  patients level of independence in the home and community environments.     The patient's spiritual, cultural, and educational needs were considered, and the patient is agreeable to the plan of care and goals.           Plan:      Goals:   Active       Changing body position       Patient will demonstrate moving sit to stand 20x without knee pain.       Start:  05/14/25    Expected End:  07/11/25               Functional outcome       Patient will show a significant change in FOTO patient-reported outcome tool to demonstrate subjective improvement       Start:  05/14/25    Expected End:  07/11/25            Patient stated goal: TO get back to walking without knee pain        Start:  05/14/25    Expected End:  07/11/25            Patient will demonstrate independence in home program for support of progression       Start:  05/14/25    Expected End:  07/11/25               Pain       Patient will report pain of 1/10 demonstrating a reduction of overall pain at rest and for ADLs.       Start:  05/14/25    Expected End:  07/11/25            Patient will report a 2 point reduction in pain while performing stairs and sit to stand after prolonged sitting.       Start:  05/14/25    Expected End:  07/11/25               Strength       Patient will achieve bilateral hip strength of 5/5       Start:  05/14/25    Expected End:  07/11/25            Patient will achieve bilateral knee strength of 5/5       Start:  05/14/25    Expected End:  07/11/25                Guicho Lopez, PT, DPT

## 2025-06-16 ENCOUNTER — CLINICAL SUPPORT (OUTPATIENT)
Dept: REHABILITATION | Facility: HOSPITAL | Age: 65
End: 2025-06-16
Payer: MEDICARE

## 2025-06-16 DIAGNOSIS — M25.561 ACUTE PAIN OF BOTH KNEES: ICD-10-CM

## 2025-06-16 DIAGNOSIS — M62.81 MUSCLE WEAKNESS: Primary | ICD-10-CM

## 2025-06-16 DIAGNOSIS — M25.562 ACUTE PAIN OF BOTH KNEES: ICD-10-CM

## 2025-06-16 PROCEDURE — 97530 THERAPEUTIC ACTIVITIES: CPT | Mod: PN,CQ

## 2025-06-16 PROCEDURE — 97112 NEUROMUSCULAR REEDUCATION: CPT | Mod: PN,CQ

## 2025-06-16 NOTE — PROGRESS NOTES
"  Outpatient Rehab    Physical Therapy Visit    Patient Name: Mari Ambrocio  MRN: [id number]  YOB: 1960  Encounter Date: 6/16/2025    Therapy Diagnosis:   Encounter Diagnoses   Name Primary?    Muscle weakness Yes    Acute pain of both knees      Physician: Carlos Moody DO    Physician Orders: Eval and Treat  Medical Diagnosis: Chronic pain of both knees  Patellofemoral arthralgia of right knee  Tear of left meniscus as current injury, sequela  Surgical Diagnosis: Not applicable for this Episode   Surgical Date: Not applicable for this Episode  Days Since Last Surgery: Not applicable for this Episode    Visit # / Visits Authorized:  9 / 20  Insurance Authorization Period: 5/14/2025 to 12/31/2025  Date of Evaluation: 5/14/2025  Plan of Care Certification: 5/14/2025 to 7/11/2025      PT/PTA: PTA   Number of PTA visits since last PT visit:2  Time In: 0900   Time Out: 1000  Total Time (in minutes): 60   Total Billable Time (in minutes): 60    FOTO:  Intake Score:  %  Survey Score 2:  %  Survey Score 3:  %    Precautions:       Subjective   "I'm feeling a little better today". Agreeable to PT session..  Pain reported as 3/10. L knee 0/10,   R knee 3/10    Objective            Treatment:  Balance/Neuromuscular Re-Education  NMR 1: quad sets: 10x10'' holds, towel under knees  NMR 2: SLR: 2x20 2# B  NMR 3: SL hip abd: 2x15 2# B  NMR 4: SL clams: 2x15x3'' holds, red TB  NMR 5: LAQ: 3x15 greenTB  NMR 6: Cybex LAQ 30 pl 3x10 B  Therapeutic Activity  TA 1: Bridges: 2x15 DL  TA 2: Leg press: 2x20 DL 8 plates, 3x10 SL ea 4 plates  TA 3: lateral step downs: 2x12 B, 4 inch step  TA 5: stationary cycle x 7 min level 2    Time Entry(in minutes):  Neuromuscular Re-Education Time Entry: 30  Therapeutic Activity Time Entry: 30    Assessment & Plan   Assessment: Completed today's session focusing on B LE strengthening / AROM. Performed weight resisted activities within tolerance. Reported clicking and pain in L knee " "performing lateral step downs (4" step) today, only able to complete 10 reps. No adverse reactions to remaining activities today.        The patient will continue to benefit from skilled outpatient physical therapy in order to address the deficits listed in the problem list on the initial evaluation, provide patient and family education, and maximize the patients level of independence in the home and community environments.     The patient's spiritual, cultural, and educational needs were considered, and the patient is agreeable to the plan of care and goals.           Plan: cont to progress PT, advance LE strengthening as tolerated.    Goals:   Active       Changing body position       Patient will demonstrate moving sit to stand 20x without knee pain.       Start:  05/14/25    Expected End:  07/11/25               Functional outcome       Patient will show a significant change in FOTO patient-reported outcome tool to demonstrate subjective improvement       Start:  05/14/25    Expected End:  07/11/25            Patient stated goal: TO get back to walking without knee pain        Start:  05/14/25    Expected End:  07/11/25            Patient will demonstrate independence in home program for support of progression       Start:  05/14/25    Expected End:  07/11/25               Pain       Patient will report pain of 1/10 demonstrating a reduction of overall pain at rest and for ADLs.       Start:  05/14/25    Expected End:  07/11/25            Patient will report a 2 point reduction in pain while performing stairs and sit to stand after prolonged sitting.       Start:  05/14/25    Expected End:  07/11/25               Strength       Patient will achieve bilateral hip strength of 5/5       Start:  05/14/25    Expected End:  07/11/25            Patient will achieve bilateral knee strength of 5/5       Start:  05/14/25    Expected End:  07/11/25                Raffaele Suarez PTA    "

## 2025-06-18 ENCOUNTER — CLINICAL SUPPORT (OUTPATIENT)
Dept: REHABILITATION | Facility: HOSPITAL | Age: 65
End: 2025-06-18
Payer: MEDICARE

## 2025-06-18 DIAGNOSIS — M25.561 ACUTE PAIN OF BOTH KNEES: ICD-10-CM

## 2025-06-18 DIAGNOSIS — M62.81 MUSCLE WEAKNESS: Primary | ICD-10-CM

## 2025-06-18 DIAGNOSIS — M25.562 ACUTE PAIN OF BOTH KNEES: ICD-10-CM

## 2025-06-18 PROCEDURE — 97530 THERAPEUTIC ACTIVITIES: CPT | Mod: PN

## 2025-06-23 ENCOUNTER — CLINICAL SUPPORT (OUTPATIENT)
Dept: REHABILITATION | Facility: HOSPITAL | Age: 65
End: 2025-06-23
Payer: MEDICARE

## 2025-06-23 DIAGNOSIS — M62.81 MUSCLE WEAKNESS: Primary | ICD-10-CM

## 2025-06-23 PROCEDURE — 97112 NEUROMUSCULAR REEDUCATION: CPT | Mod: PN

## 2025-06-23 PROCEDURE — 97530 THERAPEUTIC ACTIVITIES: CPT | Mod: PN

## 2025-06-25 ENCOUNTER — CLINICAL SUPPORT (OUTPATIENT)
Dept: REHABILITATION | Facility: HOSPITAL | Age: 65
End: 2025-06-25
Payer: MEDICARE

## 2025-06-25 DIAGNOSIS — M25.562 ACUTE PAIN OF BOTH KNEES: ICD-10-CM

## 2025-06-25 DIAGNOSIS — M25.561 ACUTE PAIN OF BOTH KNEES: ICD-10-CM

## 2025-06-25 DIAGNOSIS — M62.81 MUSCLE WEAKNESS: Primary | ICD-10-CM

## 2025-06-25 PROCEDURE — 97530 THERAPEUTIC ACTIVITIES: CPT | Mod: PN,CQ

## 2025-06-25 PROCEDURE — 97112 NEUROMUSCULAR REEDUCATION: CPT | Mod: PN,CQ

## 2025-06-25 NOTE — PROGRESS NOTES
Outpatient Rehab    Physical Therapy Visit    Patient Name: Mari Ambrocio  MRN: [id number]  YOB: 1960  Encounter Date: 6/25/2025    Therapy Diagnosis:   Encounter Diagnoses   Name Primary?    Muscle weakness Yes    Acute pain of both knees      Physician: Carlos Moody DO    Physician Orders: Eval and Treat  Medical Diagnosis: Chronic pain of both knees  Patellofemoral arthralgia of right knee  Tear of left meniscus as current injury, sequela  Surgical Diagnosis: Not applicable for this Episode   Surgical Date: Not applicable for this Episode  Days Since Last Surgery: Not applicable for this Episode    Visit # / Visits Authorized:  12 / 20  Insurance Authorization Period: 5/14/2025 to 12/31/2025  Date of Evaluation: 5/14/2025  Plan of Care Certification: 5/14/2025 to 7/11/2025      PT/PTA: PTA   Number of PTA visits since last PT visit:1  Time In: 0900   Time Out: 0955  Total Time (in minutes): 55   Total Billable Time (in minutes): 55    FOTO:  Intake Score:  %  Survey Score 2:  %  Survey Score 3:  %    Precautions:       Subjective   pt reports feeling ok.         Objective            Treatment:  Balance/Neuromuscular Re-Education  NMR 1: SL clams: 2x15x3'' holds, green TB  NMR 2: SLR: 3x15 3# B  NMR 3: LAQ: 3x15 greenTB  NMR 4: l  NMR 5: LAQ: 3x15 greenTB  NMR 6: Cybex LAQ 30 pl 3x10 B  Therapeutic Activity  TA 1: Leg press: 2x20 DL 7-8 plates  TA 2: lateral step downs: 2x12 B, 4 inch step  TA 3: stationary cycle x 6 min level Sprints 2.8    Time Entry(in minutes):  Neuromuscular Re-Education Time Entry: 25  Therapeutic Activity Time Entry: 30    Assessment & Plan   Assessment: Pt completed today's session focusing on B LE strengthening/ ROM. Progressing well with standing/ resistive activities today.   Evaluation/Treatment Tolerance: Patient tolerated treatment well    The patient will continue to benefit from skilled outpatient physical therapy in order to address the deficits listed in the  problem list on the initial evaluation, provide patient and family education, and maximize the patients level of independence in the home and community environments.     The patient's spiritual, cultural, and educational needs were considered, and the patient is agreeable to the plan of care and goals.           Plan: cont to progress PT, advance LE strengthening as tolerated.    Goals:   Active       Changing body position       Patient will demonstrate moving sit to stand 20x without knee pain.       Start:  05/14/25    Expected End:  07/11/25               Functional outcome       Patient will show a significant change in FOTO patient-reported outcome tool to demonstrate subjective improvement       Start:  05/14/25    Expected End:  07/11/25            Patient stated goal: TO get back to walking without knee pain        Start:  05/14/25    Expected End:  07/11/25            Patient will demonstrate independence in home program for support of progression       Start:  05/14/25    Expected End:  07/11/25               Pain       Patient will report pain of 1/10 demonstrating a reduction of overall pain at rest and for ADLs.       Start:  05/14/25    Expected End:  07/11/25            Patient will report a 2 point reduction in pain while performing stairs and sit to stand after prolonged sitting.       Start:  05/14/25    Expected End:  07/11/25               Strength       Patient will achieve bilateral hip strength of 5/5       Start:  05/14/25    Expected End:  07/11/25            Patient will achieve bilateral knee strength of 5/5       Start:  05/14/25    Expected End:  07/11/25                Raffaele Suarez PTA

## 2025-06-25 NOTE — PROGRESS NOTES
Outpatient Rehab    Physical Therapy Visit    Patient Name: Mari Ambrocio  MRN: [id number]  YOB: 1960  Encounter Date: 6/23/2025    Therapy Diagnosis:   Encounter Diagnosis   Name Primary?    Muscle weakness Yes     Physician: Carlos Moody DO    Physician Orders: Eval and Treat  Medical Diagnosis: Chronic pain of both knees  Patellofemoral arthralgia of right knee  Tear of left meniscus as current injury, sequela  Surgical Diagnosis: Not applicable for this Episode   Surgical Date: Not applicable for this Episode  Days Since Last Surgery: Not applicable for this Episode    Visit # / Visits Authorized:  12 / 20  Insurance Authorization Period: 5/14/2025 to 12/31/2025  Date of Evaluation: 5/14/2025  Plan of Care Certification: 5/14/2025 to 7/11/2025      PT/PTA: PT   Number of PTA visits since last PT visit:0  Time In: 0905   Time Out: 1000  Total Time (in minutes): 55   Total Billable Time (in minutes): 55    FOTO:  Intake Score:  %  Survey Score 2:  %  Survey Score 3:  %    Precautions:       Subjective   Knee a little sore more so right knee than left, much better pain overall..  Pain reported as 3/10. L knee 0/10,   R knee 5/10    Objective            Treatment:  Balance/Neuromuscular Re-Education  NMR 1: SL clams: 2x15x3'' holds, green TB  NMR 2: SLR: 3x15 3# B  NMR 3: LAQ: 3x15 greenTB  NMR 4: Cybex LAQ 35 pl 3x10 B  Therapeutic Activity  TA 1: Leg press: 2x20 DL 7-8 plates  TA 2: lateral step downs: 2x12 B, 4 inch step  TA 3: stationary cycle x 6 min level 4    Time Entry(in minutes):  Neuromuscular Re-Education Time Entry: 25  Therapeutic Activity Time Entry: 30    Assessment & Plan   Assessment: Did well with progression to single leg WB activities, slight medial knee pain with each on standing activities, and needs heavier non-painful quad strength. She is keeping her step count lower overall now and taking more breaks from being on her feet. She is typically very active.        The patient  will continue to benefit from skilled outpatient physical therapy in order to address the deficits listed in the problem list on the initial evaluation, provide patient and family education, and maximize the patients level of independence in the home and community environments.     The patient's spiritual, cultural, and educational needs were considered, and the patient is agreeable to the plan of care and goals.           Plan:      Goals:   Active       Changing body position       Patient will demonstrate moving sit to stand 20x without knee pain.       Start:  05/14/25    Expected End:  07/11/25               Functional outcome       Patient will show a significant change in FOTO patient-reported outcome tool to demonstrate subjective improvement       Start:  05/14/25    Expected End:  07/11/25            Patient stated goal: TO get back to walking without knee pain        Start:  05/14/25    Expected End:  07/11/25            Patient will demonstrate independence in home program for support of progression       Start:  05/14/25    Expected End:  07/11/25               Pain       Patient will report pain of 1/10 demonstrating a reduction of overall pain at rest and for ADLs.       Start:  05/14/25    Expected End:  07/11/25            Patient will report a 2 point reduction in pain while performing stairs and sit to stand after prolonged sitting.       Start:  05/14/25    Expected End:  07/11/25               Strength       Patient will achieve bilateral hip strength of 5/5       Start:  05/14/25    Expected End:  07/11/25            Patient will achieve bilateral knee strength of 5/5       Start:  05/14/25    Expected End:  07/11/25                Guicho Lopez, PT, DPT

## 2025-06-30 ENCOUNTER — CLINICAL SUPPORT (OUTPATIENT)
Dept: REHABILITATION | Facility: HOSPITAL | Age: 65
End: 2025-06-30
Payer: MEDICARE

## 2025-06-30 DIAGNOSIS — M25.562 ACUTE PAIN OF BOTH KNEES: ICD-10-CM

## 2025-06-30 DIAGNOSIS — M62.81 MUSCLE WEAKNESS: Primary | ICD-10-CM

## 2025-06-30 DIAGNOSIS — M25.561 ACUTE PAIN OF BOTH KNEES: ICD-10-CM

## 2025-06-30 PROCEDURE — 97112 NEUROMUSCULAR REEDUCATION: CPT | Mod: PN,CQ

## 2025-06-30 PROCEDURE — 97530 THERAPEUTIC ACTIVITIES: CPT | Mod: PN,CQ

## 2025-06-30 NOTE — PROGRESS NOTES
"    Outpatient Rehab    Physical Therapy Visit    Patient Name: Mari Ambrocio  MRN: [id number]  YOB: 1960  Encounter Date: 6/30/2025    Therapy Diagnosis:   Encounter Diagnoses   Name Primary?    Muscle weakness Yes    Acute pain of both knees      Physician: Carlos Moody DO    Physician Orders: Eval and Treat  Medical Diagnosis: Chronic pain of both knees  Patellofemoral arthralgia of right knee  Tear of left meniscus as current injury, sequela  Surgical Diagnosis: Not applicable for this Episode   Surgical Date: Not applicable for this Episode  Days Since Last Surgery: Not applicable for this Episode    Visit # / Visits Authorized:  13 / 20  Insurance Authorization Period: 5/14/2025 to 12/31/2025  Date of Evaluation: 5/14/2025  Plan of Care Certification: 5/14/2025 to 7/11/2025      PT/PTA: PTA   Number of PTA visits since last PT visit:1  Time In: 0900   Time Out: 1000  Total Time (in minutes): 60   Total Billable Time (in minutes): 38    FOTO:  Intake Score:  %  Survey Score 2:  %  Survey Score 3:  %    Precautions:       Subjective   "It's getting better". decreased R knee pain reported today.    L knee 0/10,   R knee 2/10    Objective            Treatment:  Balance/Neuromuscular Re-Education  NMR 1: SL clams: 2x15x3'' holds, green TB  NMR 2: SLR: 3x15 3# B  NMR 3: LAQ: 3x15 greenTB  NMR 5: LAQ: 3x15 greenTB  NMR 6: Cybex LAQ 30 pl 3x10 B  Therapeutic Activity  TA 1: Leg press: 2x20 DL 7-8 plates  TA 2: lateral step downs: 2x12 B, 4 inch step  TA 3: stationary cycle x 6 min level Sprints 2.8  TA 4: LAQ: 10x2 right red TB  TA 5: stationary cycle x 7 min level 2 <NOT PERFORMED    Time Entry(in minutes):  Neuromuscular Re-Education Time Entry: 20  Therapeutic Activity Time Entry: 18    Assessment & Plan   Assessment: Mari arrived to session with no new reports of B knee pain. She responded well to weight resisted activities and standing, required short rest breaks to recover from general fatigue. " Does exhibit difficulty with step down exercises, mainly knee pain reported.        The patient will continue to benefit from skilled outpatient physical therapy in order to address the deficits listed in the problem list on the initial evaluation, provide patient and family education, and maximize the patients level of independence in the home and community environments.     The patient's spiritual, cultural, and educational needs were considered, and the patient is agreeable to the plan of care and goals.           Plan: cont to progress PT, advance LE strengthening as tolerated.    Goals:   Active       Changing body position       Patient will demonstrate moving sit to stand 20x without knee pain.       Start:  05/14/25    Expected End:  07/11/25               Functional outcome       Patient will show a significant change in FOTO patient-reported outcome tool to demonstrate subjective improvement       Start:  05/14/25    Expected End:  07/11/25            Patient stated goal: TO get back to walking without knee pain        Start:  05/14/25    Expected End:  07/11/25            Patient will demonstrate independence in home program for support of progression       Start:  05/14/25    Expected End:  07/11/25               Pain       Patient will report pain of 1/10 demonstrating a reduction of overall pain at rest and for ADLs.       Start:  05/14/25    Expected End:  07/11/25            Patient will report a 2 point reduction in pain while performing stairs and sit to stand after prolonged sitting.       Start:  05/14/25    Expected End:  07/11/25               Strength       Patient will achieve bilateral hip strength of 5/5       Start:  05/14/25    Expected End:  07/11/25            Patient will achieve bilateral knee strength of 5/5       Start:  05/14/25    Expected End:  07/11/25                Raffaele Suarez PTA

## 2025-07-01 NOTE — PROGRESS NOTES
Outpatient Rehab    Physical Therapy Visit    Patient Name: Mari Ambrocio  MRN: [id number]  YOB: 1960  Encounter Date: 6/18/2025    Therapy Diagnosis:   Encounter Diagnoses   Name Primary?    Muscle weakness Yes    Acute pain of both knees      Physician: Carlos Moody DO    Physician Orders: Eval and Treat  Medical Diagnosis: Chronic pain of both knees  Patellofemoral arthralgia of right knee  Tear of left meniscus as current injury, sequela  Surgical Diagnosis: Not applicable for this Episode   Surgical Date: Not applicable for this Episode  Days Since Last Surgery: Not applicable for this Episode    Visit # / Visits Authorized:  13 / 20  Insurance Authorization Period: 5/14/2025 to 12/31/2025  Date of Evaluation: 5/14/2025  Plan of Care Certification: 5/14/2025 to 7/11/2025      PT/PTA: PT   Number of PTA visits since last PT visit:0  Time In: 0905   Time Out: 1000  Total Time (in minutes): 55   Total Billable Time (in minutes): 25    FOTO:  Intake Score:  %  Survey Score 2:  %  Survey Score 3:  %    Precautions:       Subjective   Patient reports having less knee pain overall though right knee still is more bothersome than the other..  Pain reported as 2/10. L knee 0/10,   R knee 2/10    Objective            Treatment:  Balance/Neuromuscular Re-Education  NMR 1: quad sets: 10x10'' holds, towel under knees  NMR 2: SLR: 2x20 2# B  NMR 3: SL hip abd: 2x15 2# B  NMR 4: SL clams: 2x15x3'' holds, red TB  NMR 5: LAQ: 3x15 greenTB  NMR 6: Cybex LAQ 30 pl 3x10 B  Therapeutic Activity  TA 1: Bridges: 2x15 DL  TA 2: Leg press: 2x20 DL 8 plates, 3x10 SL ea 4 plates  TA 3: lateral step downs: 2x12 B, 4 inch step  TA 5: stationary cycle x 7 min level 2    Time Entry(in minutes):  Neuromuscular Re-Education Time Entry: 30  Therapeutic Activity Time Entry: 25    Assessment & Plan   Assessment: Patient progressing well, still has some pain with weight-bearing single leg quad dominant activities.  She will  benefit from further open chain quad strengthening, she does complain of some of what appears to be patellofemoral pain.       The patient will continue to benefit from skilled outpatient physical therapy in order to address the deficits listed in the problem list on the initial evaluation, provide patient and family education, and maximize the patients level of independence in the home and community environments.     The patient's spiritual, cultural, and educational needs were considered, and the patient is agreeable to the plan of care and goals.           Plan:      Goals:   Active       Changing body position       Patient will demonstrate moving sit to stand 20x without knee pain.       Start:  05/14/25    Expected End:  07/11/25               Functional outcome       Patient will show a significant change in FOTO patient-reported outcome tool to demonstrate subjective improvement       Start:  05/14/25    Expected End:  07/11/25            Patient stated goal: TO get back to walking without knee pain        Start:  05/14/25    Expected End:  07/11/25            Patient will demonstrate independence in home program for support of progression       Start:  05/14/25    Expected End:  07/11/25               Pain       Patient will report pain of 1/10 demonstrating a reduction of overall pain at rest and for ADLs.       Start:  05/14/25    Expected End:  07/11/25            Patient will report a 2 point reduction in pain while performing stairs and sit to stand after prolonged sitting.       Start:  05/14/25    Expected End:  07/11/25               Strength       Patient will achieve bilateral hip strength of 5/5       Start:  05/14/25    Expected End:  07/11/25            Patient will achieve bilateral knee strength of 5/5       Start:  05/14/25    Expected End:  07/11/25                Guicho Lopez, PT, DPT

## 2025-07-02 ENCOUNTER — CLINICAL SUPPORT (OUTPATIENT)
Dept: REHABILITATION | Facility: HOSPITAL | Age: 65
End: 2025-07-02
Payer: MEDICARE

## 2025-07-02 DIAGNOSIS — M62.81 MUSCLE WEAKNESS: Primary | ICD-10-CM

## 2025-07-02 DIAGNOSIS — M25.561 ACUTE PAIN OF BOTH KNEES: ICD-10-CM

## 2025-07-02 DIAGNOSIS — M25.562 ACUTE PAIN OF BOTH KNEES: ICD-10-CM

## 2025-07-02 PROCEDURE — 97112 NEUROMUSCULAR REEDUCATION: CPT | Mod: PN

## 2025-07-02 PROCEDURE — 97530 THERAPEUTIC ACTIVITIES: CPT | Mod: PN

## 2025-07-02 NOTE — PROGRESS NOTES
Outpatient Rehab    Physical Therapy Visit    Patient Name: Mari Ambrocio  MRN: [id number]  YOB: 1960  Encounter Date: 7/2/2025    Therapy Diagnosis:   Encounter Diagnoses   Name Primary?    Muscle weakness Yes    Acute pain of both knees      Physician: Carlos Moody DO    Physician Orders: Eval and Treat  Medical Diagnosis: Chronic pain of both knees  Patellofemoral arthralgia of right knee  Tear of left meniscus as current injury, sequela  Surgical Diagnosis: Not applicable for this Episode   Surgical Date: Not applicable for this Episode  Days Since Last Surgery: Not applicable for this Episode    Visit # / Visits Authorized:  14 / 20  Insurance Authorization Period: 5/14/2025 to 12/31/2025  Date of Evaluation: 5/14/2025  Plan of Care Certification: 5/14/2025 to 7/11/2025      PT/PTA: PT   Number of PTA visits since last PT visit:0  Time In: 1005   Time Out: 1100  Total Time (in minutes): 55   Total Billable Time (in minutes): 30    FOTO:  Intake Score:  %  Survey Score 2:  %  Survey Score 3:  %    Precautions:       Subjective   minimal to no left knee pain and slight right knee pain present..    L knee 0.5/10,   R knee 1.5-2/10    Objective            Treatment:  Therapeutic Exercise  TE 1: stationary cycle x 6 min level Sprints 2.8  TE 2: Heel raises from ledge: 2x20 DL  Balance/Neuromuscular Re-Education  NMR 1: SL hip abduction: 2x15 B 3#  NMR 2: SLR: 2x15 4# B  NMR 3: Cybex LAQ 40 pl 3x10 B  Therapeutic Activity  TA 1: Leg press: 2x15 DL 7 plates, sled=5  TA 2: lateral step downs: 3x10 B, 6 inch step <NOT PERFORMED    Time Entry(in minutes):  Neuromuscular Re-Education Time Entry: 25  Therapeutic Activity Time Entry: 15  Therapeutic Exercise Time Entry: 15    Assessment & Plan   Assessment: Patient doing very well, nearing discharge ideally next week.  Mild crepitus throughout session, nonpainful, did not limit her in any exercise.  Able to advance resistance in reps in all activities  today, and would benefit from microFET that testing next week.  He does have a slight Trendelenburg gait pattern and will need further lateral hip strengthening in the future included on home exercise program.       The patient will continue to benefit from skilled outpatient physical therapy in order to address the deficits listed in the problem list on the initial evaluation, provide patient and family education, and maximize the patients level of independence in the home and community environments.     The patient's spiritual, cultural, and educational needs were considered, and the patient is agreeable to the plan of care and goals.           Plan:      Goals:   Active       Changing body position       Patient will demonstrate moving sit to stand 20x without knee pain.       Start:  05/14/25    Expected End:  07/11/25               Functional outcome       Patient will show a significant change in FOTO patient-reported outcome tool to demonstrate subjective improvement       Start:  05/14/25    Expected End:  07/11/25            Patient stated goal: TO get back to walking without knee pain        Start:  05/14/25    Expected End:  07/11/25            Patient will demonstrate independence in home program for support of progression       Start:  05/14/25    Expected End:  07/11/25               Pain       Patient will report pain of 1/10 demonstrating a reduction of overall pain at rest and for ADLs.       Start:  05/14/25    Expected End:  07/11/25            Patient will report a 2 point reduction in pain while performing stairs and sit to stand after prolonged sitting.       Start:  05/14/25    Expected End:  07/11/25               Strength       Patient will achieve bilateral hip strength of 5/5       Start:  05/14/25    Expected End:  07/11/25            Patient will achieve bilateral knee strength of 5/5       Start:  05/14/25    Expected End:  07/11/25                Guicho Lopez, PT, DPT

## 2025-07-07 ENCOUNTER — CLINICAL SUPPORT (OUTPATIENT)
Dept: REHABILITATION | Facility: HOSPITAL | Age: 65
End: 2025-07-07
Payer: MEDICARE

## 2025-07-07 DIAGNOSIS — M62.81 MUSCLE WEAKNESS: Primary | ICD-10-CM

## 2025-07-07 DIAGNOSIS — M25.561 ACUTE PAIN OF BOTH KNEES: ICD-10-CM

## 2025-07-07 DIAGNOSIS — M25.562 ACUTE PAIN OF BOTH KNEES: ICD-10-CM

## 2025-07-07 PROCEDURE — 97140 MANUAL THERAPY 1/> REGIONS: CPT | Mod: PN

## 2025-07-07 PROCEDURE — 97112 NEUROMUSCULAR REEDUCATION: CPT | Mod: PN

## 2025-07-07 PROCEDURE — 97530 THERAPEUTIC ACTIVITIES: CPT | Mod: PN

## 2025-07-07 NOTE — PROGRESS NOTES
Outpatient Rehab    Physical Therapy Visit    Patient Name: Mari Ambrocio  MRN: [id number]  YOB: 1960  Encounter Date: 7/7/2025    Therapy Diagnosis:   Encounter Diagnoses   Name Primary?    Muscle weakness Yes    Acute pain of both knees      Physician: Carlos Moody DO    Physician Orders: Eval and Treat  Medical Diagnosis: Chronic pain of both knees  Patellofemoral arthralgia of right knee  Tear of left meniscus as current injury, sequela  Surgical Diagnosis: Not applicable for this Episode   Surgical Date: Not applicable for this Episode  Days Since Last Surgery: Not applicable for this Episode    Visit # / Visits Authorized:  15 / 20  Insurance Authorization Period: 5/14/2025 to 12/31/2025  Date of Evaluation: 5/14/2025  Plan of Care Certification: 5/14/2025 to 7/11/2025      PT/PTA: PT   Number of PTA visits since last PT visit:0  Time In:     Time Out:    Total Time (in minutes):     Total Billable Time (in minutes):      FOTO:  Intake Score:  %  Survey Score 2:  %  Survey Score 3:  %    Precautions:       Subjective   Left knee feels great, right knee slightly sore but both much better than they were..  Pain reported as 1/10. L knee 0.5/10,   R knee 1.5-2/10    Objective            Treatment:  Therapeutic Exercise  TE 1: stationary cycle x 6 min level Sprints 2.8  TE 2: Heel raises from ledge: 2x20 DL  Manual Therapy  MT 1: superior patellat mobs, rgade II-III  MT 2: suprapatellar bursa medial/lateral mobs, grade I-III  MT 3: knee extension mobs, grade II-III  Balance/Neuromuscular Re-Education  NMR 1: SL hip abduction: 2x15 B 3#  NMR 2: SLR: 2x15 4# B  NMR 3: Cybex LAQ 40 pl 3x10 B  NMR 4: TKE: 30x5'' holds, red power tube band, right only  Therapeutic Activity  TA 1: Leg press: 2x15 DL 7 plates, sled=5  TA 3: Air squats: 10x     Time Entry(in minutes):  Manual Therapy Time Entry: 15  Neuromuscular Re-Education Time Entry: 20  Therapeutic Activity Time Entry: 10  Therapeutic Exercise Time  Entry: 10    Assessment & Plan   Assessment: Lacking right knee hyperextension, improved after manual, would benefit from more functional strengthening into terminal knee extension, and quad strength along with pain much improved. Pt progressing well and likely will update for another 2-3 weeks for said impairments to further decrease Bilateral knee pain.        The patient will continue to benefit from skilled outpatient physical therapy in order to address the deficits listed in the problem list on the initial evaluation, provide patient and family education, and maximize the patients level of independence in the home and community environments.     The patient's spiritual, cultural, and educational needs were considered, and the patient is agreeable to the plan of care and goals.           Plan:      Goals:   Active       Changing body position       Patient will demonstrate moving sit to stand 20x without knee pain. (Progressing)       Start:  05/14/25    Expected End:  07/11/25               Functional outcome       Patient will show a significant change in FOTO patient-reported outcome tool to demonstrate subjective improvement (Progressing)       Start:  05/14/25    Expected End:  07/11/25            Patient stated goal: TO get back to walking without knee pain  (Progressing)       Start:  05/14/25    Expected End:  07/11/25            Patient will demonstrate independence in home program for support of progression (Progressing)       Start:  05/14/25    Expected End:  07/11/25               Pain       Patient will report pain of 1/10 demonstrating a reduction of overall pain at rest and for ADLs. (Progressing)       Start:  05/14/25    Expected End:  07/11/25            Patient will report a 2 point reduction in pain while performing stairs and sit to stand after prolonged sitting. (Progressing)       Start:  05/14/25    Expected End:  07/11/25               Strength       Patient will achieve bilateral hip  strength of 5/5 (Progressing)       Start:  05/14/25    Expected End:  07/11/25            Patient will achieve bilateral knee strength of 5/5 (Progressing)       Start:  05/14/25    Expected End:  07/11/25                Guicho Lopez, PT, DPT

## 2025-07-09 ENCOUNTER — CLINICAL SUPPORT (OUTPATIENT)
Dept: REHABILITATION | Facility: HOSPITAL | Age: 65
End: 2025-07-09
Payer: MEDICARE

## 2025-07-09 DIAGNOSIS — M62.81 MUSCLE WEAKNESS: Primary | ICD-10-CM

## 2025-07-09 PROCEDURE — 97112 NEUROMUSCULAR REEDUCATION: CPT | Mod: PN

## 2025-07-09 PROCEDURE — 97530 THERAPEUTIC ACTIVITIES: CPT | Mod: PN

## 2025-07-09 PROCEDURE — 97110 THERAPEUTIC EXERCISES: CPT | Mod: PN

## 2025-07-30 NOTE — PROGRESS NOTES
Outpatient Rehab    Physical Therapy Discharge    Patient Name: Mari Ambrocio  MRN: [id number]  YOB: 1960  Encounter Date: 7/9/2025    Therapy Diagnosis:   Encounter Diagnosis   Name Primary?    Muscle weakness Yes     Physician: Carlos Moody DO    Physician Orders: Eval and Treat  Medical Diagnosis: Chronic pain of both knees  Patellofemoral arthralgia of right knee  Tear of left meniscus as current injury, sequela  Surgical Diagnosis: Not applicable for this Episode   Surgical Date: Not applicable for this Episode  Days Since Last Surgery: Not applicable for this Episode    Visit # / Visits Authorized:  16 / 20  Insurance Authorization Period: 5/14/2025 to 12/31/2025  Date of Evaluation: 5/14/2025  Plan of Care Certification: 5/14/2025 to 7/11/2025      PT/PTA: PT   Number of PTA visits since last PT visit:0  Time In: 0905   Time Out: 1000  Total Time (in minutes): 55   Total Billable Time (in minutes): 30    FOTO:  Intake Score (%): 34  Survey Score 2 (%): Not applicable for this Episode  Survey Score 3 (%): Not applicable for this Episode    Precautions:       Subjective   Knees feel much better overall, very slight right knee pain at times but very low-grade and very satisfied with progress overall.  Ready for to be last day..  Pain reported as 1/10. L knee 0.5/10,   R knee 1.5/10    Objective       Knee Range of Motion   Right Knee   Active (deg) Passive (deg) Pain   Flexion 130 131     Extension 0 0         Left Knee   Active (deg) Passive (deg) Pain   Flexion 130 131     Extension 0 0                        Hip Strength - Planes of Motion   Right Strength Right Pain Left Strength Left  Pain   Flexion (L2) 5   5     Extension 5   5     ABduction 4+   5     ADduction 5   5     Internal Rotation 5   5     External Rotation 5   5         Knee Strength   Right Strength Right Pain Left Strength Left  Pain   Flexion (S2) 5   5     Prone Flexion           Extension (L3) 4+   5            Ankle/Foot  Strength - Planes of Motion   Right Strength Right Pain Left Strength Left  Pain   Dorsiflexion (L4) 5   5     Plantar Flexion (S1) 5   5     Inversion           Eversion           Great Toe Flexion           Great Toe Extension (L5)           Lesser Toes Flexion           Lesser Toes Extension                  Knee Special Tests  Knee Ligament Tests  Negative: Right Anterior Drawer, Left Anterior Drawer, Right Posterior Drawer, and Left Posterior Drawer  Negative: Right Valgus Stress at 0 Degrees, Left Valgus Stress at 0 Degrees, Right Varus Stress at 0 Degrees, Left Varus Stress at 0 Degrees, Right Valgus Stress at 30 Degrees, Left Valgus Stress at 30 Degrees, Left Varus Stress at 30 Degrees, and Right Varus Stress at 30 Degrees       Knee Meniscal Tests  Negative: Right Lateral Yassine, Left Lateral Yassine, Right Medial Yassine, and Left Medial Yassine                  Knee Patellar Screening       Right Patellar Mobility  Normal: Superior  Left Patellar Mobility  Normal: Superior              Ambulation Assistance Required  Surface With  Assistive Device Without Assistive Device Details   Level   Independent      Uneven   Independent     Curb           Gait Analysis  Base of Support: Normal                   Treatment:  Therapeutic Exercise  TE 1: stationary cycle x 6 min level Sprints 2.8  TE 2: Heel raises from ledge: 2x20 DL  Balance/Neuromuscular Re-Education  NMR 1: SL hip abduction: 2x15 B 3#  NMR 2: SLR: 2x15 4# B  NMR 3: Cybex LAQ 40 pl 3x10 B  NMR 4: TKE: 30x5'' holds  Therapeutic Activity  TA 1: Leg press: 2x15 DL 7 plates, sled=5  TA 2: FOTO and reassessment  TA 3: reassessment and FOTO    Time Entry(in minutes):  Neuromuscular Re-Education Time Entry: 15  Therapeutic Activity Time Entry: 25  Therapeutic Exercise Time Entry: 10    Assessment & Plan   Assessment: Patient met all short and long-term goals, we will continue HEP working on quad strength, she was advised to still be careful with  prolong and high impact activities and has done very well overall.  Patient discharged from physical therapy and she will let us know if she has any questions in the future.       The patient's spiritual, cultural, and educational needs were considered, and the patient is agreeable to the plan of care and goals.           Plan: Patient discharged from PT.    Goals:   Active       Strength       Patient will achieve bilateral hip strength of 5/5 (Met)       Start:  05/14/25    Expected End:  07/11/25    Resolved:  07/30/25         Patient will achieve bilateral knee strength of 5/5 (Met)       Start:  05/14/25    Expected End:  07/11/25    Resolved:  07/30/25           Resolved       Changing body position       Patient will demonstrate moving sit to stand 20x without knee pain. (Met)       Start:  05/14/25    Expected End:  07/11/25    Resolved:  07/30/25            Functional outcome       Patient will show a significant change in FOTO patient-reported outcome tool to demonstrate subjective improvement (Met)       Start:  05/14/25    Expected End:  07/11/25    Resolved:  07/30/25         Patient stated goal: TO get back to walking without knee pain  (Met)       Start:  05/14/25    Expected End:  07/11/25    Resolved:  07/30/25         Patient will demonstrate independence in home program for support of progression (Met)       Start:  05/14/25    Expected End:  07/11/25    Resolved:  07/30/25            Pain       Patient will report pain of 1/10 demonstrating a reduction of overall pain at rest and for ADLs. (Met)       Start:  05/14/25    Expected End:  07/11/25    Resolved:  07/30/25         Patient will report a 2 point reduction in pain while performing stairs and sit to stand after prolonged sitting. (Met)       Start:  05/14/25    Expected End:  07/11/25    Resolved:  07/30/25             Guicho Lopez, PT, DPT

## 2025-09-02 ENCOUNTER — OFFICE VISIT (OUTPATIENT)
Dept: FAMILY MEDICINE | Facility: CLINIC | Age: 65
End: 2025-09-02
Payer: MEDICARE

## 2025-09-02 VITALS
HEART RATE: 78 BPM | SYSTOLIC BLOOD PRESSURE: 118 MMHG | HEIGHT: 65 IN | BODY MASS INDEX: 31.63 KG/M2 | DIASTOLIC BLOOD PRESSURE: 74 MMHG | WEIGHT: 189.81 LBS | OXYGEN SATURATION: 97 %

## 2025-09-02 DIAGNOSIS — Z23 FLU VACCINE NEED: ICD-10-CM

## 2025-09-02 DIAGNOSIS — R53.83 FATIGUE, UNSPECIFIED TYPE: ICD-10-CM

## 2025-09-02 DIAGNOSIS — M25.561 CHRONIC PAIN OF BOTH KNEES: Primary | ICD-10-CM

## 2025-09-02 DIAGNOSIS — M25.562 CHRONIC PAIN OF BOTH KNEES: Primary | ICD-10-CM

## 2025-09-02 DIAGNOSIS — E66.811 CLASS 1 OBESITY WITHOUT SERIOUS COMORBIDITY WITH BODY MASS INDEX (BMI) OF 31.0 TO 31.9 IN ADULT, UNSPECIFIED OBESITY TYPE: ICD-10-CM

## 2025-09-02 DIAGNOSIS — G89.29 CHRONIC PAIN OF BOTH KNEES: Primary | ICD-10-CM

## 2025-09-02 DIAGNOSIS — E66.9 OBESITY (BMI 30-39.9): ICD-10-CM

## 2025-09-02 DIAGNOSIS — R73.09 ABNORMAL GLUCOSE: ICD-10-CM

## 2025-09-02 PROCEDURE — 99999 PR PBB SHADOW E&M-EST. PATIENT-LVL IV: CPT | Mod: PBBFAC,,,

## 2025-09-03 ENCOUNTER — HOSPITAL ENCOUNTER (OUTPATIENT)
Dept: RADIOLOGY | Facility: HOSPITAL | Age: 65
Discharge: HOME | End: 2025-09-03
Payer: MEDICARE

## 2025-09-03 ENCOUNTER — TELEPHONE (OUTPATIENT)
Dept: FAMILY MEDICINE | Facility: CLINIC | Age: 65
End: 2025-09-03
Payer: MEDICARE

## 2025-09-03 DIAGNOSIS — M25.561 CHRONIC PAIN OF BOTH KNEES: ICD-10-CM

## 2025-09-03 DIAGNOSIS — G89.29 CHRONIC PAIN OF BOTH KNEES: ICD-10-CM

## 2025-09-03 DIAGNOSIS — M17.0 PRIMARY OSTEOARTHRITIS OF BOTH KNEES: Primary | ICD-10-CM

## 2025-09-03 DIAGNOSIS — M25.562 CHRONIC PAIN OF BOTH KNEES: ICD-10-CM

## 2025-09-03 PROCEDURE — 73560 X-RAY EXAM OF KNEE 1 OR 2: CPT | Mod: 26,50,, | Performed by: RADIOLOGY

## 2025-09-03 PROCEDURE — 73560 X-RAY EXAM OF KNEE 1 OR 2: CPT | Mod: TC,50,PO

## (undated) DEVICE — OBTURATOR BLADELESS 8MM XI CLR

## (undated) DEVICE — SUT VICRYL 0 27 CT-2

## (undated) DEVICE — SEAL UNIVERSAL 5MM-8MM XI

## (undated) DEVICE — SEE MEDLINE ITEM 156930

## (undated) DEVICE — KIT URINE METER 350ML STAT LOC

## (undated) DEVICE — POSITIONER HEEL FOAM CONVOLTD

## (undated) DEVICE — SEE MEDLINE ITEM 146347

## (undated) DEVICE — BELLOW CANN HEMOBLAST 1.65GR

## (undated) DEVICE — SYR 10CC LUER LOCK

## (undated) DEVICE — DRAPE COLUMN DAVINCI XI

## (undated) DEVICE — SUT ETHIBOND EXCEL 0 CT2 30

## (undated) DEVICE — INSERT CUSHIONPRONE VIEW LARGE

## (undated) DEVICE — SET CYSTO IRRIGATION UNIV SPIK

## (undated) DEVICE — UNDERGLOVES BIOGEL PI SZ 7 LF

## (undated) DEVICE — SUT MCRYL PLUS 4-0 PS2 27IN

## (undated) DEVICE — DRAPE ARM DAVINCI XI

## (undated) DEVICE — KIT WING PAD POSITIONING

## (undated) DEVICE — IRRIGATOR ENDOSCOPY DISP.

## (undated) DEVICE — PAD PREP 50/CA

## (undated) DEVICE — COVER TIP CURVED SCISSORS XI

## (undated) DEVICE — TIP RUMI BLUE DISPOSABLE 5/BX

## (undated) DEVICE — SOL WATER STRL IRR 1000ML

## (undated) DEVICE — SEE MEDLINE ITEM 152186

## (undated) DEVICE — SOL ELECTROLUBE ANTI-STIC

## (undated) DEVICE — GLOVE BIOGEL SKINSENSE PI 7.0

## (undated) DEVICE — SEE MEDLINE ITEM 156923

## (undated) DEVICE — SOL NS 1000CC

## (undated) DEVICE — CATH FOL IC 3WAY 16F 5CCLF

## (undated) DEVICE — SOL STRL WATER INJ 1000ML BG

## (undated) DEVICE — PORT ACCESS 8MM W/120MM LOW

## (undated) DEVICE — SUT PDS II 2-0 CT-2 VIL

## (undated) DEVICE — GLOVE BIOGEL SKINSENSE PI 6.5

## (undated) DEVICE — ADHESIVE DERMABOND ADVANCED

## (undated) DEVICE — ELECTRODE REM PLYHSV RETURN 9

## (undated) DEVICE — APPLICATOR HEMOBLAST LAPSCP

## (undated) DEVICE — SEE MEDLINE ITEM 152622

## (undated) DEVICE — NDL HYPO REG 25G X 1 1/2

## (undated) DEVICE — SUT VICRYL CTD 2-0 GI 27 SH

## (undated) DEVICE — UNDERGLOVE BIOGEL PI SZ 6.5 LF

## (undated) DEVICE — DRAPE STERI INSTRUMENT 1018

## (undated) DEVICE — TROCAR ENDOPATH XCEL 5X100MM

## (undated) DEVICE — SUT 0 8-27IN VICRYL PL CT-1

## (undated) DEVICE — OCCLUDER COLPO-PNEUMO STERILE

## (undated) DEVICE — SEE MEDLINE ITEM 157110

## (undated) DEVICE — SOL 9P NACL IRR PIC IL

## (undated) DEVICE — JELLY SURGILUBE 5GR

## (undated) DEVICE — DEVICE ANC SW STAT FOLEY 6-24

## (undated) DEVICE — BLADE SURG STAINLESS STEEL #15

## (undated) DEVICE — SET TRI-LUMEN FILTERED TUBE